# Patient Record
Sex: FEMALE | Race: BLACK OR AFRICAN AMERICAN | Employment: OTHER | URBAN - METROPOLITAN AREA
[De-identification: names, ages, dates, MRNs, and addresses within clinical notes are randomized per-mention and may not be internally consistent; named-entity substitution may affect disease eponyms.]

---

## 2017-10-06 PROBLEM — R06.02 SHORTNESS OF BREATH: Status: ACTIVE | Noted: 2017-10-06

## 2017-10-06 PROBLEM — F41.9 ANXIETY: Status: ACTIVE | Noted: 2017-10-06

## 2017-10-06 PROBLEM — H61.21 IMPACTED CERUMEN OF RIGHT EAR: Status: ACTIVE | Noted: 2017-10-06

## 2017-11-22 PROBLEM — R06.02 SHORTNESS OF BREATH: Status: RESOLVED | Noted: 2017-10-06 | Resolved: 2017-11-22

## 2017-11-22 PROBLEM — H61.21 IMPACTED CERUMEN OF RIGHT EAR: Status: RESOLVED | Noted: 2017-10-06 | Resolved: 2017-11-22

## 2017-12-01 PROBLEM — F99 ABNORMAL MMSE: Status: ACTIVE | Noted: 2017-12-01

## 2018-03-30 ENCOUNTER — HOSPITAL ENCOUNTER (OUTPATIENT)
Dept: MAMMOGRAPHY | Age: 78
Discharge: HOME | End: 2018-03-30
Attending: INTERNAL MEDICINE
Payer: MEDICARE

## 2018-03-30 DIAGNOSIS — Z12.31 VISIT FOR SCREENING MAMMOGRAM: ICD-10-CM

## 2018-03-30 PROCEDURE — 77067 SCR MAMMO BI INCL CAD: CPT

## 2018-07-13 PROBLEM — H91.93 BILATERAL HEARING LOSS: Status: ACTIVE | Noted: 2018-07-13

## 2019-03-15 ENCOUNTER — HOSPITAL ENCOUNTER (OUTPATIENT)
Dept: MAMMOGRAPHY | Age: 79
Discharge: HOME | End: 2019-03-15
Attending: INTERNAL MEDICINE
Payer: MEDICARE

## 2019-03-15 DIAGNOSIS — M81.0 AGE-RELATED OSTEOPOROSIS WITHOUT CURRENT PATHOLOGICAL FRACTURE: ICD-10-CM

## 2019-03-15 PROBLEM — F33.1 MODERATE EPISODE OF RECURRENT MAJOR DEPRESSIVE DISORDER (HCC): Status: ACTIVE | Noted: 2019-03-15

## 2019-03-15 PROBLEM — F22 PARANOID IDEATION (HCC): Status: ACTIVE | Noted: 2019-03-15

## 2019-03-15 PROBLEM — R41.0 CONFUSION: Status: ACTIVE | Noted: 2019-03-15

## 2019-03-15 PROCEDURE — 77080 DXA BONE DENSITY AXIAL: CPT

## 2019-03-29 ENCOUNTER — HOSPITAL ENCOUNTER (OUTPATIENT)
Dept: INFUSION THERAPY | Age: 79
Discharge: HOME | End: 2019-03-29
Payer: MEDICARE

## 2019-03-29 VITALS
OXYGEN SATURATION: 99 % | TEMPERATURE: 98.2 F | WEIGHT: 131 LBS | SYSTOLIC BLOOD PRESSURE: 152 MMHG | DIASTOLIC BLOOD PRESSURE: 65 MMHG | BODY MASS INDEX: 23.96 KG/M2 | HEART RATE: 95 BPM | RESPIRATION RATE: 18 BRPM

## 2019-03-29 PROCEDURE — 96372 THER/PROPH/DIAG INJ SC/IM: CPT

## 2019-03-29 PROCEDURE — 74011250636 HC RX REV CODE- 250/636: Performed by: INTERNAL MEDICINE

## 2019-03-29 RX ADMIN — DENOSUMAB 60 MG: 60 INJECTION SUBCUTANEOUS at 17:17

## 2019-03-29 NOTE — PROGRESS NOTES
Pt arrived ambulatory to [facility]. Prolia given SQ to left arm. Pt has no future appts with [facility] at this time. Pt discharged ambulatory.

## 2019-04-02 ENCOUNTER — APPOINTMENT (OUTPATIENT)
Dept: INFUSION THERAPY | Age: 79
End: 2019-04-02

## 2019-06-03 PROBLEM — G30.1 LATE ONSET ALZHEIMER DISEASE (HCC): Status: ACTIVE | Noted: 2019-06-03

## 2019-06-03 PROBLEM — F02.80 LATE ONSET ALZHEIMER DISEASE (HCC): Status: ACTIVE | Noted: 2019-06-03

## 2019-06-03 PROBLEM — R41.0 CONFUSION: Status: RESOLVED | Noted: 2019-03-15 | Resolved: 2019-06-03

## 2019-06-19 PROBLEM — N28.9 RENAL INSUFFICIENCY, MILD: Status: ACTIVE | Noted: 2019-06-19

## 2019-11-13 PROBLEM — Z82.0 FAMILY HX OF ALS (AMYOTROPHIC LATERAL SCLEROSIS): Status: ACTIVE | Noted: 2019-11-13

## 2019-11-13 PROBLEM — R25.3 FASCICULATIONS OF MUSCLE: Status: ACTIVE | Noted: 2019-11-13

## 2020-07-08 PROBLEM — R25.3 MUSCLE TWITCHING: Status: ACTIVE | Noted: 2020-07-08

## 2020-09-15 PROBLEM — G62.9 AXONAL POLYNEUROPATHY: Status: ACTIVE | Noted: 2020-09-15

## 2020-10-02 PROBLEM — H61.21 IMPACTED CERUMEN OF RIGHT EAR: Status: ACTIVE | Noted: 2020-10-02

## 2021-01-05 PROBLEM — R32 URINARY INCONTINENCE: Status: ACTIVE | Noted: 2021-01-05

## 2021-01-05 PROBLEM — U07.1 COVID-19: Status: ACTIVE | Noted: 2021-01-05

## 2021-01-06 ENCOUNTER — HOSPITAL ENCOUNTER (EMERGENCY)
Age: 81
Discharge: HOME | End: 2021-01-06
Attending: EMERGENCY MEDICINE | Admitting: EMERGENCY MEDICINE
Payer: MEDICARE

## 2021-01-06 ENCOUNTER — APPOINTMENT (OUTPATIENT)
Dept: GENERAL RADIOLOGY | Age: 81
End: 2021-01-06
Attending: EMERGENCY MEDICINE
Payer: MEDICARE

## 2021-01-06 VITALS
TEMPERATURE: 99.2 F | WEIGHT: 130 LBS | OXYGEN SATURATION: 96 % | RESPIRATION RATE: 16 BRPM | BODY MASS INDEX: 23.92 KG/M2 | HEART RATE: 81 BPM | HEIGHT: 62 IN | DIASTOLIC BLOOD PRESSURE: 67 MMHG | SYSTOLIC BLOOD PRESSURE: 140 MMHG

## 2021-01-06 DIAGNOSIS — U07.1 COVID-19: Primary | ICD-10-CM

## 2021-01-06 DIAGNOSIS — R53.1 WEAKNESS: ICD-10-CM

## 2021-01-06 DIAGNOSIS — F03.90 DEMENTIA WITHOUT BEHAVIORAL DISTURBANCE, UNSPECIFIED DEMENTIA TYPE: ICD-10-CM

## 2021-01-06 LAB
ALBUMIN SERPL-MCNC: 2.4 G/DL (ref 3.2–4.6)
ALBUMIN/GLOB SERPL: 0.6 {RATIO} (ref 1.2–3.5)
ALP SERPL-CCNC: 70 U/L (ref 50–136)
ALT SERPL-CCNC: 20 U/L (ref 12–65)
ANION GAP SERPL CALC-SCNC: 7 MMOL/L (ref 7–16)
AST SERPL-CCNC: 36 U/L (ref 15–37)
BASOPHILS # BLD: 0 K/UL (ref 0–0.2)
BASOPHILS NFR BLD: 0 % (ref 0–2)
BILIRUB SERPL-MCNC: 0.5 MG/DL (ref 0.2–1.1)
BUN SERPL-MCNC: 12 MG/DL (ref 8–23)
CALCIUM SERPL-MCNC: 7.9 MG/DL (ref 8.3–10.4)
CHLORIDE SERPL-SCNC: 103 MMOL/L (ref 98–107)
CO2 SERPL-SCNC: 24 MMOL/L (ref 21–32)
CREAT SERPL-MCNC: 0.9 MG/DL (ref 0.6–1)
DIFFERENTIAL METHOD BLD: ABNORMAL
EOSINOPHIL # BLD: 0 K/UL (ref 0–0.8)
EOSINOPHIL NFR BLD: 0 % (ref 0.5–7.8)
ERYTHROCYTE [DISTWIDTH] IN BLOOD BY AUTOMATED COUNT: 13.4 % (ref 11.9–14.6)
GLOBULIN SER CALC-MCNC: 4.1 G/DL (ref 2.3–3.5)
GLUCOSE SERPL-MCNC: 92 MG/DL (ref 65–100)
HCT VFR BLD AUTO: 32.7 % (ref 35.8–46.3)
HGB BLD-MCNC: 10.7 G/DL (ref 11.7–15.4)
IMM GRANULOCYTES # BLD AUTO: 0 K/UL (ref 0–0.5)
IMM GRANULOCYTES NFR BLD AUTO: 0 % (ref 0–5)
LACTATE SERPL-SCNC: 0.8 MMOL/L (ref 0.4–2)
LYMPHOCYTES # BLD: 1.2 K/UL (ref 0.5–4.6)
LYMPHOCYTES NFR BLD: 27 % (ref 13–44)
MCH RBC QN AUTO: 30.5 PG (ref 26.1–32.9)
MCHC RBC AUTO-ENTMCNC: 32.7 G/DL (ref 31.4–35)
MCV RBC AUTO: 93.2 FL (ref 79.6–97.8)
MONOCYTES # BLD: 0.5 K/UL (ref 0.1–1.3)
MONOCYTES NFR BLD: 10 % (ref 4–12)
NEUTS SEG # BLD: 2.9 K/UL (ref 1.7–8.2)
NEUTS SEG NFR BLD: 63 % (ref 43–78)
NRBC # BLD: 0 K/UL (ref 0–0.2)
PLATELET # BLD AUTO: 214 K/UL (ref 150–450)
PMV BLD AUTO: 9.1 FL (ref 9.4–12.3)
POTASSIUM SERPL-SCNC: 3.7 MMOL/L (ref 3.5–5.1)
PROT SERPL-MCNC: 6.5 G/DL (ref 6.3–8.2)
RBC # BLD AUTO: 3.51 M/UL (ref 4.05–5.2)
SODIUM SERPL-SCNC: 134 MMOL/L (ref 136–145)
WBC # BLD AUTO: 4.6 K/UL (ref 4.3–11.1)

## 2021-01-06 PROCEDURE — 71045 X-RAY EXAM CHEST 1 VIEW: CPT

## 2021-01-06 PROCEDURE — 51701 INSERT BLADDER CATHETER: CPT

## 2021-01-06 PROCEDURE — 99285 EMERGENCY DEPT VISIT HI MDM: CPT

## 2021-01-06 PROCEDURE — 96360 HYDRATION IV INFUSION INIT: CPT

## 2021-01-06 PROCEDURE — 83605 ASSAY OF LACTIC ACID: CPT

## 2021-01-06 PROCEDURE — 81003 URINALYSIS AUTO W/O SCOPE: CPT

## 2021-01-06 PROCEDURE — 74011250636 HC RX REV CODE- 250/636: Performed by: EMERGENCY MEDICINE

## 2021-01-06 PROCEDURE — 85025 COMPLETE CBC W/AUTO DIFF WBC: CPT

## 2021-01-06 PROCEDURE — 80053 COMPREHEN METABOLIC PANEL: CPT

## 2021-01-06 RX ADMIN — SODIUM CHLORIDE 500 ML: 900 INJECTION, SOLUTION INTRAVENOUS at 14:30

## 2021-01-06 NOTE — ED TRIAGE NOTES
Patient arrives via [facility] from home with mask in place. Patient covid positive for 1 week. Family called out for worsening confusion and fever. Hx dementia. Daughter reports that patient did not know who she was this am. 100.5 for EMS. Given 500ml NS in route.

## 2021-01-06 NOTE — ED PROVIDER NOTES
History of dementia and hypertension with her daughter. History was obtained from the daughter as the patient is confused. The daughter states the patient was in her usual state of health until around December 30 when she was diagnosed with COVID-19. Since that time, Giselle Hollinsurning has just gone downhill\". The daughter states that she typically is alert and conversant. She has been getting more confused lately. Her daughter has been watching her oxygen level and states it has been fine. Over the last couple days she states her situation has worsened. She did a virtual visit with her primary doctor who wanted the daughter to check her urine. The daughter states that the patient has been unable/unwilling to urinate for her thus she has not checked it. EMS was called today, they found her with a temp of 100.5. The patient has no specific complaints. 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 Gurpreet Johnson surgery     TOTAL ABDOM HYSTERECTOMY      Ooph         Family History:   Problem Relation Age of Onset    Coronary Artery Disease Paternal Grandmother [de-identified]        ?     Other Father         Colorectal Malignancy    Alzheimer Mother     Hypertension Mother     Other Sister         ALS    No Known Problems Brother     No Known Problems Sister     No Known Problems Sister     No Known Problems Brother     Breast Cancer Neg Hx        Social History     Socioeconomic History    Marital status:      Spouse name: Not on file    Number of children: Not on file    Years of education: Not on file    Highest education level: Not on file   Occupational History    Not on file   Social Needs    Financial resource strain: Not on file    Food insecurity     Worry: Not on file     Inability: Not on file  Transportation needs     Medical: Not on file     Non-medical: Not on file   Tobacco Use    Smoking status: Never Smoker    Smokeless tobacco: Never Used   Substance and Sexual Activity    Alcohol use: No    Drug use: No    Sexual activity: Not on file   Lifestyle    Physical activity     Days per week: Not on file     Minutes per session: Not on file    Stress: Not on file   Relationships    Social connections     Talks on phone: Not on file     Gets together: Not on file     Attends Congregation service: Not on file     Active member of club or organization: Not on file     Attends meetings of clubs or organizations: Not on file     Relationship status: Not on file    Intimate partner violence     Fear of current or ex partner: Not on file     Emotionally abused: Not on file     Physically abused: Not on file     Forced sexual activity: Not on file   Other Topics Concern     Service Not Asked    Blood Transfusions Not Asked    Caffeine Concern Not Asked    Occupational Exposure Not Asked   Vallorie Jaylin Hazards Not Asked    Sleep Concern Not Asked    Stress Concern Not Asked    Weight Concern Not Asked    Special Diet Not Asked    Back Care Not Asked    Exercise Yes     Comment: but helps watch her 11 month old great grandchild 2 days a week    Bike Helmet Not Asked   [address] [facility] Not Asked    Self-Exams Not Asked   Social History Narrative    , her daughter and granddaughter live in town. Also her elderly aunt calls her for help         ALLERGIES: Lisinopril    Review of Systems   Unable to perform ROS: Mental status change   Constitutional: Positive for fever. Respiratory: Negative for shortness of breath. Gastrointestinal: Negative for diarrhea and vomiting.        Vitals:    01/06/21 1112   BP: (!) 169/79   Pulse: 86   Resp: 16   Temp: 99.1 °F (37.3 °C)   SpO2: 95%   Weight: 59 kg (130 lb)   Height: 5' 2\" (1.575 m)            Physical Exam  Vitals signs and nursing note reviewed. Constitutional:       Appearance: Normal appearance. She is well-developed and normal weight. HENT:      Head: Normocephalic and atraumatic. Eyes:      Conjunctiva/sclera: Conjunctivae normal.      Pupils: Pupils are equal, round, and reactive to light. Neck:      Musculoskeletal: Normal range of motion and neck supple. Pulmonary:      Effort: Pulmonary effort is normal. No respiratory distress. Abdominal:      General: Bowel sounds are normal. There is no distension. Palpations: Abdomen is soft. Tenderness: There is no abdominal tenderness. Musculoskeletal:         General: No tenderness. Right lower leg: No edema. Left lower leg: No edema. Skin:     General: Skin is warm and dry. Neurological:      Comments: Oriented to person and place only   Psychiatric:         Mood and Affect: Mood normal.         Behavior: Behavior normal.          MDM  Number of Diagnoses or Management Options  COVID-19: new and does not require workup  Dementia without behavioral disturbance, unspecified dementia type ([facility] [place] [address]  Weakness: new and does not require workup  Diagnosis management comments: Radha Perez, daughter ([phone])  2:29 PM discussed results with daughter, UTI or other lab abnormalities. She has not been hypoxic, is stable for discharge at this time.        Amount and/or Complexity of Data Reviewed  Clinical lab tests: ordered and reviewed  Tests in the radiology section of CPT®: ordered and reviewed  Obtain history from someone other than the patient: yes    Risk of Complications, Morbidity, and/or Mortality  Presenting problems: moderate  Diagnostic procedures: moderate  Management options: moderate    Patient Progress  Patient progress: stable         Procedures

## 2021-01-06 NOTE — ED NOTES
I have reviewed discharge instructions with the patient and caregiver. The patient and caregiver verbalized understanding. Patient left ED via Discharge Method: wheelchair to Home with transport from daughter. The patient has been wheeled to car via nurse and appears in no acute distress. The patient has been provided discharge instructions and follow up information. The daughter does not have any question at this time. Opportunity for questions and clarification provided. Patient given 0 scripts. To continue your aftercare when you leave the hospital, you may receive an automated call from our care team to check in on how you are doing. This is a free service and part of our promise to provide the best care and service to meet your aftercare needs.  If you have questions, or wish to unsubscribe from this service please call [phone]. Thank you for Choosing our [facility] Emergency Department.

## 2021-01-06 NOTE — ED NOTES
Ambulatory to and from restroom with patient. Attempted to collect urine specimen but patient had episode of diarrhea. Pulse ox checked upon return and patient did not drop below 94% on room air. Dr. Davion Cuenca aware.

## 2021-09-28 PROBLEM — G30.1 LATE ONSET ALZHEIMER'S DISEASE WITHOUT BEHAVIORAL DISTURBANCE (HCC): Status: ACTIVE | Noted: 2019-05-21

## 2021-09-28 PROBLEM — F02.80 LATE ONSET ALZHEIMER'S DISEASE WITHOUT BEHAVIORAL DISTURBANCE (HCC): Status: ACTIVE | Noted: 2019-05-21

## 2021-10-07 PROBLEM — G30.1 LATE ONSET ALZHEIMER'S DISEASE WITHOUT BEHAVIORAL DISTURBANCE (HCC): Status: RESOLVED | Noted: 2019-05-21 | Resolved: 2021-10-07

## 2021-10-07 PROBLEM — F22 PARANOID IDEATION (HCC): Status: RESOLVED | Noted: 2019-03-15 | Resolved: 2021-10-07

## 2021-10-07 PROBLEM — F02.818 LATE ONSET ALZHEIMER'S DISEASE WITH BEHAVIORAL DISTURBANCE (HCC): Status: ACTIVE | Noted: 2019-06-03

## 2021-10-07 PROBLEM — F02.80 LATE ONSET ALZHEIMER'S DISEASE WITHOUT BEHAVIORAL DISTURBANCE (HCC): Status: RESOLVED | Noted: 2019-05-21 | Resolved: 2021-10-07

## 2021-11-24 ENCOUNTER — HOSPITAL ENCOUNTER (OUTPATIENT)
Dept: MAMMOGRAPHY | Age: 81
Discharge: HOME | End: 2021-11-24
Attending: NURSE PRACTITIONER
Payer: MEDICARE

## 2021-11-24 DIAGNOSIS — Z78.0 POSTMENOPAUSAL STATE: ICD-10-CM

## 2021-11-24 PROCEDURE — 77080 DXA BONE DENSITY AXIAL: CPT

## 2021-11-27 NOTE — PROGRESS NOTES
Bone density worse, has taken Prolia in the past , would like to renew this injection every 6 months. please notify family, and let me know if they ofject

## 2021-12-13 NOTE — PROGRESS NOTES
Pt daughter, Earl Lopez notified of DEXA results and agreed to restart the Prolia injections. Order placed.

## 2021-12-26 ENCOUNTER — HOSPITAL ENCOUNTER (OUTPATIENT)
Age: 81
Setting detail: OBSERVATION
Discharge: HOME | End: 2021-12-28
Attending: EMERGENCY MEDICINE | Admitting: HOSPITALIST
Payer: MEDICARE

## 2021-12-26 ENCOUNTER — APPOINTMENT (OUTPATIENT)
Dept: CT IMAGING | Age: 81
End: 2021-12-26
Attending: EMERGENCY MEDICINE
Payer: MEDICARE

## 2021-12-26 DIAGNOSIS — F02.80 LATE ONSET ALZHEIMER'S DEMENTIA WITHOUT BEHAVIORAL DISTURBANCE (HCC): ICD-10-CM

## 2021-12-26 DIAGNOSIS — G45.9 TIA (TRANSIENT ISCHEMIC ATTACK): Primary | ICD-10-CM

## 2021-12-26 DIAGNOSIS — G62.9 AXONAL POLYNEUROPATHY: ICD-10-CM

## 2021-12-26 DIAGNOSIS — G30.1 LATE ONSET ALZHEIMER'S DEMENTIA WITHOUT BEHAVIORAL DISTURBANCE (HCC): ICD-10-CM

## 2021-12-26 DIAGNOSIS — R41.89 UNRESPONSIVE EPISODE: ICD-10-CM

## 2021-12-26 PROBLEM — I10 HTN (HYPERTENSION): Status: ACTIVE | Noted: 2021-12-26

## 2021-12-26 LAB
ALBUMIN SERPL-MCNC: 3 G/DL (ref 3.2–4.6)
ALBUMIN/GLOB SERPL: 0.8 {RATIO} (ref 1.2–3.5)
ALP SERPL-CCNC: 120 U/L (ref 50–136)
ALT SERPL-CCNC: 17 U/L (ref 12–65)
ANION GAP SERPL CALC-SCNC: 5 MMOL/L (ref 7–16)
AST SERPL-CCNC: 18 U/L (ref 15–37)
BASOPHILS # BLD: 0.1 K/UL (ref 0–0.2)
BASOPHILS NFR BLD: 1 % (ref 0–2)
BILIRUB SERPL-MCNC: 0.5 MG/DL (ref 0.2–1.1)
BUN SERPL-MCNC: 17 MG/DL (ref 8–23)
CALCIUM SERPL-MCNC: 8.8 MG/DL (ref 8.3–10.4)
CHLORIDE SERPL-SCNC: 101 MMOL/L (ref 98–107)
CO2 SERPL-SCNC: 31 MMOL/L (ref 21–32)
CREAT SERPL-MCNC: 1.19 MG/DL (ref 0.6–1)
DIFFERENTIAL METHOD BLD: ABNORMAL
EOSINOPHIL # BLD: 0.2 K/UL (ref 0–0.8)
EOSINOPHIL NFR BLD: 4 % (ref 0.5–7.8)
ERYTHROCYTE [DISTWIDTH] IN BLOOD BY AUTOMATED COUNT: 13.2 % (ref 11.9–14.6)
GLOBULIN SER CALC-MCNC: 4 G/DL (ref 2.3–3.5)
GLUCOSE BLD STRIP.AUTO-MCNC: 118 MG/DL (ref 65–100)
GLUCOSE SERPL-MCNC: 111 MG/DL (ref 65–100)
HCT VFR BLD AUTO: 36.9 % (ref 35.8–46.3)
HGB BLD-MCNC: 11.9 G/DL (ref 11.7–15.4)
IMM GRANULOCYTES # BLD AUTO: 0 K/UL (ref 0–0.5)
IMM GRANULOCYTES NFR BLD AUTO: 1 % (ref 0–5)
INR BLD: 1.1 (ref 0.9–1.2)
INR PPP: 1.1
LYMPHOCYTES # BLD: 1.8 K/UL (ref 0.5–4.6)
LYMPHOCYTES NFR BLD: 43 % (ref 13–44)
MCH RBC QN AUTO: 30.8 PG (ref 26.1–32.9)
MCHC RBC AUTO-ENTMCNC: 32.2 G/DL (ref 31.4–35)
MCV RBC AUTO: 95.6 FL (ref 79.6–97.8)
MONOCYTES # BLD: 0.5 K/UL (ref 0.1–1.3)
MONOCYTES NFR BLD: 12 % (ref 4–12)
NEUTS SEG # BLD: 1.7 K/UL (ref 1.7–8.2)
NEUTS SEG NFR BLD: 40 % (ref 43–78)
NRBC # BLD: 0 K/UL (ref 0–0.2)
PLATELET # BLD AUTO: 231 K/UL (ref 150–450)
PMV BLD AUTO: 8.8 FL (ref 9.4–12.3)
POTASSIUM SERPL-SCNC: 3.9 MMOL/L (ref 3.5–5.1)
PROT SERPL-MCNC: 7 G/DL (ref 6.3–8.2)
PROTHROMBIN TIME: 14.3 SEC (ref 12.6–14.5)
PT BLD: 13.6 SECS (ref 9.6–11.6)
RBC # BLD AUTO: 3.86 M/UL (ref 4.05–5.2)
SERVICE CMNT-IMP: ABNORMAL
SODIUM SERPL-SCNC: 137 MMOL/L (ref 136–145)
TROPONIN-HIGH SENSITIVITY: 11.5 PG/ML (ref 0–14)
WBC # BLD AUTO: 4.3 K/UL (ref 4.3–11.1)

## 2021-12-26 PROCEDURE — 85610 PROTHROMBIN TIME: CPT

## 2021-12-26 PROCEDURE — 93005 ELECTROCARDIOGRAM TRACING: CPT | Performed by: EMERGENCY MEDICINE

## 2021-12-26 PROCEDURE — 99285 EMERGENCY DEPT VISIT HI MDM: CPT

## 2021-12-26 PROCEDURE — 74011000258 HC RX REV CODE- 258: Performed by: EMERGENCY MEDICINE

## 2021-12-26 PROCEDURE — 70450 CT HEAD/BRAIN W/O DYE: CPT

## 2021-12-26 PROCEDURE — 80053 COMPREHEN METABOLIC PANEL: CPT

## 2021-12-26 PROCEDURE — 81003 URINALYSIS AUTO W/O SCOPE: CPT

## 2021-12-26 PROCEDURE — 85025 COMPLETE CBC W/AUTO DIFF WBC: CPT

## 2021-12-26 PROCEDURE — 74011000636 HC RX REV CODE- 636: Performed by: EMERGENCY MEDICINE

## 2021-12-26 PROCEDURE — 82962 GLUCOSE BLOOD TEST: CPT

## 2021-12-26 PROCEDURE — 0042T CT PERF W CBF: CPT

## 2021-12-26 PROCEDURE — 84484 ASSAY OF TROPONIN QUANT: CPT

## 2021-12-26 PROCEDURE — 70496 CT ANGIOGRAPHY HEAD: CPT

## 2021-12-26 RX ORDER — SODIUM CHLORIDE 0.9 % (FLUSH) 0.9 %
10 SYRINGE (ML) INJECTION
Status: COMPLETED | OUTPATIENT
Start: 2021-12-26 | End: 2021-12-26

## 2021-12-26 RX ORDER — HEPARIN SODIUM 5000 [USP'U]/ML
5000 INJECTION, SOLUTION INTRAVENOUS; SUBCUTANEOUS EVERY 8 HOURS
Status: DISCONTINUED | OUTPATIENT
Start: 2021-12-27 | End: 2021-12-28 | Stop reason: HOSPADM

## 2021-12-26 RX ORDER — GUAIFENESIN 100 MG/5ML
81 LIQUID (ML) ORAL DAILY
Status: DISCONTINUED | OUTPATIENT
Start: 2021-12-27 | End: 2021-12-28 | Stop reason: HOSPADM

## 2021-12-26 RX ORDER — SODIUM CHLORIDE 0.9 % (FLUSH) 0.9 %
5-40 SYRINGE (ML) INJECTION EVERY 8 HOURS
Status: DISCONTINUED | OUTPATIENT
Start: 2021-12-26 | End: 2021-12-28 | Stop reason: HOSPADM

## 2021-12-26 RX ORDER — SODIUM CHLORIDE 0.9 % (FLUSH) 0.9 %
5-40 SYRINGE (ML) INJECTION AS NEEDED
Status: DISCONTINUED | OUTPATIENT
Start: 2021-12-26 | End: 2021-12-28 | Stop reason: HOSPADM

## 2021-12-26 RX ADMIN — Medication 10 ML: at 21:57

## 2021-12-26 RX ADMIN — SODIUM CHLORIDE 100 ML: 900 INJECTION, SOLUTION INTRAVENOUS at 21:57

## 2021-12-26 RX ADMIN — IOPAMIDOL 100 ML: 755 INJECTION, SOLUTION INTRAVENOUS at 21:57

## 2021-12-27 ENCOUNTER — APPOINTMENT (OUTPATIENT)
Dept: MRI IMAGING | Age: 81
End: 2021-12-27
Attending: HOSPITALIST
Payer: MEDICARE

## 2021-12-27 PROBLEM — F03.90 DEMENTIA (HCC): Status: ACTIVE | Noted: 2021-12-27

## 2021-12-27 LAB
ATRIAL RATE: 66 BPM
CALCULATED P AXIS, ECG09: 52 DEGREES
CALCULATED R AXIS, ECG10: -11 DEGREES
CALCULATED T AXIS, ECG11: 27 DEGREES
CHOLEST SERPL-MCNC: 238 MG/DL
DIAGNOSIS, 93000: NORMAL
EST. AVERAGE GLUCOSE BLD GHB EST-MCNC: 111 MG/DL
HBA1C MFR BLD: 5.5 % (ref 4.2–6.3)
HDLC SERPL-MCNC: 110 MG/DL (ref 40–60)
HDLC SERPL: 2.2 {RATIO}
LDLC SERPL CALC-MCNC: 119.4 MG/DL
P-R INTERVAL, ECG05: 141 MS
Q-T INTERVAL, ECG07: 419 MS
QRS DURATION, ECG06: 77 MS
QTC CALCULATION (BEZET), ECG08: 439 MS
TRIGL SERPL-MCNC: 43 MG/DL (ref 35–150)
VENTRICULAR RATE, ECG03: 66 BPM
VLDLC SERPL CALC-MCNC: 8.6 MG/DL (ref 6–23)

## 2021-12-27 PROCEDURE — 70551 MRI BRAIN STEM W/O DYE: CPT

## 2021-12-27 PROCEDURE — 83036 HEMOGLOBIN GLYCOSYLATED A1C: CPT

## 2021-12-27 PROCEDURE — 95816 EEG AWAKE AND DROWSY: CPT | Performed by: HOSPITALIST

## 2021-12-27 PROCEDURE — 74011250636 HC RX REV CODE- 250/636: Performed by: HOSPITALIST

## 2021-12-27 PROCEDURE — 80061 LIPID PANEL: CPT

## 2021-12-27 PROCEDURE — 92610 EVALUATE SWALLOWING FUNCTION: CPT

## 2021-12-27 PROCEDURE — G0378 HOSPITAL OBSERVATION PER HR: HCPCS

## 2021-12-27 PROCEDURE — 74011250637 HC RX REV CODE- 250/637: Performed by: INTERNAL MEDICINE

## 2021-12-27 PROCEDURE — 74011250637 HC RX REV CODE- 250/637: Performed by: HOSPITALIST

## 2021-12-27 PROCEDURE — 95816 EEG AWAKE AND DROWSY: CPT | Performed by: PSYCHIATRY & NEUROLOGY

## 2021-12-27 PROCEDURE — 96372 THER/PROPH/DIAG INJ SC/IM: CPT

## 2021-12-27 PROCEDURE — 74011250637 HC RX REV CODE- 250/637: Performed by: STUDENT IN AN ORGANIZED HEALTH CARE EDUCATION/TRAINING PROGRAM

## 2021-12-27 RX ORDER — LOSARTAN POTASSIUM 25 MG/1
25 TABLET ORAL DAILY
Status: DISCONTINUED | OUTPATIENT
Start: 2021-12-27 | End: 2021-12-28 | Stop reason: HOSPADM

## 2021-12-27 RX ORDER — ESCITALOPRAM OXALATE 10 MG/1
10 TABLET ORAL DAILY
Status: DISCONTINUED | OUTPATIENT
Start: 2021-12-27 | End: 2021-12-28 | Stop reason: HOSPADM

## 2021-12-27 RX ORDER — RISPERIDONE 0.5 MG/1
0.25 TABLET, FILM COATED ORAL 2 TIMES DAILY
Status: DISCONTINUED | OUTPATIENT
Start: 2021-12-27 | End: 2021-12-28 | Stop reason: HOSPADM

## 2021-12-27 RX ORDER — CHOLECALCIFEROL (VITAMIN D3) 125 MCG
5 CAPSULE ORAL
Status: DISCONTINUED | OUTPATIENT
Start: 2021-12-27 | End: 2021-12-28 | Stop reason: HOSPADM

## 2021-12-27 RX ORDER — ATORVASTATIN CALCIUM 40 MG/1
80 TABLET, FILM COATED ORAL DAILY
Status: DISCONTINUED | OUTPATIENT
Start: 2021-12-27 | End: 2021-12-28 | Stop reason: HOSPADM

## 2021-12-27 RX ORDER — MEMANTINE HYDROCHLORIDE 5 MG/1
10 TABLET ORAL 2 TIMES DAILY
Status: DISCONTINUED | OUTPATIENT
Start: 2021-12-27 | End: 2021-12-28 | Stop reason: HOSPADM

## 2021-12-27 RX ORDER — DONEPEZIL HYDROCHLORIDE 5 MG/1
10 TABLET, FILM COATED ORAL
Status: DISCONTINUED | OUTPATIENT
Start: 2021-12-27 | End: 2021-12-28 | Stop reason: HOSPADM

## 2021-12-27 RX ADMIN — HEPARIN SODIUM 5000 UNITS: 5000 INJECTION INTRAVENOUS; SUBCUTANEOUS at 21:18

## 2021-12-27 RX ADMIN — Medication 5 ML: at 00:14

## 2021-12-27 RX ADMIN — DONEPEZIL HYDROCHLORIDE 10 MG: 5 TABLET, FILM COATED ORAL at 21:18

## 2021-12-27 RX ADMIN — RISPERIDONE 0.25 MG: 0.5 TABLET ORAL at 11:26

## 2021-12-27 RX ADMIN — Medication 5 ML: at 06:14

## 2021-12-27 RX ADMIN — Medication 5 MG: at 21:18

## 2021-12-27 RX ADMIN — HEPARIN SODIUM 5000 UNITS: 5000 INJECTION INTRAVENOUS; SUBCUTANEOUS at 16:25

## 2021-12-27 RX ADMIN — HEPARIN SODIUM 5000 UNITS: 5000 INJECTION INTRAVENOUS; SUBCUTANEOUS at 06:13

## 2021-12-27 RX ADMIN — MEMANTINE HYDROCHLORIDE 10 MG: 5 TABLET ORAL at 11:01

## 2021-12-27 RX ADMIN — MEMANTINE HYDROCHLORIDE 10 MG: 5 TABLET ORAL at 16:26

## 2021-12-27 RX ADMIN — ATORVASTATIN CALCIUM 80 MG: 40 TABLET, FILM COATED ORAL at 10:57

## 2021-12-27 RX ADMIN — ESCITALOPRAM OXALATE 10 MG: 10 TABLET ORAL at 10:57

## 2021-12-27 RX ADMIN — DONEPEZIL HYDROCHLORIDE 10 MG: 5 TABLET, FILM COATED ORAL at 06:14

## 2021-12-27 RX ADMIN — ASPIRIN 81 MG: 81 TABLET, CHEWABLE ORAL at 10:57

## 2021-12-27 RX ADMIN — RISPERIDONE 0.25 MG: 0.5 TABLET ORAL at 18:03

## 2021-12-27 NOTE — PROCEDURES
Routine EEG Report    Reason for EEG: Unresponsive episode      Current Facility-Administered Medications:     memantine (NAMENDA) tablet 10 mg, 10 mg, Oral, BID, Lauren Prince MD, 10 mg at 12/27/21 1101    [Held by provider] losartan (COZAAR) tablet 25 mg, 25 mg, Oral, DAILY, Prem Smith MD    risperiDONE (RisperDAL) tablet 0.25 mg, 0.25 mg, Oral, BID, Lauren Prince MD, 0.25 mg at 12/27/21 1126    donepeziL (ARICEPT) tablet 10 mg, 10 mg, Oral, QHS, Lauren Prince MD, 10 mg at 12/27/21 3826    escitalopram oxalate (LEXAPRO) tablet 10 mg, 10 mg, Oral, DAILY, Prem Smith MD, 10 mg at 12/27/21 1057    atorvastatin (LIPITOR) tablet 80 mg, 80 mg, Oral, DAILY, Sukumar Morgan MD, 80 mg at 12/27/21 1057    tuberculin injection 5 Units, 5 Units, IntraDERMal, ONCE, Sukumar Morgan MD    sodium chloride (NS) flush 5-40 mL, 5-40 mL, IntraVENous, Q8H, Prem Smith MD, 5 mL at 12/27/21 0614    sodium chloride (NS) flush 5-40 mL, 5-40 mL, IntraVENous, PRN, Prem Smith MD    aspirin chewable tablet 81 mg, 81 mg, Oral, DAILY, Lauren Prince MD, 81 mg at 12/27/21 1057    heparin (porcine) injection 5,000 Units, 5,000 Units, SubCUTAneous, Q8H, Prem Smith MD, 5,000 Units at 12/27/21 9910    Current Outpatient Medications:     donepeziL (ARICEPT) 10 mg tablet, Take 1 tablet by mouth nightly, Disp: 90 Tablet, Rfl: 0    ciprofloxacin HCl (CIPRO) 250 mg tablet, Take 1 Tablet by mouth two (2) times a day., Disp: 14 Tablet, Rfl: 0    escitalopram oxalate (LEXAPRO) 10 mg tablet, Take 1 Tablet by mouth daily. Indications: anxiousness associated with depression, Disp: 90 Tablet, Rfl: 4    losartan (COZAAR) 25 mg tablet, Take 1 Tablet by mouth daily. , Disp: 90 Tablet, Rfl: 4    risperiDONE (RisperDAL) 0.25 mg tablet, Take 1 Tab by mouth two (2) times a day., Disp: 100 Tab, Rfl: 4    memantine (NAMENDA) 10 mg tablet, Take 1 Tab by mouth two (2) times a day.  (Patient taking differently: Take 10 mg by mouth daily.), Disp: 180 Tab, Rfl: 3      Technical Summary: This EEG was performed with a 32-channel digital EEG machine with electrodes placed according to the international 10-20 system of placement. Background:   During the maximal awake state a posterior dominant rhythm of 9 Hz was seen. It was symmetric and reactive to eye opening. Anterior background consisted of medium amplitude activity in the primarily in the alpha range with occasional intermixed frequencies. Hyperventilation: Hyperventilation was not performed due to medical condition    Photic Stimulation: Photic stimulation was not performed due to medical condition    Sleep: None    Impression: Normal awake EEG.

## 2021-12-27 NOTE — ED TRIAGE NOTES
77 y/o F with near syncopal event while at home. Pt with with family noticed that she seemed dizzy.  Pt has a history of getting dizzy  Vitals in the field BP: 112/64 Hr: 60 O2: 100 at room air Oral temp: 98.2

## 2021-12-27 NOTE — PROGRESS NOTES
Care Management Interventions  PCP Verified by CM: Yes  Mode of Transport at Discharge: Other (see comment) (Family)  Transition of Care Consult (CM Consult): Discharge Planning  Discharge Durable Medical Equipment: No  Physical Therapy Consult: Yes  Occupational Therapy Consult: Yes  Speech Therapy Consult: No  Support Systems: Child(aura),Other Family Member(s)  Confirm Follow Up Transport: Family  The Plan for Transition of Care is Related to the Following Treatment Goals : Home with [facility]  The Patient and/or Patient Representative was Provided with a Choice of Provider and Agrees with the Discharge Plan?: Yes  Name of the Patient Representative Who was Provided with a Choice of Provider and Agrees with the Discharge Plan: Patient's daughter  Freedom of Choice List was Provided with Basic Dialogue that Supports the Patient's Individualized Plan of Care/Goals, Treatment Preferences and Shares the Quality Data Associated with the Providers?: Yes  Discharge Location  Discharge Placement: Home with home health      Pt awaiting bed assignment . CM met with pt's daughter to discuss CM needs & DCP. Pt is confused. Pt is partially dependent at home with all ADLS. Pt lives with daughter, son in law and grandchildren. Pt has walker; no further DME needs. Pt has no difficulty with obtaining medications or transport. Patient has never been to rehab, has had [facility] in the past. Family is open to rehab after this admission. 1st choice is [address] PT OT evals pending. PPD ordered. DCP TBD. CM to continue to monitor.

## 2021-12-27 NOTE — ED PROVIDER NOTES
58-year-old female with history of Alzheimer's dementia presents with daughter with complaint of unresponsiveness and leaning to right side that occurred earlier this evening. States that this was noted around 6:30 pm.  Daughter states the patient was unresponsive and leaning to the right staring off into space. States that she has intermittent confusion but that symptoms this evening were much worse than baseline. Denies witnessed seizure-like activity, chest pain, shortness of breath, nausea, vomiting, fever, chills. Denies lateralized weakness, numbness, or coordination problems. States that she is on aspirin. The history is provided by the patient and a relative. The history is limited by the condition of the patient. No  was used. Dizziness  This is a new problem. The current episode started 3 to 5 hours ago. The problem has not changed since onset. There was no focality noted. Primary symptoms include unresponsiveness and disorientation. Pertinent negatives include no focal weakness, no slurred speech, no speech difficulty, no memory loss and no movement disorder. There has been no fever. Associated symptoms include confusion. Pertinent negatives include no shortness of breath, no chest pain, no vomiting, no headaches and no nausea. 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 Rasta Call surgery     AL TOTAL ABDOM HYSTERECTOMY      Ooph         Family History:   Problem Relation Age of Onset    Coronary Art Dis Paternal Grandmother [de-identified]        ?     Other Father         Colorectal Malignancy    Alzheimer's Disease Mother     Hypertension Mother     Other Sister         ALS    No Known Problems Brother     No Known Problems Sister     No Known Problems Sister     No Known Problems Brother     Breast Cancer Neg Hx        Social History     Socioeconomic History    Marital status:      Spouse name: Not on file    Number of children: Not on file    Years of education: Not on file    Highest education level: Not on file   Occupational History    Not on file   Tobacco Use    Smoking status: Never Smoker    Smokeless tobacco: Never Used   Substance and Sexual Activity    Alcohol use: No    Drug use: No    Sexual activity: Not on file   Other Topics Concern     Service Not Asked    Blood Transfusions Not Asked    Caffeine Concern Not Asked    Occupational Exposure Not Asked    Hobby Hazards Not Asked    Sleep Concern Not Asked    Stress Concern Not Asked    Weight Concern Not Asked    Special Diet Not Asked    Back Care Not Asked    Exercise Yes     Comment: but helps watch her 11 month old great grandchild 2 days a week    Bike Helmet Not Asked   [address] Not Asked    Self-Exams Not Asked   Social History Narrative    , her daughter and granddaughter live in town. Also her elderly aunt calls her for help     Social Determinants of Health     Financial Resource Strain:     Difficulty of Paying Living Expenses: Not on file   Food Insecurity:     Worried About Running Out of Food in the Last Year: Not on file    Sabi of Food in the Last Year: Not on file   Transportation Needs:     Lack of Transportation (Medical): Not on file    Lack of Transportation (Non-Medical):  Not on file   Physical Activity:     Days of Exercise per Week: Not on file    Minutes of Exercise per Session: Not on file   Stress:     Feeling of Stress : Not on file   Social Connections:     Frequency of Communication with Friends and Family: Not on file    Frequency of Social Gatherings with Friends and Family: Not on file    Attends Congregation Services: Not on file    Active Member of Clubs or Organizations: Not on file    Attends Club or Organization Meetings: Not on file    Marital Status: Not on file   Intimate Partner Violence:     Fear of Current or Ex-Partner: Not on file    Emotionally Abused: Not on file    Physically Abused: Not on file    Sexually Abused: Not on file   Housing Stability:     Unable to Pay for Housing in the Last Year: Not on file    Number of [place] in the Last Year: Not on file    Unstable Housing in the Last Year: Not on file         ALLERGIES: Lisinopril    Review of Systems   Constitutional: Negative for chills, diaphoresis and fever. HENT: Negative for congestion and rhinorrhea. Eyes: Negative for photophobia and visual disturbance. Respiratory: Negative for cough and shortness of breath. Cardiovascular: Negative for chest pain and palpitations. Gastrointestinal: Negative for abdominal pain, diarrhea, nausea and vomiting. Genitourinary: Negative for dysuria and flank pain. Musculoskeletal: Negative for myalgias, neck pain and neck stiffness. Skin: Negative for rash. Neurological: Positive for dizziness and weakness. Negative for focal weakness, syncope, facial asymmetry, speech difficulty, numbness and headaches. Hematological: Does not bruise/bleed easily. Psychiatric/Behavioral: Positive for confusion. Negative for memory loss. Vitals:    12/26/21 1954   BP: (!) 115/58   Pulse: 67   Resp: 14   Temp: 97.8 °F (36.6 °C)   SpO2: 96%   Height: 5' 2\" (1.575 m)            Physical Exam  Vitals and nursing note reviewed. Constitutional:       Comments: Confused. HENT:      Head: Normocephalic and atraumatic. Nose: Nose normal.      Mouth/Throat:      Mouth: Mucous membranes are moist.   Eyes:      Extraocular Movements: Extraocular movements intact. Conjunctiva/sclera: Conjunctivae normal.      Pupils: Pupils are equal, round, and reactive to light. Comments: No nystagmus. Cardiovascular:      Rate and Rhythm: Normal rate. Pulses: Normal pulses.       Heart sounds: Normal heart sounds. Pulmonary:      Effort: Pulmonary effort is normal.      Breath sounds: Normal breath sounds. Abdominal:      General: Bowel sounds are normal.      Palpations: Abdomen is soft. Tenderness: There is no abdominal tenderness. There is no guarding or rebound. Comments: Soft, NTND. Musculoskeletal:         General: No swelling or deformity. Normal range of motion. Cervical back: Normal range of motion. No rigidity. Skin:     General: Skin is warm. Findings: No erythema or rash. Neurological:      Mental Status: She is alert. Comments: Confused. No facial droop. Moving all extremities equally. No drift. MDM  Number of Diagnoses or Management Options  TIA (transient ischemic attack): new and requires workup  Diagnosis management comments: Code S called on arrival.  At 6:30 PM patient slumped over and had decreased responsiveness. Symptoms have resolved and the patient is back to baseline.'  NIH stroke scale 0. Patient currently asymptomatic, nonfocal exam.  [facility] neurology state tPA not recommended. Recommend admit to monitored unit and further TIA work-up. Will consult hospitalist for admission. Amount and/or Complexity of Data Reviewed  Clinical lab tests: ordered and reviewed  Tests in the radiology section of CPT®: ordered and reviewed  Tests in the medicine section of CPT®: ordered and reviewed  Review and summarize past medical records: yes  Discuss the patient with other providers: yes  Independent visualization of images, tracings, or specimens: yes    Risk of Complications, Morbidity, and/or Mortality  Presenting problems: high  Diagnostic procedures: moderate  Management options: high    Patient Progress  Patient progress: stable    ED Course as of 12/26/21 2109   Sun Dec 26, 2021   2032 CT head FINDINGS:     There is no acute intracranial hemorrhage or CT evidence of acute territorial  infarction.  There is no mass effect, midline shift or hydrocephalus. No  extra-axial fluid collection. Cerebellum and brainstem are grossly unremarkable.     Periventricular hypodensities, nonspecific and likely due to chronic small  vessel changes.     Included globes appear intact. The partially visualized paranasal sinuses and  mastoid air cells are aerated. There is no skull fracture.        IMPRESSION     1. No acute intracranial hemorrhage or CT evidence of acute territorial infarction. Note that MRI is more sensitive for detection of acute/subacute infarct. [DF]      ED Course User Index  [DF] Pam Lima MD       EKG    Date/Time: 12/26/2021 9:10 PM  Performed by: Pam Lima MD  Authorized by: Pam Lima MD     ECG reviewed by ED Physician in the absence of a cardiologist: yes    Rate:     ECG rate:  65    ECG rate assessment: normal    Rhythm:     Rhythm: sinus rhythm    Ectopy:     Ectopy: none    QRS:     QRS axis:  Normal    QRS intervals:  Normal  Conduction:     Conduction: normal    ST segments:     ST segments:  Normal  T waves:     T waves: normal            NIHSS Stroke Scale      Interval: Baseline  Time: 8:29 PM    Person Administering Scale: Travon Wiley MD  Administer stroke scale items in the order listed. Record performance in each category after each subscale exam. Do not go back and change scores. Follow directions provided for each exam technique. Scores should reflect what the patient does, not what the clinician thinks the patient can do. The clinician should record answers while administering the exam and work quickly. Except where indicated, the patient should not be coached (i.e., repeated requests to patient to make a special effort). 1a  Level of consciousness: 0=alert; keenly responsive   1b. LOC questions:  0=Answers both questions correctly   1c. LOC commands: 0=Performs both tasks correctly   2. Best Gaze: 0=normal   3. Visual: 0=No visual loss   4.  Facial Palsy: 0=Normal symmetric movement   5a. Motor left arm: 0=No drift, arm holds 90 (or 45) degrees for full 10 seconds   5b. Motor right arm: 0=No drift, arm holds 90 (or 45) degrees for full 10 seconds   6a. Motor left le=No drift; leg holds 30-degree position for full 5 seconds. 6b  Motor right le=No drift; leg holds 30-degree position for full 5 seconds. 7. Limb Ataxia: 0=Absent   8. Sensory: 0=Normal; no sensory loss   9. Best Language:  0=No aphasia, normal   10. Dysarthria: 0=Normal   11. Extinction and Inattention: 0=No abnormality    Total:   0= No stroke         Results Include:    Recent Results (from the past 24 hour(s))   GLUCOSE, POC    Collection Time: 21  8:08 PM   Result Value Ref Range    Glucose (POC) 118 (H) 65 - 100 mg/dL    Performed by Mervat    CBC WITH AUTOMATED DIFF    Collection Time: 21  8:15 PM   Result Value Ref Range    WBC 4.3 4.3 - 11.1 K/uL    RBC 3.86 (L) 4.05 - 5.2 M/uL    HGB 11.9 11.7 - 15.4 g/dL    HCT 36.9 35.8 - 46.3 %    MCV 95.6 79.6 - 97.8 FL    MCH 30.8 26.1 - 32.9 PG    MCHC 32.2 31.4 - 35.0 g/dL    RDW 13.2 11.9 - 14.6 %    PLATELET 381 240 - 716 K/uL    MPV 8.8 (L) 9.4 - 12.3 FL    ABSOLUTE NRBC 0.00 0.0 - 0.2 K/uL    DF AUTOMATED      NEUTROPHILS 40 (L) 43 - 78 %    LYMPHOCYTES 43 13 - 44 %    MONOCYTES 12 4.0 - 12.0 %    EOSINOPHILS 4 0.5 - 7.8 %    BASOPHILS 1 0.0 - 2.0 %    IMMATURE GRANULOCYTES 1 0.0 - 5.0 %    ABS. NEUTROPHILS 1.7 1.7 - 8.2 K/UL    ABS. LYMPHOCYTES 1.8 0.5 - 4.6 K/UL    ABS. MONOCYTES 0.5 0.1 - 1.3 K/UL    ABS. EOSINOPHILS 0.2 0.0 - 0.8 K/UL    ABS. BASOPHILS 0.1 0.0 - 0.2 K/UL    ABS. IMM.  GRANS. 0.0 0.0 - 0.5 K/UL   PROTHROMBIN TIME + INR    Collection Time: 21  8:15 PM   Result Value Ref Range    Prothrombin time 14.3 12.6 - 14.5 sec    INR 1.1     TROPONIN-HIGH SENSITIVITY    Collection Time: 21  8:15 PM   Result Value Ref Range    Troponin-High Sensitivity 11.5 0 - 14 pg/mL   METABOLIC PANEL, COMPREHENSIVE    Collection Time: 12/26/21  8:15 PM   Result Value Ref Range    Sodium 137 136 - 145 mmol/L    Potassium 3.9 3.5 - 5.1 mmol/L    Chloride 101 98 - 107 mmol/L    CO2 31 21 - 32 mmol/L    Anion gap 5 (L) 7 - 16 mmol/L    Glucose 111 (H) 65 - 100 mg/dL    BUN 17 8 - 23 MG/DL    Creatinine 1.19 (H) 0.6 - 1.0 MG/DL    GFR est AA 56 (L) >60 ml/min/1.73m2    GFR est non-AA 46 (L) >60 ml/min/1.73m2    Calcium 8.8 8.3 - 10.4 MG/DL    Bilirubin, total 0.5 0.2 - 1.1 MG/DL    ALT (SGPT) 17 12 - 65 U/L    AST (SGOT) 18 15 - 37 U/L    Alk. phosphatase 120 50 - 136 U/L    Protein, total 7.0 6.3 - 8.2 g/dL    Albumin 3.0 (L) 3.2 - 4.6 g/dL    Globulin 4.0 (H) 2.3 - 3.5 g/dL    A-G Ratio 0.8 (L) 1.2 - 3.5     POC PT/INR    Collection Time: 12/26/21  8:20 PM   Result Value Ref Range    Prothrombin time (POC) 13.6 (H) 9.6 - 11.6 SECS    INR (POC) 1.1 0.9 - 1.2           Travon Wiley MD; 12/26/2021 @9:12 PM Voice dictation software was used during the making of this note. This software is not perfect and grammatical and other typographical errors may be present.   This note has not been proofread for errors.  ===================================================================

## 2021-12-27 NOTE — ACP (ADVANCE CARE PLANNING)
Advance Care Planning   Healthcare Decision Maker:       Primary Decision Maker: Cristina Rojas - Daughter - [phone]    Secondary Decision Maker: Carline Wilson - Daughter - [phone]    Click here to complete [address] including selection of the Healthcare Decision Maker Relationship (ie \"Primary\")  Today we documented Decision Maker(s) consistent with Legal Next of Kin hierarchy.

## 2021-12-27 NOTE — ED NOTES
Dr. Latia Santillan in Pt room and spoke with Patients daughter about her mothers responsiveness and history of any TIA.  Activating Code S now

## 2021-12-27 NOTE — PROGRESS NOTES
Hospitalist Progress Note   Admit Date:  2021  7:48 PM   Name:  Don Zavala   Age:  78 y.o. Sex:  female  :  1940   MRN:  [id number]   Room:  ER    Presenting Complaint: Dizziness    Reason(s) for Admission: Unresponsive episode [R41.89]  HTN (hypertension) [I10]     Hospital Course & Interval History:   78years old female with history of almost dementia, hypertension currently living at home with daughter was folding laundry during that time patient had a unresponsive episode around 6:30 PM yesterday evening, patient was staring into space, witnessed by patient's son-in-law at bedside. No history of seizure-like activity but patient's speech was slurred when she started speaking. Patient was evaluated in emergency room, CT scan of head was obtained did not show any acute intracranial changes, CTA head and neck did not show any major concerns, no TPA was given by stroke neurologist, ER physician requested observation of this patient for further stroke work-up. Currently patient is around baseline which is oriented x2, able to follow commands without any difficulty. Patient daughter was at bedside giving most of the information. Patient denies any chest pain, shortness of breath, cough, sputum production. Denies any fever, chills, nausea, vomiting, abdominal pain. Subjective (21): Patient is seen and examined at the bedside. Patient is feeling much better today. Patient denies chest pain, shortness of breath, tingling, numbness, weakness, blurry vision.     Assessment & Plan:   Acute encephalopathy  Differentials include complex migraine vs seizures vs seizures CVA  Patient is out of window for TPA  CAT scan of the head was unremarkable   EEG was normal  Follow-up with MRI brain,   Follow-up with echocardiogram,   Started on aspirin and statin  neurology is consulted will appreciate recommendations     Active Problems:    HTN (hypertension) (2021)  Allow permissive hypertension, will restart back on home medication losartan tomorrow      Hyper lipidemia  Cholesterol is 238  Started on statin    Dementia ([facility] [address] (12/27/2021)  AO x2 his baseline  Supportive therapy. Discharge planning to SNF  Diet:  ADULT DIET Regular  DVT PPx: Lovenox  Code status: Full Code    Hospital Problems as of 12/27/2021 Date Reviewed: 9/28/2021          Codes Class Noted - Resolved POA    Dementia ([facility] [address] ICD-10-CM: F03.90  ICD-9-CM: 294.20  12/27/2021 - Present Unknown        HTN (hypertension) ICD-10-CM: I10  ICD-9-CM: 401.9  12/26/2021 - Present Unknown        * (Principal) Unresponsive episode ICD-10-CM: R41.89  ICD-9-CM: 780.09  12/26/2021 - Present Unknown        Essential hypertension ICD-10-CM: I10  ICD-9-CM: 401.9  11/6/2015 - Present Unknown              Objective:     Patient Vitals for the past 24 hrs:   Temp Pulse Resp BP SpO2   12/27/21 1623    (!) 155/84 95 %   12/27/21 1100  66 12 (!) 121/106    12/27/21 0800  75 22 (!) 153/73 97 %   12/27/21 0700  74 27  97 %   12/27/21 0500  70  (!) 158/86 97 %   12/27/21 0400  74  (!) 152/87    12/27/21 0300  68  (!) 156/90    12/27/21 0200  62  (!) 164/84    12/27/21 0013  66 15 (!) 175/79 98 %   12/27/21 0003  66 20  99 %   12/26/21 2330  65 15 (!) 161/94    12/26/21 2243  64 13 (!) 136/114    12/26/21 1954 97.8 °F (36.6 °C) 67 14 (!) 115/58 96 %     Oxygen Therapy  O2 Sat (%): 95 % (12/27/21 1623)  Pulse via Oximetry: 93 beats per minute (12/27/21 1623)  O2 Device: None (Room air) (12/27/21 0800)    Estimated body mass index is 25.61 kg/m² as calculated from the following:    Height as of this encounter: 5' 2\" (1.575 m). Weight as of this encounter: 63.5 kg (140 lb). No intake or output data in the 24 hours ending 12/27/21 1641      Physical Exam:   Blood pressure (!) 155/84, pulse 66, temperature 97.8 °F (36.6 °C), resp. rate 12, height 5' 2\" (1.575 m), weight 63.5 kg (140 lb), SpO2 95 %.   General:    Well nourished. No overt distress  Head:  Normocephalic, atraumatic  Eyes:  Sclerae appear normal.  Pupils equally round. ENT:  Nares appear normal, no drainage. Moist oral mucosa  Neck:  No restricted ROM. Trachea midline   CV:   RRR. No m/r/g. No jugular venous distension. Lungs:   CTAB. No wheezing, rhonchi, or rales. Respirations even, unlabored  Abdomen: Bowel sounds present. Soft, nontender, nondistended. Extremities: No cyanosis or clubbing. No edema  Skin:     No rashes and normal coloration. Warm and dry. Neuro:  CN II-XII grossly intact. Sensation intact. A&Ox3  Psych:  Normal mood and affect.       I have reviewed ordered lab tests and independently visualized imaging below:    Recent Labs:  Recent Results (from the past 48 hour(s))   GLUCOSE, POC    Collection Time: 12/26/21  8:08 PM   Result Value Ref Range    Glucose (POC) 118 (H) 65 - 100 mg/dL    Performed by Mervat    EKG, 12 LEAD, INITIAL    Collection Time: 12/26/21  8:13 PM   Result Value Ref Range    Ventricular Rate 66 BPM    Atrial Rate 66 BPM    P-R Interval 141 ms    QRS Duration 77 ms    Q-T Interval 419 ms    QTC Calculation (Bezet) 439 ms    Calculated P Axis 52 degrees    Calculated R Axis -11 degrees    Calculated T Axis 27 degrees    Diagnosis       Sinus rhythm    Confirmed by ST JONAH WATTERS MD (), KIM SZMYANSKI ([id number]) on 12/27/2021 2:24:44 PM     CBC WITH AUTOMATED DIFF    Collection Time: 12/26/21  8:15 PM   Result Value Ref Range    WBC 4.3 4.3 - 11.1 K/uL    RBC 3.86 (L) 4.05 - 5.2 M/uL    HGB 11.9 11.7 - 15.4 g/dL    HCT 36.9 35.8 - 46.3 %    MCV 95.6 79.6 - 97.8 FL    MCH 30.8 26.1 - 32.9 PG    MCHC 32.2 31.4 - 35.0 g/dL    RDW 13.2 11.9 - 14.6 %    PLATELET 951 864 - 904 K/uL    MPV 8.8 (L) 9.4 - 12.3 FL    ABSOLUTE NRBC 0.00 0.0 - 0.2 K/uL    DF AUTOMATED      NEUTROPHILS 40 (L) 43 - 78 %    LYMPHOCYTES 43 13 - 44 %    MONOCYTES 12 4.0 - 12.0 %    EOSINOPHILS 4 0.5 - 7.8 %    BASOPHILS 1 0.0 - 2.0 % IMMATURE GRANULOCYTES 1 0.0 - 5.0 %    ABS. NEUTROPHILS 1.7 1.7 - 8.2 K/UL    ABS. LYMPHOCYTES 1.8 0.5 - 4.6 K/UL    ABS. MONOCYTES 0.5 0.1 - 1.3 K/UL    ABS. EOSINOPHILS 0.2 0.0 - 0.8 K/UL    ABS. BASOPHILS 0.1 0.0 - 0.2 K/UL    ABS. IMM. GRANS. 0.0 0.0 - 0.5 K/UL   PROTHROMBIN TIME + INR    Collection Time: 12/26/21  8:15 PM   Result Value Ref Range    Prothrombin time 14.3 12.6 - 14.5 sec    INR 1.1     TROPONIN-HIGH SENSITIVITY    Collection Time: 12/26/21  8:15 PM   Result Value Ref Range    Troponin-High Sensitivity 11.5 0 - 14 pg/mL   METABOLIC PANEL, COMPREHENSIVE    Collection Time: 12/26/21  8:15 PM   Result Value Ref Range    Sodium 137 136 - 145 mmol/L    Potassium 3.9 3.5 - 5.1 mmol/L    Chloride 101 98 - 107 mmol/L    CO2 31 21 - 32 mmol/L    Anion gap 5 (L) 7 - 16 mmol/L    Glucose 111 (H) 65 - 100 mg/dL    BUN 17 8 - 23 MG/DL    Creatinine 1.19 (H) 0.6 - 1.0 MG/DL    GFR est AA 56 (L) >60 ml/min/1.73m2    GFR est non-AA 46 (L) >60 ml/min/1.73m2    Calcium 8.8 8.3 - 10.4 MG/DL    Bilirubin, total 0.5 0.2 - 1.1 MG/DL    ALT (SGPT) 17 12 - 65 U/L    AST (SGOT) 18 15 - 37 U/L    Alk.  phosphatase 120 50 - 136 U/L    Protein, total 7.0 6.3 - 8.2 g/dL    Albumin 3.0 (L) 3.2 - 4.6 g/dL    Globulin 4.0 (H) 2.3 - 3.5 g/dL    A-G Ratio 0.8 (L) 1.2 - 3.5     POC PT/INR    Collection Time: 12/26/21  8:20 PM   Result Value Ref Range    Prothrombin time (POC) 13.6 (H) 9.6 - 11.6 SECS    INR (POC) 1.1 0.9 - 1.2     LIPID PANEL    Collection Time: 12/27/21  3:27 AM   Result Value Ref Range    Cholesterol, total 238 (H) <200 MG/DL    Triglyceride 43 35 - 150 MG/DL    HDL Cholesterol 110 (H) 40 - 60 MG/DL    LDL, calculated 119.4 (H) <100 MG/DL    VLDL, calculated 8.6 6.0 - 23.0 MG/DL    CHOL/HDL Ratio 2.2     HEMOGLOBIN A1C WITH EAG    Collection Time: 12/27/21  3:27 AM   Result Value Ref Range    Hemoglobin A1c 5.5 4.20 - 6.30 %    Est. average glucose 111 mg/dL       All Micro Results     None          Other Studies:  CT PERF W CBF    Result Date: 12/26/2021  CT Perfusion Imaging INDICATION:  Acute neuro changes. Dizziness and weakness CT perfusion imaging of the brain was performed after the administration of intravenous contrast.  Perfusion maps and perfusion analysis output were generated using the vis ai perfusion processing software algorithm. Radiation dose reduction techniques were used for this study: All CT scans performed at this facility use one or all of the following: Automated exposure control, adjustment of the mA and/or kVp according to patient's size, iterative reconstruction. FINDINGS: CBF < 30% volume:  0 ml   (core infarction volume greater than 50 cc associated with poor outcomes) Tmax > 6 seconds: 0 ml Tmax/CBF Mismatch Volume: 0 ml Tmax/CBF Mismatch Ratio: N/A Hypoperfusion Intensity Ratio: 0   (values greater than 0.5 associated with poor outcome) Tmax > 10 seconds Volume: 0 ml   (volume greater than 100 mL is associated with poor outcome)     No evidence of core infarct or ischemic penumbra. CTA CODE NEURO HEAD AND NECK W CONT    Result Date: 12/27/2021  Title:  CT arteriogram of the neck and head. Indication: Unresponsive episode. Dementia. Technique: Axial images of the neck and head were obtained after the uneventful administration of intravenous iodinated contrast media. Contrast was used to best identify the arterial structures. Images were reviewed on a separate, free standing, three-dimensional workstation as per the referring physicians request.  All stenosis percentages derived by comparing the narrowest segment with the distal Internal Carotid Artery luminal diameter, as described in the [place] [employer] Symptomatic Carotid Endarterectomy Trial (NASCET) criteria. All CT scans at this facility are performed using dose reduction/dose modulation techniques, as appropriate the performed exam, including the following: Automated Exposure Control;  Adjustment of the mA and/or kV according to patient size (this includes techniques or standardized protocols for targeted exams where dose is matched to indication/reason for exam); and Use of Iterative Reconstruction Technique. Comparison: None. Findings: Lungs:  No focal consolidation or pleural effusions. No suspicious pulmonary nodules. Biapical pleural-parenchymal scarring. Bones:  No osseous destruction. Multilevel degenerative disc disease spanning C4-C6. Paranasal sinuses:  Paranasal sinuses are clear. .  Brain:  No midline shift. No enhancing mass lesion or hydrocephalus. .  Soft tissues:  Subcentimeter nodule in the left thyroid lobe. Dural venous sinuses:  Patent. Aortic arch:  Non-aneurysmal. Arch vessels are patent. .  ANTERIOR CIRCULATION Right common carotid artery:  No significant stenosis or occlusion. Right internal carotid artery:  No significant stenosis or occlusion. Moderate tortuosity. Right middle cerebral artery:  No significant stenosis, occlusion, or aneurysm. Right anterior cerebral artery:  No significant stenosis, occlusion, or aneurysm. Left common carotid artery: No significant stenosis or occlusion. Left internal carotid artery:  No significant stenosis or occlusion. Moderate tortuosity. Left middle cerebral artery:  No significant stenosis, occlusion, or aneurysm. Left anterior cerebral artery:  No significant stenosis, occlusion, or aneurysm. Anterior communicating artery: No significant stenosis, occlusion, or aneurysm. POSTERIOR CIRCULATION Right vertebral artery:  No significant stenosis or occlusion. Left vertebral artery:  No significant stenosis or occlusion. Basilar artery:  No significant stenosis, occlusion, or aneurysm. Right posterior communicating artery: No significant stenosis, occlusion, or aneurysm. Left posterior communicating artery:  No significant stenosis, occlusion, or aneurysm. Right posterior cerebral artery:  No significant stenosis, occlusion, or aneurysm.  Left posterior cerebral artery: No significant stenosis, occlusion, or aneurysm. No acute large vessel occlusion or high-grade stenosis. .     CT CODE NEURO HEAD WO CONTRAST    Result Date: 12/26/2021  Clinical history: Dizziness and weakness TECHNIQUE: Axial coronal CT of the brain without IV contrast. CT dose reduction was achieved through use of a standardized protocol tailored for this examination and automatic exposure control for dose modulation. COMPARISON: July 2014. FINDINGS: There is no acute intracranial hemorrhage or CT evidence of acute territorial infarction. There is no mass effect, midline shift or hydrocephalus. No extra-axial fluid collection. Cerebellum and brainstem are grossly unremarkable. Periventricular hypodensities, nonspecific and likely due to chronic small vessel changes. Included globes appear intact. The partially visualized paranasal sinuses and mastoid air cells are aerated. There is no skull fracture. 1. No acute intracranial hemorrhage or CT evidence of acute territorial infarction. Note that MRI is more sensitive for detection of acute/subacute infarct.       Current Meds:  Current Facility-Administered Medications   Medication Dose Route Frequency    memantine (NAMENDA) tablet 10 mg  10 mg Oral BID    [Held by provider] losartan (COZAAR) tablet 25 mg  25 mg Oral DAILY    risperiDONE (RisperDAL) tablet 0.25 mg  0.25 mg Oral BID    donepeziL (ARICEPT) tablet 10 mg  10 mg Oral QHS    escitalopram oxalate (LEXAPRO) tablet 10 mg  10 mg Oral DAILY    atorvastatin (LIPITOR) tablet 80 mg  80 mg Oral DAILY    tuberculin injection 5 Units  5 Units IntraDERMal ONCE    sodium chloride (NS) flush 5-40 mL  5-40 mL IntraVENous Q8H    sodium chloride (NS) flush 5-40 mL  5-40 mL IntraVENous PRN    aspirin chewable tablet 81 mg  81 mg Oral DAILY    heparin (porcine) injection 5,000 Units  5,000 Units SubCUTAneous Q8H     Current Outpatient Medications   Medication Sig    donepeziL (ARICEPT) 10 mg tablet Take 1 tablet by mouth nightly    ciprofloxacin HCl (CIPRO) 250 mg tablet Take 1 Tablet by mouth two (2) times a day.  escitalopram oxalate (LEXAPRO) 10 mg tablet Take 1 Tablet by mouth daily. Indications: anxiousness associated with depression    losartan (COZAAR) 25 mg tablet Take 1 Tablet by mouth daily.  risperiDONE (RisperDAL) 0.25 mg tablet Take 1 Tab by mouth two (2) times a day.  memantine (NAMENDA) 10 mg tablet Take 1 Tab by mouth two (2) times a day. (Patient taking differently: Take 10 mg by mouth daily.)       Signed:  Joseph Santamaria MD    Part of this note may have been written by using a voice dictation software. The note has been proof read but may still contain some grammatical/other typographical errors.

## 2021-12-27 NOTE — H&P
Hospitalist History and Physical   Admit Date:  2021  7:48 PM   Name:  Chaya Melendez   Age:  78 y.o. Sex:  female  :  1940   MRN:  [id number]     Presenting Complaint: Dizziness    Reason(s) for Admission: Unresponsive episode [R41.89]  HTN (hypertension) [I10]     History of Present Illness:     78years old female with history of almost dementia, hypertension currently living at home with daughter was folding laundry during that time patient had a unresponsive episode around 6:30 PM yesterday evening, patient was staring into space, witnessed by patient's son-in-law at bedside. No history of seizure-like activity but patient's speech was slurred when she started speaking. Patient was evaluated in emergency room, CT scan of head was obtained did not show any acute intracranial changes, CTA head and neck did not show any major concerns, no TPA was given by stroke neurologist, ER physician requested observation of this patient for further stroke work-up. Currently patient is around baseline which is oriented x2, able to follow commands without any difficulty. Patient daughter was at bedside giving most of the information. Patient denies any chest pain, shortness of breath, cough, sputum production. Denies any fever, chills, nausea, vomiting, abdominal pain. Review of Systems:  10 systems reviewed and negative except as noted in HPI. Assessment & Plan:   Principal Problem:    Unresponsive episode (2021)  Rule out CVA, will obtain EEG, MRI brain, echocardiogram, neurology consultation. Active Problems:    HTN (hypertension) (2021)  Allow permissive hypertension, will restart back on home medication when stroke ruled out      Dementia ([facility] [address] (2021)  Supportive therapy. Disposition/Discharge Planning: Likely home with family.     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 Mariam Camacho surgery     MS TOTAL ABDOM HYSTERECTOMY      Ooph      Allergies   Allergen Reactions    Lisinopril Cough      Social History     Tobacco Use    Smoking status: Never Smoker    Smokeless tobacco: Never Used   Substance Use Topics    Alcohol use: No      Family History   Problem Relation Age of Onset    Coronary Art Dis Paternal Grandmother [de-identified]        ?  Other Father         Colorectal Malignancy    Alzheimer's Disease Mother     Hypertension Mother     Other Sister         ALS    No Known Problems Brother     No Known Problems Sister     No Known Problems Sister     No Known Problems Brother     Breast Cancer Neg Hx       Family history reviewed and noncontributory to patient's acute condition; no relevant family history unless otherwise noted above.   Immunization History   Administered Date(s) Administered    COVID-19, Moderna, Primary or Immunocompromised Series, MRNA, PF, 100mcg/0.5mL 03/19/2021, 04/16/2021    Influenza High Dose Vaccine PF 10/09/2017, 10/12/2018, 01/15/2019, 11/06/2019    Influenza Vaccine 01/01/2015    Influenza, Quadrivalent, Adjuvanted (>65 Yrs FLUAD QUAD 34445) 10/02/2020, 09/28/2021    Pneumococcal Conjugate (PCV-13) 11/06/2015, 11/06/2019    Pneumococcal Polysaccharide (PPSV-23) 01/01/2006     PTA Medications:  Current Outpatient Medications   Medication Instructions    ciprofloxacin HCl (CIPRO) 250 mg, Oral, 2 TIMES DAILY    donepeziL (ARICEPT) 10 mg, Oral, EVERY BEDTIME    escitalopram oxalate (LEXAPRO) 10 mg, Oral, DAILY    losartan (COZAAR) 25 mg, Oral, DAILY    memantine (NAMENDA) 10 mg, Oral, 2 TIMES DAILY    risperiDONE (RISPERDAL) 0.25 mg, Oral, 2 TIMES DAILY       Objective:     Patient Vitals for the past 24 hrs:   Temp Pulse Resp BP SpO2   12/27/21 0013  66 15 (!) 175/79 98 %   12/27/21 0003  66 20  99 %   12/26/21 2330  65 15 (!) 161/94    12/26/21 2243  64 13 (!) 136/114    12/26/21 1954 97.8 °F (36.6 °C) 67 14 (!) 115/58 96 %     Oxygen Therapy  O2 Sat (%): 98 % (12/27/21 0013)  Pulse via Oximetry: 67 beats per minute (12/27/21 0013)  O2 Device: None (Room air) (12/26/21 1954)    Estimated body mass index is 25.97 kg/m² as calculated from the following:    Height as of this encounter: 5' 2\" (1.575 m). Weight as of 10/7/21: 64.4 kg (142 lb). No intake or output data in the 24 hours ending 12/27/21 0016      Physical Exam:    General:    Well nourished. No overt distress  Head:  Normocephalic, atraumatic  Eyes:  Sclerae appear normal.  Pupils equally round. HENT:  Nares appear normal, no drainage. Moist mucous membranes  Neck:  No restricted ROM. Trachea midline  CV:   RRR. No m/r/g. No JVD  Lungs:   CTAB. No wheezing, rhonchi, or rales. Appears even, unlabored  Abdomen: Bowel sounds present. Soft, nontender, nondistended. Extremities: Warm and dry. No cyanosis or clubbing. No edema. Skin:     No rashes. Normal turgor. Normal coloration  Neuro:  Cranial nerves II-XII grossly intact. Sensation intact  Psych:  Normal mood and affect.   Alert and oriented x3    Data Ordered and Personally Reviewed:    Last 24hr Labs:  Recent Results (from the past 24 hour(s))   GLUCOSE, POC    Collection Time: 12/26/21  8:08 PM   Result Value Ref Range    Glucose (POC) 118 (H) 65 - 100 mg/dL    Performed by Mervat    CBC WITH AUTOMATED DIFF    Collection Time: 12/26/21  8:15 PM   Result Value Ref Range    WBC 4.3 4.3 - 11.1 K/uL    RBC 3.86 (L) 4.05 - 5.2 M/uL    HGB 11.9 11.7 - 15.4 g/dL    HCT 36.9 35.8 - 46.3 %    MCV 95.6 79.6 - 97.8 FL    MCH 30.8 26.1 - 32.9 PG    MCHC 32.2 31.4 - 35.0 g/dL    RDW 13.2 11.9 - 14.6 %    PLATELET 491 912 - 396 K/uL    MPV 8.8 (L) 9.4 - 12.3 FL    ABSOLUTE NRBC 0.00 0.0 - 0.2 K/uL    DF AUTOMATED NEUTROPHILS 40 (L) 43 - 78 %    LYMPHOCYTES 43 13 - 44 %    MONOCYTES 12 4.0 - 12.0 %    EOSINOPHILS 4 0.5 - 7.8 %    BASOPHILS 1 0.0 - 2.0 %    IMMATURE GRANULOCYTES 1 0.0 - 5.0 %    ABS. NEUTROPHILS 1.7 1.7 - 8.2 K/UL    ABS. LYMPHOCYTES 1.8 0.5 - 4.6 K/UL    ABS. MONOCYTES 0.5 0.1 - 1.3 K/UL    ABS. EOSINOPHILS 0.2 0.0 - 0.8 K/UL    ABS. BASOPHILS 0.1 0.0 - 0.2 K/UL    ABS. IMM. GRANS. 0.0 0.0 - 0.5 K/UL   PROTHROMBIN TIME + INR    Collection Time: 12/26/21  8:15 PM   Result Value Ref Range    Prothrombin time 14.3 12.6 - 14.5 sec    INR 1.1     TROPONIN-HIGH SENSITIVITY    Collection Time: 12/26/21  8:15 PM   Result Value Ref Range    Troponin-High Sensitivity 11.5 0 - 14 pg/mL   METABOLIC PANEL, COMPREHENSIVE    Collection Time: 12/26/21  8:15 PM   Result Value Ref Range    Sodium 137 136 - 145 mmol/L    Potassium 3.9 3.5 - 5.1 mmol/L    Chloride 101 98 - 107 mmol/L    CO2 31 21 - 32 mmol/L    Anion gap 5 (L) 7 - 16 mmol/L    Glucose 111 (H) 65 - 100 mg/dL    BUN 17 8 - 23 MG/DL    Creatinine 1.19 (H) 0.6 - 1.0 MG/DL    GFR est AA 56 (L) >60 ml/min/1.73m2    GFR est non-AA 46 (L) >60 ml/min/1.73m2    Calcium 8.8 8.3 - 10.4 MG/DL    Bilirubin, total 0.5 0.2 - 1.1 MG/DL    ALT (SGPT) 17 12 - 65 U/L    AST (SGOT) 18 15 - 37 U/L    Alk. phosphatase 120 50 - 136 U/L    Protein, total 7.0 6.3 - 8.2 g/dL    Albumin 3.0 (L) 3.2 - 4.6 g/dL    Globulin 4.0 (H) 2.3 - 3.5 g/dL    A-G Ratio 0.8 (L) 1.2 - 3.5     POC PT/INR    Collection Time: 12/26/21  8:20 PM   Result Value Ref Range    Prothrombin time (POC) 13.6 (H) 9.6 - 11.6 SECS    INR (POC) 1.1 0.9 - 1.2         All Micro Results     None          Other Studies:  CT PERF W CBF    Result Date: 12/26/2021  CT Perfusion Imaging INDICATION:  Acute neuro changes.  Dizziness and weakness CT perfusion imaging of the brain was performed after the administration of intravenous contrast.  Perfusion maps and perfusion analysis output were generated using the vis ai perfusion processing software algorithm. Radiation dose reduction techniques were used for this study: All CT scans performed at this facility use one or all of the following: Automated exposure control, adjustment of the mA and/or kVp according to patient's size, iterative reconstruction. FINDINGS: CBF < 30% volume:  0 ml   (core infarction volume greater than 50 cc associated with poor outcomes) Tmax > 6 seconds: 0 ml Tmax/CBF Mismatch Volume: 0 ml Tmax/CBF Mismatch Ratio: N/A Hypoperfusion Intensity Ratio: 0   (values greater than 0.5 associated with poor outcome) Tmax > 10 seconds Volume: 0 ml   (volume greater than 100 mL is associated with poor outcome)     No evidence of core infarct or ischemic penumbra. CT CODE NEURO HEAD WO CONTRAST    Result Date: 12/26/2021  Clinical history: Dizziness and weakness TECHNIQUE: Axial coronal CT of the brain without IV contrast. CT dose reduction was achieved through use of a standardized protocol tailored for this examination and automatic exposure control for dose modulation. COMPARISON: July 2014. FINDINGS: There is no acute intracranial hemorrhage or CT evidence of acute territorial infarction. There is no mass effect, midline shift or hydrocephalus. No extra-axial fluid collection. Cerebellum and brainstem are grossly unremarkable. Periventricular hypodensities, nonspecific and likely due to chronic small vessel changes. Included globes appear intact. The partially visualized paranasal sinuses and mastoid air cells are aerated. There is no skull fracture. 1. No acute intracranial hemorrhage or CT evidence of acute territorial infarction. Note that MRI is more sensitive for detection of acute/subacute infarct.         Medications Administered     iopamidoL (ISOVUE-370) 76 % injection 100 mL     Admin Date  12/26/2021 Action  Given Dose  100 mL Route  IntraVENous Administered By  [facility] E          saline peripheral flush soln 10 mL     Admin Date  12/26/2021 Action  Given Dose  10 mL Route  InterCATHeter Administered By  Angela Gosselin E          sodium chloride (NS) flush 5-40 mL     Admin Date  12/27/2021 Action  Given Dose  5 mL Route  IntraVENous Administered By  Audi Chan RN          sodium chloride 0.9 % bolus infusion 100 mL     Admin Date  12/26/2021 Action  New Bag Dose  100 mL Route  IntraVENous Administered By  oLi Hanson                  Signed:  Tavon Junior MD    Part of this note may have been written by using a voice dictation software. The note has been proof read but may still contain some grammatical/other typographical errors.

## 2021-12-27 NOTE — PROGRESS NOTES
SPEECH LANGUAGE PATHOLOGY: DYSPHAGIA- Initial Assessment    NAME/AGE/GENDER: Mercedes Beal is a 78 y.o. female  DATE: 12/27/2021  PRIMARY DIAGNOSIS: Unresponsive episode [R41.89]  HTN (hypertension) [I10]      ICD-10: Treatment Diagnosis: R13.12 Dysphagia, Oropharyngeal Phase    RECOMMENDATIONS   DIET:    Regular Consistency   Thin Liquids    MEDICATIONS: With liquid     PRECAUTIONS, MODIFICATIONS, AND STRATEGIES  · Slow rate of intake  · Small bites/sips  · Upright at 90 degrees during meal       RECOMMENDATIONS FOR CONTINUED SPEECH THERAPY:   YES: Anticipate need for ongoing speech therapy pending imaging results and clinical presentation. ASSESSMENT   Patient presents with oropharyngeal swallow function that is within normal limits. No s/sx of dysphagia identified. Recommend regular diet/thin liquids. Medications with liquid wash. Dysphagia treatment is not indicated. Patient communicating at conversational level without expressive or receptive language difficulties. Mild confusion which daughter reports is baseline (dementia). CT negative, but MRI pending. Will follow long for possible cognitive-linguistic evaluation pending clinical presentation and MRI results. EDUCATION:  · Recommendations discussed with Patient, Family and RN    REHABILITATION POTENTIAL FOR STATED GOALS: Good    PLAN    FREQUENCY/DURATION:   Assessment of cognitive-linguistic abilities to be completed at next session. Frequency and duration to be determined by findings/recommendations. CONTINUATION OF SKILLED SERVICES/MEDICAL NECESSITY:   Patient continues to require skilled intervention due to possible cognitive-linguistic evaluation. SUBJECTIVE   Patient alert and pleasant. Daughter arrived at bedside during evaluation.      Pain: Pain Scale 1: Numeric (0 - 10)  Pain Intensity 1: 0    History of Present Injury/Illness: Ms. Arun Menezes  has a past medical history of Axonal polyneuropathy (9/15/2020) and Muscle twitching (7/8/2020). . She also  has a past surgical history that includes hx cholecystectomy (10/03); pr total abdom hysterectomy; and hx orthopaedic (Winter 2013). PRECAUTIONS/ALLERGIES: Lisinopril     Problem List:  (Impairments causing functional limitations):  1. Oropharyngeal dysphagia- No symptoms identified  2. Previous Dysphagia: NONE REPORTED  Diet Prior to Evaluation: Regular diet/thin liquids    Orientation:  Person  Disoriented to time, place, and situation    Cognitive-Linguistic Screening:   Alertness  o Alert   Speech Production:   o Fully intelligible   Expressive Language:  o Fluent speech and Able to effectively communicate wants and needs   Receptive Language:  o Answers yes/no questions appropriately and Follows commands   Cognition:   o Some confusion regarding orientation information which daughter reports is typical   Prior Level of Function: Lives with daughter. Alzheimer's at baseline  Recommendations: Given results of screening, patient appears to be functioning at baseline. However imaging results are still pending. Will follow up for further assessment as indicated by findings. OBJECTIVE   Oral Motor:   · Labial: No impairment  · Dentition: Intact  · Oral Hygiene: Adequate  · Lingual: No impairment    Dysphagia evaluation:   Patient presented with thin liquid via cup and straw, puree, mixed, and solid consistencies. Appropriate oral prep with all textures. Timely swallow initiation, and single swallows upon palpation. Adequate oral clearing. No overt signs or symptoms of airway compromise observed with liquid or solid textures.        Tool Used: Dysphagia Outcome and Severity Scale (JULIET)    Score Comments   Normal Diet  [] 7 With no strategies or extra time needed   Functional Swallow  [] 6 May have mild oral or pharyngeal delay   Mild Dysphagia  [] 5 Which may require one diet consistency restricted    Mild-Moderate Dysphagia  [] 4 With 1-2 diet consistencies restricted   Moderate Dysphagia  [] 3 With 2 or more diet consistencies restricted   Moderate-Severe Dysphagia  [] 2 With partial PO strategies (trials with ST only)   Severe Dysphagia  [] 1 With inability to tolerate any PO safely      Score:  Initial: 7 Most Recent: x (Date 12/27/21 )   Interpretation of Tool: The Dysphagia Outcome and Severity Scale (JULIET) is a simple, easy-to-use, 7-point scale developed to systematically rate the functional severity of dysphagia based on objective assessment and make recommendations for diet level, independence level, and type of nutrition. Current Medications:   No current facility-administered medications on file prior to encounter. Current Outpatient Medications on File Prior to Encounter   Medication Sig Dispense Refill    donepeziL (ARICEPT) 10 mg tablet Take 1 tablet by mouth nightly 90 Tablet 0    ciprofloxacin HCl (CIPRO) 250 mg tablet Take 1 Tablet by mouth two (2) times a day. 14 Tablet 0    escitalopram oxalate (LEXAPRO) 10 mg tablet Take 1 Tablet by mouth daily. Indications: anxiousness associated with depression 90 Tablet 4    losartan (COZAAR) 25 mg tablet Take 1 Tablet by mouth daily. 90 Tablet 4    risperiDONE (RisperDAL) 0.25 mg tablet Take 1 Tab by mouth two (2) times a day. 100 Tab 4    memantine (NAMENDA) 10 mg tablet Take 1 Tab by mouth two (2) times a day. (Patient taking differently: Take 10 mg by mouth daily. ) 180 Tab 3        INTERDISCIPLINARY COLLABORATION: RN    After treatment position/precautions:  · Upright in bed  · Daughter at bedside  · RN notified    Total Treatment Duration:   Time In: 8254  Time Out: [address], INST MEDICO DEL [facility], [facility] [place], [facility]  Speech Language Pathologist  Acute Rehabilitation Services  Contact: Alistair

## 2021-12-28 ENCOUNTER — HOSPITAL ENCOUNTER (OUTPATIENT)
Dept: NON INVASIVE DIAGNOSTICS | Age: 81
Setting detail: OBSERVATION
Discharge: HOME | End: 2021-12-28
Attending: HOSPITALIST
Payer: MEDICARE

## 2021-12-28 VITALS
SYSTOLIC BLOOD PRESSURE: 158 MMHG | WEIGHT: 142 LBS | HEIGHT: 62 IN | BODY MASS INDEX: 26.13 KG/M2 | DIASTOLIC BLOOD PRESSURE: 86 MMHG

## 2021-12-28 VITALS
RESPIRATION RATE: 16 BRPM | HEART RATE: 86 BPM | SYSTOLIC BLOOD PRESSURE: 144 MMHG | BODY MASS INDEX: 25.76 KG/M2 | TEMPERATURE: 98.4 F | DIASTOLIC BLOOD PRESSURE: 82 MMHG | WEIGHT: 140 LBS | HEIGHT: 62 IN | OXYGEN SATURATION: 96 %

## 2021-12-28 LAB
ANION GAP SERPL CALC-SCNC: 6 MMOL/L (ref 7–16)
BUN SERPL-MCNC: 14 MG/DL (ref 8–23)
CALCIUM SERPL-MCNC: 9.1 MG/DL (ref 8.3–10.4)
CHLORIDE SERPL-SCNC: 103 MMOL/L (ref 98–107)
CO2 SERPL-SCNC: 29 MMOL/L (ref 21–32)
CREAT SERPL-MCNC: 1.15 MG/DL (ref 0.6–1)
ECHO AO ASC DIAM: 3.2 CM
ECHO AO ASCENDING AORTA INDEX: 1.94 CM/M2
ECHO AO ROOT DIAM: 2.7 CM
ECHO AO ROOT INDEX: 1.64 CM/M2
ECHO AV AREA PEAK VELOCITY: 1.6 CM2
ECHO AV AREA PEAK VELOCITY: 1.6 CM2
ECHO AV AREA VTI: 1.9 CM2
ECHO AV AREA VTI: 1.9 CM2
ECHO AV MEAN GRADIENT: 4 MMHG
ECHO AV MEAN VELOCITY: 0.9 M/S
ECHO AV PEAK GRADIENT: 7 MMHG
ECHO AV PEAK VELOCITY: 1.3 M/S
ECHO AV VELOCITY RATIO: 0.69
ECHO AV VTI: 25.7 CM
ECHO EST RA PRESSURE: 3 MMHG
ECHO LA DIAMETER INDEX: 1.58 CM/M2
ECHO LA DIAMETER: 2.6 CM
ECHO LA TO AORTIC ROOT RATIO: 0.96
ECHO LV E' LATERAL VELOCITY: 8 CM/S
ECHO LV E' SEPTAL VELOCITY: 7 CM/S
ECHO LV EDV A2C: 59 ML
ECHO LV EDV A4C: 56 ML
ECHO LV EDV BP: 58 ML (ref 56–104)
ECHO LV EDV BP: 58 ML (ref 56–104)
ECHO LV EDV INDEX A4C: 34 ML/M2
ECHO LV EDV NDEX A2C: 36 ML/M2
ECHO LV EJECTION FRACTION A2C: 63 %
ECHO LV EJECTION FRACTION A4C: 60 %
ECHO LV EJECTION FRACTION BIPLANE: 62 % (ref 55–100)
ECHO LV EJECTION FRACTION BIPLANE: 62 % (ref 55–100)
ECHO LV ESV A2C: 22 ML
ECHO LV ESV A4C: 22 ML
ECHO LV ESV BP: 22 ML (ref 19–49)
ECHO LV ESV INDEX A2C: 13 ML/M2
ECHO LV ESV INDEX A4C: 13 ML/M2
ECHO LV ESV INDEX BP: 13 ML/M2
ECHO LV FRACTIONAL SHORTENING: 31 % (ref 28–44)
ECHO LV INTERNAL DIMENSION DIASTOLE INDEX: 2.18 CM/M2
ECHO LV INTERNAL DIMENSION DIASTOLIC: 3.6 CM (ref 3.9–5.3)
ECHO LV INTERNAL DIMENSION SYSTOLIC INDEX: 1.52 CM/M2
ECHO LV INTERNAL DIMENSION SYSTOLIC: 2.5 CM
ECHO LV IVSD: 0.9 CM (ref 0.6–0.9)
ECHO LV MASS 2D: 85.6 G (ref 67–162)
ECHO LV MASS INDEX 2D: 51.9 G/M2 (ref 43–95)
ECHO LV POSTERIOR WALL DIASTOLIC: 0.8 CM (ref 0.6–0.9)
ECHO LV RELATIVE WALL THICKNESS RATIO: 0.44
ECHO LVOT AREA: 2.3 CM2
ECHO LVOT AV VTI INDEX: 0.8
ECHO LVOT DIAM: 1.7 CM
ECHO LVOT MEAN GRADIENT: 2 MMHG
ECHO LVOT PEAK GRADIENT: 3 MMHG
ECHO LVOT PEAK VELOCITY: 0.9 M/S
ECHO LVOT STROKE VOLUME INDEX: 28.3 ML/M2
ECHO LVOT SV: 46.7 ML
ECHO LVOT VTI: 20.6 CM
ECHO MV A VELOCITY: 1.12 M/S
ECHO MV AREA PHT: 3.5 CM2
ECHO MV E DECELERATION TIME (DT): 217.9 MS
ECHO MV E VELOCITY: 0.68 M/S
ECHO MV E/A RATIO: 0.61
ECHO MV E/E' LATERAL: 8.5
ECHO MV E/E' RATIO (AVERAGED): 9.11
ECHO MV E/E' SEPTAL: 9.71
ECHO MV PRESSURE HALF TIME (PHT): 63.2 MS
ECHO PULMONARY ARTERY END DIASTOLIC PRESSURE: 4 MMHG
ECHO PV REGURGITANT MAX VELOCITY: 1 M/S
ECHO RIGHT VENTRICULAR SYSTOLIC PRESSURE (RVSP): 35 MMHG
ECHO RV FREE WALL PEAK S': 16 CM/S
ECHO RV INTERNAL DIMENSION: 2.8 CM
ECHO RV TAPSE: 1.7 CM (ref 1.5–2)
ECHO TV REGURGITANT MAX VELOCITY: 2.82 M/S
ECHO TV REGURGITANT PEAK GRADIENT: 32 MMHG
ERYTHROCYTE [DISTWIDTH] IN BLOOD BY AUTOMATED COUNT: 13.1 % (ref 11.9–14.6)
GLUCOSE SERPL-MCNC: 102 MG/DL (ref 65–100)
HCT VFR BLD AUTO: 38 % (ref 35.8–46.3)
HGB BLD-MCNC: 12.3 G/DL (ref 11.7–15.4)
MAGNESIUM SERPL-MCNC: 2.3 MG/DL (ref 1.8–2.4)
MCH RBC QN AUTO: 30.6 PG (ref 26.1–32.9)
MCHC RBC AUTO-ENTMCNC: 32.4 G/DL (ref 31.4–35)
MCV RBC AUTO: 94.5 FL (ref 79.6–97.8)
NRBC # BLD: 0 K/UL (ref 0–0.2)
PHOSPHATE SERPL-MCNC: 3.2 MG/DL (ref 2.3–3.7)
PLATELET # BLD AUTO: 224 K/UL (ref 150–450)
PMV BLD AUTO: 9.3 FL (ref 9.4–12.3)
POTASSIUM SERPL-SCNC: 3.7 MMOL/L (ref 3.5–5.1)
RBC # BLD AUTO: 4.02 M/UL (ref 4.05–5.2)
SODIUM SERPL-SCNC: 138 MMOL/L (ref 136–145)
WBC # BLD AUTO: 4.2 K/UL (ref 4.3–11.1)

## 2021-12-28 PROCEDURE — 97116 GAIT TRAINING THERAPY: CPT

## 2021-12-28 PROCEDURE — 84100 ASSAY OF PHOSPHORUS: CPT

## 2021-12-28 PROCEDURE — 74011250637 HC RX REV CODE- 250/637: Performed by: STUDENT IN AN ORGANIZED HEALTH CARE EDUCATION/TRAINING PROGRAM

## 2021-12-28 PROCEDURE — 99215 OFFICE O/P EST HI 40 MIN: CPT | Performed by: PSYCHIATRY & NEUROLOGY

## 2021-12-28 PROCEDURE — G0378 HOSPITAL OBSERVATION PER HR: HCPCS

## 2021-12-28 PROCEDURE — 85027 COMPLETE CBC AUTOMATED: CPT

## 2021-12-28 PROCEDURE — 96372 THER/PROPH/DIAG INJ SC/IM: CPT

## 2021-12-28 PROCEDURE — 74011250637 HC RX REV CODE- 250/637: Performed by: HOSPITALIST

## 2021-12-28 PROCEDURE — 74011250636 HC RX REV CODE- 250/636: Performed by: HOSPITALIST

## 2021-12-28 PROCEDURE — APPSS45 APP SPLIT SHARED TIME 31-45 MINUTES: Performed by: NURSE PRACTITIONER

## 2021-12-28 PROCEDURE — 97535 SELF CARE MNGMENT TRAINING: CPT

## 2021-12-28 PROCEDURE — 80048 BASIC METABOLIC PNL TOTAL CA: CPT

## 2021-12-28 PROCEDURE — 97161 PT EVAL LOW COMPLEX 20 MIN: CPT

## 2021-12-28 PROCEDURE — 96374 THER/PROPH/DIAG INJ IV PUSH: CPT

## 2021-12-28 PROCEDURE — 97166 OT EVAL MOD COMPLEX 45 MIN: CPT

## 2021-12-28 PROCEDURE — 83735 ASSAY OF MAGNESIUM: CPT

## 2021-12-28 PROCEDURE — 74011250637 HC RX REV CODE- 250/637: Performed by: NURSE PRACTITIONER

## 2021-12-28 RX ORDER — LEVETIRACETAM 250 MG/1
250 TABLET ORAL 2 TIMES DAILY
Status: DISCONTINUED | OUTPATIENT
Start: 2021-12-28 | End: 2021-12-28 | Stop reason: HOSPADM

## 2021-12-28 RX ORDER — CHOLECALCIFEROL (VITAMIN D3) 125 MCG
5 CAPSULE ORAL
Qty: 30 TABLET | Refills: 0 | Status: SHIPPED | OUTPATIENT
Start: 2021-12-28 | End: 2022-01-27

## 2021-12-28 RX ORDER — LOSARTAN POTASSIUM 50 MG/1
25 TABLET ORAL ONCE
Status: COMPLETED | OUTPATIENT
Start: 2021-12-28 | End: 2021-12-28

## 2021-12-28 RX ORDER — LEVETIRACETAM 250 MG/1
250 TABLET ORAL 2 TIMES DAILY
Qty: 60 TABLET | Refills: 0 | Status: SHIPPED | OUTPATIENT
Start: 2021-12-28 | End: 2022-01-26 | Stop reason: SDUPTHER

## 2021-12-28 RX ORDER — POTASSIUM CHLORIDE 20 MEQ/1
20 TABLET, EXTENDED RELEASE ORAL
Status: COMPLETED | OUTPATIENT
Start: 2021-12-28 | End: 2021-12-28

## 2021-12-28 RX ADMIN — ASPIRIN 81 MG: 81 TABLET, CHEWABLE ORAL at 11:22

## 2021-12-28 RX ADMIN — LOSARTAN POTASSIUM 25 MG: 50 TABLET, FILM COATED ORAL at 11:22

## 2021-12-28 RX ADMIN — LEVETIRACETAM 250 MG: 250 TABLET, FILM COATED ORAL at 11:22

## 2021-12-28 RX ADMIN — POTASSIUM CHLORIDE 20 MEQ: 20 TABLET, EXTENDED RELEASE ORAL at 11:22

## 2021-12-28 RX ADMIN — ESCITALOPRAM OXALATE 10 MG: 10 TABLET ORAL at 11:22

## 2021-12-28 RX ADMIN — Medication 5 ML: at 06:50

## 2021-12-28 RX ADMIN — ATORVASTATIN CALCIUM 80 MG: 40 TABLET, FILM COATED ORAL at 11:22

## 2021-12-28 RX ADMIN — HEPARIN SODIUM 5000 UNITS: 5000 INJECTION INTRAVENOUS; SUBCUTANEOUS at 06:50

## 2021-12-28 NOTE — PROGRESS NOTES
CM met with patient daughter ([place]) at bedside to discuss discharge plan and needs. Daughter states that patient lives back and forth from home to her house. States that patient attends Adult Day [facility] ([facility] in [facility] x5 day a week 8 hrs a day. Daughter states that patient has been denied for Medicaid. States that patient has an income of $1700 a month. Daughter was provided resources and states she has tried the resources that were provided and they had no discounts and couldn't met the patient needs. Patient is currently observation and CM explained that to daughter. Daughter is requesting that patient has LTC at a facility. CM inquired about private pay and daughter states that they can't afford private pay. CM informed daughter that PT/OT had no further recommendation for patient. CM informed daughter that patient do has discharge orders to return home today. Daughter remain at bedside and will transport patient home. CM will continue to monitor. Care Management Interventions  PCP Verified by CM: Yes  Mode of Transport at Discharge:  Other (see comment) (Family)  Transition of Care Consult (CM Consult): Discharge Planning  Discharge Durable Medical Equipment: No  Physical Therapy Consult: Yes  Occupational Therapy Consult: Yes  Speech Therapy Consult: No  Support Systems: Child(aura),Other Family Member(s)  Confirm Follow Up Transport: Family  The Plan for Transition of Care is Related to the Following Treatment Goals : Home with [facility]  The Patient and/or Patient Representative was Provided with a Choice of Provider and Agrees with the Discharge Plan?: Yes  Name of the Patient Representative Who was Provided with a Choice of Provider and Agrees with the Discharge Plan: Patient's daughter  Freedom of Choice List was Provided with Basic Dialogue that Supports the Patient's Individualized Plan of Care/Goals, Treatment Preferences and Shares the Quality Data Associated with the Providers?: Yes  Discharge Location  Discharge Placement: Home with family assistance

## 2021-12-28 NOTE — ED NOTES
Pt resting quietly at this time in bed. Confusion noted but per night shift RN is normal for her at this time. Call light in reach and will continue to monitor.

## 2021-12-28 NOTE — PROGRESS NOTES
12/28/21 7623   Dual Skin Pressure Injury Assessment   Dual Skin Pressure Injury Assessment WDL   Second Care Provider (Based on [address] [place] [address]) Arabella Hameed RN   Pt had no noted pressure injuries at sacrum and heels. Skin is intact.

## 2021-12-28 NOTE — CONSULTS
Consult    Patient: Pretty Montaño MRN: [id number]     YOB: 1940  Age: 78 y.o. Sex: female      Subjective:      Pretty Montaño is a 78 y.o. female who is being seen for an unresponsive episode. The patient has a history of dementia. She cannot provide history. According to the patient's daughter, she was folding laundry and had an episode of unresponsiveness. She was apparently staring off into space. The duration of this event is not known. He has had extensive work-up in the emergency department including CT head, CTA head and neck, MRI of the brain, and EEG. No acute abnormalities. The patient cannot provide an accurate history. She does not recall the events that occurred    Past Medical History:   Diagnosis Date    Axonal polyneuropathy 9/15/2020    Muscle twitching 7/8/2020     Past Surgical History:   Procedure Laterality Date    HX CHOLECYSTECTOMY  10/03    HX ORTHOPAEDIC  Winter 2013    foot surgery-right: Dr. Chandler Figueroa surgery     MD TOTAL ABDOM HYSTERECTOMY      Ooph      Family History   Problem Relation Age of Onset    Coronary Art Dis Paternal Grandmother [de-identified]        ?     Other Father         Colorectal Malignancy    Alzheimer's Disease Mother     Hypertension Mother     Other Sister         ALS    No Known Problems Brother     No Known Problems Sister     No Known Problems Sister     No Known Problems Brother     Breast Cancer Neg Hx      Social History     Tobacco Use    Smoking status: Never Smoker    Smokeless tobacco: Never Used   Substance Use Topics    Alcohol use: No      Current Facility-Administered Medications   Medication Dose Route Frequency Provider Last Rate Last Admin    losartan (COZAAR) tablet 25 mg  25 mg Oral Fabby Rene MD        potassium chloride (K-DUR, KLOR-CON M20) SR tablet 20 mEq  20 mEq Oral NOW Rhoda Cochran MD        levETIRAcetam (KEPPRA) tablet 250 mg  250 mg Oral BID Kathy Mills APRN  memantine (NAMENDA) tablet 10 mg  10 mg Oral BID Agapito Garcia MD   10 mg at 12/27/21 1626    losartan (COZAAR) tablet 25 mg  25 mg Oral DAILY Prem Smith MD        risperiDONE (RisperDAL) tablet 0.25 mg  0.25 mg Oral BID Agapito Garcia MD   0.25 mg at 12/27/21 1803    donepeziL (ARICEPT) tablet 10 mg  10 mg Oral QHS Agapito Garcia MD   10 mg at 12/27/21 2118    escitalopram oxalate (LEXAPRO) tablet 10 mg  10 mg Oral DAILY Agapito Garcia MD   10 mg at 12/27/21 1057    atorvastatin (LIPITOR) tablet 80 mg  80 mg Oral DAILY Blas Mcburney, MD   80 mg at 12/27/21 1057    tuberculin injection 5 Units  5 Units IntraDERMal Mariusz Malave MD        melatonin tablet 5 mg  5 mg Oral QHS Mattie Do DO   5 mg at 12/27/21 2118    sodium chloride (NS) flush 5-40 mL  5-40 mL IntraVENous Q8H Agapito Garcia MD   5 mL at 12/28/21 0650    sodium chloride (NS) flush 5-40 mL  5-40 mL IntraVENous PRN Agapito Garcia MD        aspirin chewable tablet 81 mg  81 mg Oral DAILY Agapito Garcia MD   81 mg at 12/27/21 1057    heparin (porcine) injection 5,000 Units  5,000 Units SubCUTAneous Q8H Agapito Garcia MD   5,000 Units at 12/28/21 5053     Current Outpatient Medications   Medication Sig Dispense Refill    donepeziL (ARICEPT) 10 mg tablet Take 1 tablet by mouth nightly 90 Tablet 0    ciprofloxacin HCl (CIPRO) 250 mg tablet Take 1 Tablet by mouth two (2) times a day. 14 Tablet 0    escitalopram oxalate (LEXAPRO) 10 mg tablet Take 1 Tablet by mouth daily. Indications: anxiousness associated with depression 90 Tablet 4    losartan (COZAAR) 25 mg tablet Take 1 Tablet by mouth daily. 90 Tablet 4    risperiDONE (RisperDAL) 0.25 mg tablet Take 1 Tab by mouth two (2) times a day. 100 Tab 4    memantine (NAMENDA) 10 mg tablet Take 1 Tab by mouth two (2) times a day. (Patient taking differently: Take 10 mg by mouth daily. ) 180 Tab 3        Allergies   Allergen Reactions    Lisinopril Cough       Review of Systems:  Could not obtain due to dementia. Objective:     Vitals:    12/27/21 2121 12/28/21 0417 12/28/21 0419 12/28/21 0741   BP: (!) 160/84 (!) 154/100 (!) 163/94 (!) 148/98   Pulse: 88   86   Resp: 18   16   Temp:    98.4 °F (36.9 °C)   SpO2: 98% 98%  98%   Weight:       Height:            Physical Exam:  General - Well developed, well nourished, in no apparent distress. Pleasant and conversant. Wandering around the room  HEENT - Normocephalic, atraumatic. Conjunctiva, tympanic membranes, and oropharynx are clear. Neck - Supple without masses, no bruits   Cardiovascular - Regular rate and rhythm. Normal S1, S2 without murmurs, rubs, or gallops. Lungs - Clear to auscultation. Abdomen - Soft, nontender with normal bowel sounds. Extremities - Peripheral pulses intact. No edema and no rashes. Neurological examination -  Oriented to person only. Comprehension is poor. Attention is poor. Language and speech are limited but no obvious aphasia. On cranial nerve examination pupils are equal round and reactive to light. Could not participate in funduscopic examination, visual acuity, or visual field testing. Extraocular motility is normal. Face is symmetric and sensation is intact to light touch. Hearing is intact to finger rustle bilaterally. Motor examination - There is normal muscle tone and bulk. Power is full throughout. Muscle stretch reflexes are normoactive and there are no pathological reflexes present. Cannot participate in sensory examination.   Gait and stance is normal.        Lab Results   Component Value Date/Time    Cholesterol, total 238 (H) 12/27/2021 03:27 AM    HDL Cholesterol 110 (H) 12/27/2021 03:27 AM    LDL, calculated 119.4 (H) 12/27/2021 03:27 AM    VLDL, calculated 8.6 12/27/2021 03:27 AM    Triglyceride 43 12/27/2021 03:27 AM    CHOL/HDL Ratio 2.2 12/27/2021 03:27 AM        Lab Results   Component Value Date/Time    Hemoglobin A1c 5.5 12/27/2021 03:27 AM        CTA Results (most recent): Personally Reviewed   Results from Hospital Encounter encounter on 12/26/21    CTA CODE NEURO HEAD AND NECK W CONT    Narrative  Title:  CT arteriogram of the neck and head. Indication: Unresponsive episode. Dementia. Technique: Axial images of the neck and head were obtained after the uneventful  administration of intravenous iodinated contrast media. Contrast was used to  best identify the arterial structures. Images were reviewed on a separate, free  standing, three-dimensional workstation as per the referring physicians request.      All stenosis percentages derived by comparing the narrowest segment with the  distal Internal Carotid Artery luminal diameter, as described in the [place]  American Symptomatic Carotid Endarterectomy Trial (NASCET) criteria. All CT scans at this facility are performed using dose reduction/dose modulation  techniques, as appropriate the performed exam, including the following:  Automated Exposure Control; Adjustment of the mA and/or kV according to patient  size (this includes techniques or standardized protocols for targeted exams  where dose is matched to indication/reason for exam); and Use of Iterative  Reconstruction Technique. Comparison: None. Findings:    Lungs:  No focal consolidation or pleural effusions. No suspicious pulmonary  nodules. Biapical pleural-parenchymal scarring. Bones:  No osseous destruction. Multilevel degenerative disc disease spanning  C4-C6. Paranasal sinuses:  Paranasal sinuses are clear. .  Brain:  No midline shift. No enhancing mass lesion or hydrocephalus. .  Soft tissues:  Subcentimeter nodule in the left thyroid lobe. Dural venous sinuses:  Patent. Aortic arch:  Non-aneurysmal. Arch vessels are patent. .    ANTERIOR CIRCULATION    Right common carotid artery:  No significant stenosis or occlusion. Right internal carotid artery:  No significant stenosis or occlusion. Moderate  tortuosity.     Right middle cerebral artery:  No significant stenosis, occlusion, or aneurysm. Right anterior cerebral artery:  No significant stenosis, occlusion, or  aneurysm. Left common carotid artery: No significant stenosis or occlusion. Left internal carotid artery:  No significant stenosis or occlusion. Moderate  tortuosity. Left middle cerebral artery:  No significant stenosis, occlusion, or aneurysm. Left anterior cerebral artery:  No significant stenosis, occlusion, or aneurysm. Anterior communicating artery: No significant stenosis, occlusion, or aneurysm. POSTERIOR CIRCULATION    Right vertebral artery:  No significant stenosis or occlusion. Left vertebral artery:  No significant stenosis or occlusion. Basilar artery:  No significant stenosis, occlusion, or aneurysm. Right posterior communicating artery: No significant stenosis, occlusion, or  aneurysm. Left posterior communicating artery:  No significant stenosis, occlusion, or  aneurysm. Right posterior cerebral artery:  No significant stenosis, occlusion, or  aneurysm. Left posterior cerebral artery:  No significant stenosis, occlusion, or  aneurysm. Impression  No acute large vessel occlusion or high-grade stenosis. Onel Cutler       Results for orders placed or performed during the hospital encounter of 12/26/21   EKG, 12 LEAD, INITIAL   Result Value Ref Range    Ventricular Rate 66 BPM    Atrial Rate 66 BPM    P-R Interval 141 ms    QRS Duration 77 ms    Q-T Interval 419 ms    QTC Calculation (Bezet) 439 ms    Calculated P Axis 52 degrees    Calculated R Axis -11 degrees    Calculated T Axis 27 degrees    Diagnosis       Sinus rhythm    Confirmed by ST JONAH WATTERS MD (), KIM SZYMANSKI ([id number]) on 12/27/2021 2:24:44 PM     Results for orders placed or performed in visit on 05/19/17   AMB POC EKG ROUTINE W/ 12 LEADS, INTER & REP    Impression    Sinus, rate 89, normal        MRI Results (most recent): Personally Reviewed  Results from Hospital Encounter encounter on 12/26/21    MRI BRAIN WO CONT    Narrative  BRAIN MRI:    CLINICAL HISTORY:  Dizziness after an episode of unresponsiveness. TECHNIQUE:  Sagittal T1 weighted, coronal FLAIR, and axial T1 and T2-weighted  spin echo, FLAIR, gradient echo, and diffusion-weighted images were obtained. COMPARISON:  CT perfusion and CTA yesterday. FINDINGS:  No intracranial mass effect, hemorrhage, or evidence of  acute/subacute ischemia is seen. Scattered punctate T2 and FLAIR  hyperintensities scattered in the white matter are nonspecific but most  consistent with small vessel ischemic disease. The ventricles are normal in  size and configuration accounting for the patient's age. Orbits and paranasal  sinuses as well as mastoid air cells are clear as imaged. Impression  MILD SMALL VESSEL ISCHEMIC DISEASE WITH NO ACUTE INTRACRANIAL  ABNORMALITY IDENTIFIED. Assessment:     75-year-old woman with a history of dementia, perhaps Alzheimer disease dementia, likely moderate to severe in degree. Patient had a staring off episode. It is not uncommon for patients with advanced Alzheimer disease to have focal seizures. Her EEG is reassuring, but the sensitivity of EEG is low. We will start her on levetiracetam 250 mg twice daily. No further recommendations.   Neurology will sign off    Plan:     As above    Signed By: Rudy Swann DO     December 28, 2021

## 2021-12-28 NOTE — PROGRESS NOTES
ACUTE OT GOALS:  (Developed with and agreed upon by patient and/or caregiver.)  1. Patient will complete grooming independently. GOAL MET   2. Patient will complete upper body dressing independently. GOAL MET   3. Patient will tolerate 20 minutes of OT treatment with no rest breaks to increase activity tolerance for ADLs. GOAL MET   4. Patient will complete toileting and toilet transfer independently. GOAL MET     TIMEFRAME: 7 visits     OCCUPATIONAL THERAPY ASSESSMENT: Initial Assessment, Daily Note and Discharge OT Treatment Day # 1    Hebert Young is a 78 y.o. female   PRIMARY DIAGNOSIS: Unresponsive episode  Unresponsive episode [R41.89]  HTN (hypertension) [I10]       Reason for Referral:  Syncopal like episode   ICD-10: Treatment Diagnosis: Dizziness and Giddiness (R42)  OBSERVATION[place] / [place] / Product Type: Medicare /   ASSESSMENT:     REHAB RECOMMENDATIONS:   Recommendation to date pending progress:  Setting:   No further skilled therapy   Equipment:    To Be Determined     PRIOR LEVEL OF FUNCTION:  (Prior to Hospitalization)  INITIAL/CURRENT LEVEL OF FUNCTION:  (Based on today's evaluation)   Bathing:   Supervision  Dressing:   Supervision  Feeding/Grooming:   Independent  Toileting:   Independent  Functional Mobility:   Independent Bathing:   Supervision  Dressing:   Supervision  Feeding/Grooming:   Independent  Toileting:   Independent  Functional Mobility:   Supervision     ASSESSMENT:  Ms. Marley Mccarty is a pleasant 79 y/o F with relevant PMH of dementia admitted for work up after episode of non-responsiveness. Per patient, she lives with her daughter & is independent with ADLs. Today, pleasantly confused & able to follow all commands. Demonstrates independence with toileting, grooming, and dressing ADLs today. Patient is likely close to her baseline. Do not anticipate any OT needs at this time.       SUBJECTIVE:   Ms. Marley Mccarty states, [facility] you ever been to [place]? .\"    SOCIAL HISTORY/LIVING ENVIRONMENT: Lives with daughter. Independent with ADLs. Daughter assists with IADLs, per patient. Has a walker but was not needing to use it. Support Systems: Child(aura),Other Family Member(s)    OBJECTIVE:     PAIN: VITAL SIGNS: LINES/DRAINS:   Pre Treatment: Pain Screen  Pain Scale 1: Numeric (0 - 10)  Pain Intensity 1: 0  Post Treatment: 0   IV  O2 Device: None (Room air)     GROSS EVALUATION:  BUE Within Functional Limits Abnormal/ Functional Abnormal/ Non-Functional (see comments) Not Tested Comments:   AROM [x] [] [] []    PROM [] [] [] [x]    Strength [x] [] [] []    Balance [x] [] [] []    Posture [x] [] [] []    Sensation [] [] [] [x]    Coordination [] [] [] [x]    Tone [] [] [] [x]    Edema [] [] [] [x]    Activity Tolerance [x] [] [] []     [] [] [] []      COGNITION/  PERCEPTION: Intact Impaired   (see comments) Comments:   Orientation [] [x] Pleasantly confused, dementia at baseline.  Disoriented to place, situation    Vision [x] []    Hearing [] [x] [place]    Judgment/ Insight [] [x]    Attention [] [x]    Memory [] [x] Baseline dementia    Command Following [x] []    Emotional Regulation [x] [] Pleasant & cooperative     [] []      ACTIVITIES OF DAILY LIVING: I Mod I S SBA CGA Min Mod Max Total NT Comments   BASIC ADLs:              Bathing/ Showering [] [] [] [] [] [] [] [] [] []    Toileting [x] [] [] [] [] [] [] [] [] []    Dressing [x] [] [] [] [] [] [] [] [] []    Feeding [] [] [] [] [] [] [] [] [] []    Grooming [x] [] [] [] [] [] [] [] [] []    Personal Device Care [] [] [] [] [] [] [] [] [] []    Functional Mobility [] [] [x] [] [] [] [] [] [] []    I=Independent, Mod I=Modified Independent, S=Supervision, SBA=Standby Assistance, CGA=Contact Guard Assistance,   Min=Minimal Assistance, Mod=Moderate Assistance, Max=Maximal Assistance, Total=Total Assistance, NT=Not Tested    MOBILITY: I Mod I S SBA CGA Min Mod Max Total  NT x2 Comments: Supine to sit [] [] [] [] [] [] [] [] [] [] []    Sit to supine [] [x] [] [] [] [] [] [] [] [] []    Sit to stand [x] [] [] [] [] [] [] [] [] [] []    Bed to chair [] [] [x] [] [] [] [] [] [] [] []    I=Independent, Mod I=Modified Independent, S=Supervision, SBA=Standby Assistance, CGA=Contact Guard Assistance,   Min=Minimal Assistance, Mod=Moderate Assistance, Max=Maximal Assistance, Total=Total Assistance, NT=Not Tested    [address] [place] [address] [facility] Joel Jaeger   Daily Activity Inpatient Short Form        How much help from another person does the patient currently need. .. Total A Lot A Little None   1. Putting on and taking off regular lower body clothing? [] 1   [] 2   [] 3   [x] 4   2. Bathing (including washing, rinsing, drying)? [] 1   [] 2   [] 3   [x] 4   3. Toileting, which includes using toilet, bedpan or urinal?   [] 1   [] 2   [] 3   [x] 4   4. Putting on and taking off regular upper body clothing? [] 1   [] 2   [] 3   [x] 4   5. Taking care of personal grooming such as brushing teeth? [] 1   [] 2   [] 3   [x] 4   6. Eating meals? [] 1   [] 2   [] 3   [x] 4   © 2007, Trustees of [address] [place] [address], under license to [email or web address]. All rights reserved     Score:  Initial: 24 Most Recent: X (Date: -- )   Interpretation of Tool:  Represents activities that are increasingly more difficult (i.e. Bed mobility, Transfers, Gait). PLAN:   FREQUENCY/DURATION: OT Plan of Care:  Discharge OT. PROBLEM LIST:   (Skilled intervention is medically necessary to address:)  1. Decreased Cognition   INTERVENTIONS PLANNED:   (Benefits and precautions of occupational therapy have been discussed with the patient.)  1. Self Care Training  2. Therapeutic Activity     TREATMENT:     EVALUATION: Moderate Complexity : (Untimed Charge)    TREATMENT:   (     )  Self Care (8 Minutes): Self care including Toileting, Upper Body Dressing and Grooming to increase independence.     TREATMENT GRID:  N/A    AFTER TREATMENT POSITION/PRECAUTIONS:  Bed, Needs within reach and RN notified    INTERDISCIPLINARY COLLABORATION:  RN/PCT, PT/PTA and OT/CABRERA    TOTAL TREATMENT DURATION:  OT Patient Time In/Time Out  Time In: 1050  Time Out: 325 ELINA James/L

## 2021-12-28 NOTE — ED NOTES
Pt with increasing confusion, hospitalist notified given order for melatonin. Philip Medeiros RN notified that patient is flight risk and needs sitter. Communicating with house supervisor.

## 2021-12-28 NOTE — PROGRESS NOTES
ACUTE PHYSICAL THERAPY GOALS:  (Developed with and agreed upon by patient and/or caregiver.)  ALL GOALS MET ON EVALUATION AND TREATMENT 12/28/21  1. Patient will perform bed mobility with SUPERVISION within 7 days. 2. Patient will transfer with SUPERVISION within 7 days. 3. Patient will demonstrate FAIR DYNAMIC STANDING balance within 7 day(s). 4. Patient will ambulate 150+ using least restrictive assistive device and SUPERVISION within 7 days. PHYSICAL THERAPY ASSESSMENT: Initial Assessment, Daily Note, Discharge and AM PT Treatment Day # 1      Calvin Pascal is a 78 y.o. female   PRIMARY DIAGNOSIS: Unresponsive episode  Unresponsive episode [R41.89]  HTN (hypertension) [I10]       Reason for Referral:    ICD-10: Treatment Diagnosis: Generalized Muscle Weakness (M62.81)  Difficulty in walking, Not elsewhere classified (R26.2)  OBSERV[place] / [place] / Product Type: Medicare /     ASSESSMENT:     REHAB RECOMMENDATIONS:   Recommendation to date pending progress:  Setting:   No further skilled therapy   Equipment:    None     PRIOR LEVEL OF FUNCTION:  (Prior to Hospitalization) INITIAL/CURRENT LEVEL OF FUNCTION:  (Most Recently Demonstrated)   Bed Mobility:   Supervision  Sit to Stand:   Supervision  Transfers:   Supervision  Gait/Mobility:   Supervision Bed Mobility:   Supervision  Sit to Stand:   Supervision  Transfers:   Supervision  Gait/Mobility:   Supervision     ASSESSMENT:  Ms. Miguel Godwin is a pleasant 78year old female admitted with unresponsive episode with workup pending. Patient is seen this AM in the ER for intial PT evaluation. At baseline, Ms. Kyree An has a history significant for dementia and requires supervision for IADLs and ambulation without utilizing an assistive device. Today presents with intact functional strength, AROM, transfer status, and functional activity tolerance.  Mild dynamic balance impairment; fair to fair+ dynamically in standing. Patient is functioning at baseline. No continued skilled PT needs at this time. SUBJECTIVE:   Ms. Jeronimo Stewart states, \"[place]    SOCIAL HISTORY/LIVING ENVIRONMENT: Lives with her daughter. At baseline, Ms. Liliam Hassan has a history   significant for dementia and requires supervision for IADLs and ambulation without utilizing an assistive device.   Support Systems: Child(aura),Other Family Member(s)  OBJECTIVE:     PAIN: VITAL SIGNS: LINES/DRAINS:   Pre Treatment: Pain Screen  Pain Scale 1: Numeric (0 - 10)  Pain Intensity 1: 0  Post Treatment: 0/10   IV  O2 Device: None (Room air)     GROSS EVALUATION:   Within Functional Limits Abnormal/ Functional Abnormal/ Non-Functional (see comments) Not Tested Comments:   AROM [x] [] [] [] Mild ER of R LE   PROM [] [] [] []    Strength [x] [] [] []    Balance [] [x] [] [] Fair to fair+ dynamically in standing   Posture [x] [] [] []    Sensation [] [] [] [x]    Coordination [] [x] [] []    Tone [x] [] [] [x]    Edema [] [] [] []    Activity Tolerance [x] [] [] []     [] [] [] []      COGNITION/  PERCEPTION: Intact Impaired   (see comments) Comments:   Orientation [] [x] Oriented to person only; disoriented to date, place, and situation; easily redirectable    Vision [] [] Not formally assessed; attends to all visual spatial fields   Hearing [x] []    Command Following [] [x] Cues secondary to cognition   Safety Awareness [] [x] Cues secondary to cognition    [] []      MOBILITY: I Mod I S SBA CGA Min Mod Max Total  NT x2 Comments:   Bed Mobility    Rolling [] [] [] [] [] [] [] [] [] [x] []    Supine to Sit [] [] [] [] [] [] [] [] [] [x] []    Scooting [] [] [x] [] [] [] [] [] [] [] []    Sit to Supine [] [] [] [] [] [] [] [] [] [x] []    Transfers    Sit to Stand [] [] [x] [] [] [] [] [] [] [] []    Bed to Chair [] [] [x] [] [] [] [] [] [] [] []    Stand to Sit [] [] [x] [] [] [] [] [] [] [] []    I=Independent, Mod I=Modified Independent, S=Supervision, SBA=Standby Assistance, CGA=Contact Guard Assistance,   Min=Minimal Assistance, Mod=Moderate Assistance, Max=Maximal Assistance, Total=Total Assistance, NT=Not Tested  GAIT: I Mod I S SBA CGA Min Mod Max Total  NT x2 Comments:   Level of Assistance [] [] [x] [] [] [] [] [] [] [] []    Distance 150ft    DME None    Gait Quality Mild R LE ER, fair dynamic balance, normal mechanics    Weightbearing Status N/A     I=Independent, Mod I=Modified Independent, S=Supervision, SBA=Standby Assistance, CGA=Contact Guard Assistance,   Min=Minimal Assistance, Mod=Moderate Assistance, Max=Maximal Assistance, Total=Total Assistance, NT=Not Tested    [place] Form       How much difficulty does the patient currently have. .. Unable A Lot A Little None   1. Turning over in bed (including adjusting bedclothes, sheets and blankets)? [] 1   [] 2   [] 3   [x] 4   2. Sitting down on and standing up from a chair with arms ( e.g., wheelchair, bedside commode, etc.)   [] 1   [] 2   [] 3   [x] 4   3. Moving from lying on back to sitting on the side of the bed? [] 1   [] 2   [] 3   [x] 4   How much help from another person does the patient currently need. .. Total A Lot A Little None   4. Moving to and from a bed to a chair (including a wheelchair)? [] 1   [] 2   [x] 3   [] 4   5. Need to walk in hospital room? [] 1   [] 2   [x] 3   [] 4   6. Climbing 3-5 steps with a railing? [] 1   [] 2   [x] 3   [] 4   © 2007, Trustees of [facility] [employer], under license to [employer]. All rights reserved     Score:  Initial: 21 Most Recent: X (Date: -- )    Interpretation of Tool:  Represents activities that are increasingly more difficult (i.e. Bed mobility, Transfers, Gait).     PLAN:   FREQUENCY/DURATION: PT Plan of Care:  (Eval + Tx + D/C ) for duration of hospital stay or until stated goals are met, whichever comes first.    PROBLEM LIST:   (Skilled intervention is medically necessary to address:)  1. Decreased Balance  2. Decreased Cognition  3. Decreased Gait Ability   INTERVENTIONS PLANNED:   (Benefits and precautions of physical therapy have been discussed with the patient.)  1. Therapeutic Activity  2. Therapeutic Exercise/HEP  3. Neuromuscular Re-education  4. Gait Training  5. Manual Therapy  6. Education     TREATMENT:     EVALUATION: Low Complexity : (Untimed Charge)  FOR INITIAL EVALUATION ONLY: At this time, patient is appropriate for Co-treatment with occupational therapy due to patient's decreased overall endurance/tolerance levels, as well as need for high level skilled assistance to complete functional transfers/mobility and functional tasks. Marjorie Khan is appropriate for a multidisciplinary co-treatment of PT and OT to address goals of both disciplines. TREATMENT:   (     )  Gait Training (8 Minutes): Gait training for 150 feet utilizing None. Patient required Manual, Tactile, Verbal and Visual cueing to improve Activity Pacing, Assistive Device Utilization, Dynamic Standing Balance and Gait Mechanics.      TREATMENT GRID:  N/A    AFTER TREATMENT POSITION/PRECAUTIONS:  Bed, Needs within reach and RN notified    INTERDISCIPLINARY COLLABORATION:  RN/PCT, PT/PTA and OT/CABRERA    TOTAL TREATMENT DURATION:  PT Patient Time In/Time Out  Time In: 1050  Time Out: Onesimo 78, DPT

## 2021-12-28 NOTE — DISCHARGE SUMMARY
Hospitalist Discharge Summary   Admit Date:  2021  7:48 PM   DC Note date: 2021  Name:  Benja Beverly   Age:  78 y.o. Sex:  female  :  1940   MRN:  [id number]   Room:  [facility]  PCP:  Ankit Russell MD    Presenting Complaint: Dizziness    Initial Admission Diagnosis: Unresponsive episode [R41.89]  HTN (hypertension) [I10]     Problem List for this Hospitalization:  Hospital Problems as of 2021 Date Reviewed: 2021          Codes Class Noted - Resolved POA    Dementia ([facility] [address] ICD-10-CM: F03.90  ICD-9-CM: 294.20  2021 - Present Unknown        HTN (hypertension) ICD-10-CM: I10  ICD-9-CM: 401.9  2021 - Present Unknown        * (Principal) Unresponsive episode ICD-10-CM: R41.89  ICD-9-CM: 780.09  2021 - Present Unknown        Essential hypertension ICD-10-CM: I10  ICD-9-CM: 401.9  2015 - Present Unknown            Did Patient have Sepsis (YES OR NO): no     Hospital Course:    78years old female with history of  Dementia AA0 x2, hypertension currently living at home with daughter was folding laundry during that time patient had a unresponsive episode around 6:30 PM , patient was staring into space, witnessed by patient's son-in-law at bedside. No history of seizure-like activity but patient's speech was slurred when she started speaking. Patient was evaluated in emergency room, CT scan of head was obtained did not show any acute intracranial changes, CTA head and neck did not show any major concerns, no TPA was given by stroke neurologist, ER physician requested observation of this patient for further stroke work-up. Currently patient is around baseline which is oriented x2, able to follow commands without any difficulty. MRI showed no stroke. neurologist was consulted. Echo was normal with no shunt. Her EEG was reassuring but the sensitivity of EEG is low. She was treated with Keppra. PT recommended no needs at this time.   Patient is hemodynamically stable for discharge. Disposition: Home or Self Care  Diet: ADULT DIET Regular  Code Status: Full Code    Follow Up Orders: Follow-up Appointments   Procedures    FOLLOW UP VISIT Appointment in: One Month Neurology follow up with Dr. Cherelle Quiros at [facility]. Neurology follow up with Dr. Cherelle Quiros at [facility]. Standing Status:   Standing     Number of Occurrences:   1     Order Specific Question:   Appointment in     Answer:   One Month       Follow-up Information     Follow up With Specialties Details Why Contact Info    Isrrael Aparicio MD Internal Medicine   [address]  [address] [facility]  [phone]            Follow up labs/diagnostics (ultimately defer to outpatient provider): Follow-up with PCP in 3 to 5 days  Follow-up with a neurologist in 7 to 10 days    Time spent in patient discharge and coordination 30 minutes. Plan was discussed with patient's. All questions answered. Patient was stable at time of discharge. Instructions given to call a physician or return if any concerns. Discharge Info:   Current Discharge Medication List      START taking these medications    Details   levETIRAcetam (KEPPRA) 250 mg tablet Take 1 Tablet by mouth two (2) times a day for 30 days. Qty: 60 Tablet, Refills: 0  Start date: 12/28/2021, End date: 1/27/2022      melatonin 5 mg tablet Take 1 Tablet by mouth nightly for 30 days. Qty: 30 Tablet, Refills: 0  Start date: 12/28/2021, End date: 1/27/2022         CONTINUE these medications which have NOT CHANGED    Details   donepeziL (ARICEPT) 10 mg tablet Take 1 tablet by mouth nightly  Qty: 90 Tablet, Refills: 0    Associated Diagnoses: Late onset Alzheimer's disease with behavioral disturbance (HCC)      escitalopram oxalate (LEXAPRO) 10 mg tablet Take 1 Tablet by mouth daily.  Indications: anxiousness associated with depression  Qty: 90 Tablet, Refills: 4    Associated Diagnoses: Anxiety losartan (COZAAR) 25 mg tablet Take 1 Tablet by mouth daily. Qty: 90 Tablet, Refills: 4    Associated Diagnoses: Essential hypertension      risperiDONE (RisperDAL) 0.25 mg tablet Take 1 Tab by mouth two (2) times a day. Qty: 100 Tab, Refills: 4    Associated Diagnoses: Late onset Alzheimer's disease with behavioral disturbance (HCC)      memantine (NAMENDA) 10 mg tablet Take 1 Tab by mouth two (2) times a day. Qty: 180 Tab, Refills: 3    Associated Diagnoses: Late onset Alzheimer's disease without behavioral disturbance ([address]         STOP taking these medications       ciprofloxacin HCl (CIPRO) 250 mg tablet Comments:   Reason for Stopping:               Procedures done this admission:  * No surgery found *    Consults this admission:  IP CONSULT TO NEUROLOGY  IP CONSULT TO NEUROLOGY    Echocardiogram/EKG results:  Results from Hospital Encounter encounter on 12/26/21    ECHO ADULT COMPLETE    Interpretation Summary    Left Ventricle: Left ventricle size is normal. Normal wall thickness. Normal wall motion. The EF by visual approximation is 65 - 70%. Normal diastolic function.   Right Ventricle: Right ventricle size is normal. Normal systolic function.   Mitral Valve: Mild transvalvular regurgitation.   Interatrial Septum: Agitated saline study was negative with and without provocation. No evidence of intracardiac shunt.   Pericardium: Trivial pericardial effusion present. No indication of cardiac tamponade.        EKG Results     Procedure 720 Value Units Date/Time    Initial ECG [[id number]] Collected: 12/26/21 2013    Order Status: Completed Updated: 12/27/21 1424     Ventricular Rate 66 BPM      Atrial Rate 66 BPM      P-R Interval 141 ms      QRS Duration 77 ms      Q-T Interval 419 ms      QTC Calculation (Bezet) 439 ms      Calculated P Axis 52 degrees      Calculated R Axis -11 degrees      Calculated T Axis 27 degrees      Diagnosis --     Sinus rhythm    Confirmed by ST JONAH WATTERS MD (), KIM STEPHON ([id number]) on 12/27/2021 2:24:44 PM            Diagnostic Imaging/Tests:   ECHO ADULT COMPLETE    Result Date: 12/28/2021    Left Ventricle: Left ventricle size is normal. Normal wall thickness. Normal wall motion. The EF by visual approximation is 65 - 70%. Normal diastolic function.   Right Ventricle: Right ventricle size is normal. Normal systolic function.   Mitral Valve: Mild transvalvular regurgitation.   Interatrial Septum: Agitated saline study was negative with and without provocation. No evidence of intracardiac shunt.   Pericardium: Trivial pericardial effusion present. No indication of cardiac tamponade.        All Micro Results     None          Labs: Results:       BMP, Mg, Phos Recent Labs     12/28/21  0705 12/26/21 2015    137   K 3.7 3.9    101   CO2 29 31   AGAP 6* 5*   BUN 14 17   CREA 1.15* 1.19*   CA 9.1 8.8   * 111*   MG 2.3  --    PHOS 3.2  --       CBC Recent Labs     12/28/21 0705 12/26/21 2015   WBC 4.2* 4.3   RBC 4.02* 3.86*   HGB 12.3 11.9   HCT 38.0 36.9    231   GRANS  --  40*   LYMPH  --  43   EOS  --  4   MONOS  --  12   BASOS  --  1   IG  --  1   ANEU  --  1.7   ABL  --  1.8   YAHAIRA  --  0.2   ABM  --  0.5   ABB  --  0.1   AIG  --  0.0      LFT Recent Labs     12/26/21 2015   ALT 17      TP 7.0   ALB 3.0*   GLOB 4.0*   AGRAT 0.8*      Cardiac Testing Lab Results   Component Value Date/Time    BNP <5 07/09/2011 12:48 PM    Troponin-I <0.05 07/09/2011 12:48 PM    Troponin-I, Qt. <0.02 (L) 07/01/2014 03:35 PM      Coagulation Tests Lab Results   Component Value Date/Time    Prothrombin time 14.3 12/26/2021 08:15 PM    INR 1.1 12/26/2021 08:15 PM    INR (POC) 1.1 12/26/2021 08:20 PM      A1c Lab Results   Component Value Date/Time    Hemoglobin A1c 5.5 12/27/2021 03:27 AM      Lipid Panel Lab Results   Component Value Date/Time    Cholesterol, total 238 (H) 12/27/2021 03:27 AM    HDL Cholesterol 110 (H) 12/27/2021 03:27 AM    LDL, calculated 119.4 (H) 12/27/2021 03:27 AM    VLDL, calculated 8.6 12/27/2021 03:27 AM    Triglyceride 43 12/27/2021 03:27 AM    CHOL/HDL Ratio 2.2 12/27/2021 03:27 AM      Thyroid Panel Lab Results   Component Value Date/Time    TSH 1.940 09/28/2021 09:40 AM    TSH 2.990 10/02/2020 10:08 AM        Most Recent UA Lab Results   Component Value Date/Time    Color Yellow 09/28/2021 09:40 AM    Appearance Cloudy (A) 09/28/2021 09:40 AM    pH (UA) 6.5 09/28/2021 09:40 AM    Ketone Negative 09/28/2021 09:40 AM    Bilirubin Negative 09/28/2021 09:40 AM    Blood Trace (A) 09/28/2021 09:40 AM    Nitrites Positive (A) 09/28/2021 09:40 AM    Leukocyte Esterase 3+ (A) 09/28/2021 09:40 AM    WBC >30 (A) 09/28/2021 09:40 AM    RBC None seen 09/28/2021 09:40 AM    Epithelial cells None seen 09/28/2021 09:40 AM    Bacteria Few 09/28/2021 09:40 AM    Casts 0 07/09/2011 02:05 PM    Crystals, urine 0 07/09/2011 02:05 PM    Mucus Present 10/02/2020 10:08 AM          All Labs from Last 24 Hrs:  Recent Results (from the past 24 hour(s))   CBC W/O DIFF    Collection Time: 12/28/21  7:05 AM   Result Value Ref Range    WBC 4.2 (L) 4.3 - 11.1 K/uL    RBC 4.02 (L) 4.05 - 5.2 M/uL    HGB 12.3 11.7 - 15.4 g/dL    HCT 38.0 35.8 - 46.3 %    MCV 94.5 79.6 - 97.8 FL    MCH 30.6 26.1 - 32.9 PG    MCHC 32.4 31.4 - 35.0 g/dL    RDW 13.1 11.9 - 14.6 %    PLATELET 149 283 - 261 K/uL    MPV 9.3 (L) 9.4 - 12.3 FL    ABSOLUTE NRBC 0.00 0.0 - 0.2 K/uL   METABOLIC PANEL, BASIC    Collection Time: 12/28/21  7:05 AM   Result Value Ref Range    Sodium 138 136 - 145 mmol/L    Potassium 3.7 3.5 - 5.1 mmol/L    Chloride 103 98 - 107 mmol/L    CO2 29 21 - 32 mmol/L    Anion gap 6 (L) 7 - 16 mmol/L    Glucose 102 (H) 65 - 100 mg/dL    BUN 14 8 - 23 MG/DL    Creatinine 1.15 (H) 0.6 - 1.0 MG/DL    GFR est AA 58 (L) >60 ml/min/1.73m2    GFR est non-AA 48 (L) >60 ml/min/1.73m2    Calcium 9.1 8.3 - 10.4 MG/DL   MAGNESIUM    Collection Time: 12/28/21  7:05 AM   Result Value Ref Range Magnesium 2.3 1.8 - 2.4 mg/dL   PHOSPHORUS    Collection Time: 12/28/21  7:05 AM   Result Value Ref Range    Phosphorus 3.2 2.3 - 3.7 MG/DL   ECHO ADULT COMPLETE    Collection Time: 12/28/21 10:04 AM   Result Value Ref Range    IVSd 0.9 0.6 - 0.9 cm    LVIDd 3.6 (A) 3.9 - 5.3 cm    LVIDs 2.5 cm    LVOT Diameter 1.7 cm    LVPWd 0.8 0.6 - 0.9 cm    LVOT SV 46.7 ml    EF BP 62 55 - 100 %    EF BP 62 55 - 100 %    LV Ejection Fraction A2C 63 %    LV Ejection Fraction A4C 60 %    LV EDV A2C 59 mL    LV EDV A4C 56 mL    LV EDV BP 58 56 - 104 mL    LV EDV BP 58 56 - 104 mL    LV ESV A2C 22 mL    LV ESV A4C 22 mL    LV ESV BP 22 19 - 49 mL    LVOT Peak Gradient 3 mmHg    LVOT Mean Gradient 2 mmHg    LVOT Peak Velocity 0.9 m/s    LVOT VTI 20.6 cm    RVIDd 2.8 cm    RV Free Wall Peak S' 16 cm/s    LA Diameter 2.6 cm    AV Area by Peak Velocity 1.6 cm2    AV Area by Peak Velocity 1.6 cm2    AV Area by VTI 1.9 cm2    AV Area by VTI 1.9 cm2    AV Peak Gradient 7 mmHg    AV Mean Gradient 4 mmHg    AV Peak Velocity 1.3 m/s    AV Mean Velocity 0.9 m/s    AV VTI 25.7 cm    MV A Velocity 1.12 m/s    MV E Wave Deceleration Time 217.9 ms    MV E Velocity 0.68 m/s    LV E' Lateral Velocity 8 cm/s    LV E' Septal Velocity 7 cm/s    MV PHT 63.2 ms    MV Area by PHT 3.5 cm2    Pulmonary Artery EDP 4 mmHg    MN Max Velocity 1.0 m/s    TAPSE 1.7 1.5 - 2.0 cm    TR Peak Gradient 32 mmHg    TR Max Velocity 2.82 m/s    Ascending Aorta 3.2 cm    Aortic Root 2.7 cm    Fractional Shortening 2D 31 28 - 44 %    LV ESV Index BP 13 mL/m2    LV ESV Index A4C 13 mL/m2    LV EDV Index A4C 34 mL/m2    LV ESV Index A2C 13 mL/m2    LV EDV Index A2C 36 mL/m2    LVIDd Index 2.18 cm/m2    LVIDs Index 1.52 cm/m2    LV RWT Ratio 0.44     LV Mass 2D 85.6 67 - 162 g    LV Mass 2D Index 51.9 43 - 95 g/m2    MV E/A 0.61     E/E' Ratio (Averaged) 9.11     E/E' Lateral 8.50     E/E' Septal 9.71     LVOT Stroke Volume Index 28.3 mL/m2    LVOT Area 2.3 cm2    LA Size Index 1.58 cm/m2    LA/AO Root Ratio 0.96     Ao Root Index 1.64 cm/m2    Ascending Aorta Index 1.94 cm/m2    AV Velocity Ratio 0.69     LVOT:AV VTI Index 0.80     Est. RA Pressure 3 mmHg    RVSP 35 mmHg       Current Med List in Hospital:   Current Facility-Administered Medications   Medication Dose Route Frequency    levETIRAcetam (KEPPRA) tablet 250 mg  250 mg Oral BID    memantine (NAMENDA) tablet 10 mg  10 mg Oral BID    losartan (COZAAR) tablet 25 mg  25 mg Oral DAILY    risperiDONE (RisperDAL) tablet 0.25 mg  0.25 mg Oral BID    donepeziL (ARICEPT) tablet 10 mg  10 mg Oral QHS    escitalopram oxalate (LEXAPRO) tablet 10 mg  10 mg Oral DAILY    atorvastatin (LIPITOR) tablet 80 mg  80 mg Oral DAILY    tuberculin injection 5 Units  5 Units IntraDERMal ONCE    melatonin tablet 5 mg  5 mg Oral QHS    sodium chloride (NS) flush 5-40 mL  5-40 mL IntraVENous Q8H    sodium chloride (NS) flush 5-40 mL  5-40 mL IntraVENous PRN    aspirin chewable tablet 81 mg  81 mg Oral DAILY    heparin (porcine) injection 5,000 Units  5,000 Units SubCUTAneous Q8H       Allergies   Allergen Reactions    Lisinopril Cough     Immunization History   Administered Date(s) Administered    COVID-19, Moderna, Primary or Immunocompromised Series, MRNA, PF, 100mcg/0.5mL 03/19/2021, 04/16/2021    Influenza High Dose Vaccine PF 10/09/2017, 10/12/2018, 01/15/2019, 11/06/2019    Influenza Vaccine 01/01/2015    Influenza, Quadrivalent, Adjuvanted (>65 Yrs FLUAD QUAD 40740) 10/02/2020, 09/28/2021    Pneumococcal Conjugate (PCV-13) 11/06/2015, 11/06/2019    Pneumococcal Polysaccharide (PPSV-23) 01/01/2006       Recent Vital Data:  Patient Vitals for the past 24 hrs:   Temp Pulse Resp BP SpO2   12/28/21 1124     96 %   12/28/21 1123    (!) 144/82    12/28/21 0741 98.4 °F (36.9 °C) 86 16 (!) 148/98 98 %   12/28/21 0419    (!) 163/94    12/28/21 0417    (!) 154/100 98 %   12/27/21 2121  88 18 (!) 160/84 98 %   12/27/21 1200 Yenny Gasca (!) 155/84 95 %     Oxygen Therapy  O2 Sat (%): 96 % (12/28/21 1124)  Pulse via Oximetry: 83 beats per minute (12/28/21 1124)  O2 Device: None (Room air) (12/28/21 3028)    Estimated body mass index is 25.61 kg/m² as calculated from the following:    Height as of this encounter: 5' 2\" (1.575 m). Weight as of this encounter: 63.5 kg (140 lb). No intake or output data in the 24 hours ending 12/28/21 1605      Physical Exam:  General:    Well nourished. No overt distress  Head:  Normocephalic, atraumatic  Eyes:  Sclerae appear normal.  Pupils equally round. HENT:  Nares appear normal, no drainage. Moist mucous membranes  Neck:  No restricted ROM. Trachea midline  CV:   RRR. No m/r/g. No JVD  Lungs:   CTAB. No wheezing, rhonchi, or rales. Even, unlabored  Abdomen:   Soft, nontender, nondistended. Extremities: Warm and dry. No cyanosis or clubbing. No edema. Skin:     No rashes. Normal coloration  Neuro:  CN II-XII grossly intact. Psych:  Normal mood and affect. Signed:  Cecil Colon MD    Part of this note may have been written by using a voice dictation software. The note has been proof read but may still contain some grammatical/other typographical errors.

## 2021-12-28 NOTE — ED NOTES
TRANSFER - OUT REPORT:    Verbal report given to Yessica SPAULDING (name) on [address]  being transferred to [facility](unit) for routine progression of care. Report consisted of patients Situation, Background, Assessment and   Recommendations(SBAR). Information from the following report(s) SBAR was reviewed with the receiving nurse. Lines:   Peripheral IV 12/26/21 (Active)   Site Assessment Clean, dry, & intact 12/26/21 2013   Phlebitis Assessment 0 12/26/21 2013   Infiltration Assessment 0 12/26/21 2013   Dressing Status Clean, dry, & intact 12/26/21 2013   Dressing Type Transparent 12/26/21 2013   Hub Color/Line Status Pink 12/26/21 2013       Peripheral IV 12/26/21 Anterior;Proximal;Right Antecubital (Active)        Opportunity for questions and clarification was provided.       Patient transported with:   [employer]

## 2021-12-31 ENCOUNTER — HOSPITAL ENCOUNTER (OUTPATIENT)
Dept: INFUSION THERAPY | Age: 81
Discharge: HOME | End: 2021-12-31
Payer: MEDICARE

## 2021-12-31 VITALS
HEART RATE: 75 BPM | OXYGEN SATURATION: 98 % | RESPIRATION RATE: 20 BRPM | TEMPERATURE: 97.8 F | DIASTOLIC BLOOD PRESSURE: 58 MMHG | SYSTOLIC BLOOD PRESSURE: 104 MMHG

## 2021-12-31 PROCEDURE — 74011250636 HC RX REV CODE- 250/636: Performed by: INTERNAL MEDICINE

## 2021-12-31 PROCEDURE — 96372 THER/PROPH/DIAG INJ SC/IM: CPT

## 2021-12-31 RX ADMIN — DENOSUMAB 60 MG: 60 INJECTION SUBCUTANEOUS at 13:43

## 2021-12-31 NOTE — PROGRESS NOTES
Patient arrived ambulatory to infusion area. Prolia injection completed. VS stable. Discharged home ambulatory.

## 2022-02-03 PROBLEM — G40.209 NONINTRACTABLE EPILEPSY WITH COMPLEX PARTIAL SEIZURES (HCC): Status: ACTIVE | Noted: 2022-02-03

## 2022-03-18 PROBLEM — I10 HTN (HYPERTENSION): Status: ACTIVE | Noted: 2021-12-26

## 2022-03-18 PROBLEM — U07.1 COVID-19: Status: ACTIVE | Noted: 2021-01-05

## 2022-03-18 PROBLEM — G30.1 LATE ONSET ALZHEIMER'S DISEASE WITH BEHAVIORAL DISTURBANCE (HCC): Status: ACTIVE | Noted: 2019-06-03

## 2022-03-18 PROBLEM — R25.3 MUSCLE TWITCHING: Status: ACTIVE | Noted: 2020-07-08

## 2022-03-18 PROBLEM — R41.89 UNRESPONSIVE EPISODE: Status: ACTIVE | Noted: 2021-12-26

## 2022-03-18 PROBLEM — Z82.0 FAMILY HX OF ALS (AMYOTROPHIC LATERAL SCLEROSIS): Status: ACTIVE | Noted: 2019-11-13

## 2022-03-18 PROBLEM — F02.818 LATE ONSET ALZHEIMER'S DISEASE WITH BEHAVIORAL DISTURBANCE (HCC): Status: ACTIVE | Noted: 2019-06-03

## 2022-03-19 PROBLEM — F33.1 MODERATE EPISODE OF RECURRENT MAJOR DEPRESSIVE DISORDER (HCC): Status: ACTIVE | Noted: 2019-03-15

## 2022-03-19 PROBLEM — F03.90 DEMENTIA (HCC): Status: ACTIVE | Noted: 2021-12-27

## 2022-03-19 PROBLEM — H91.93 BILATERAL HEARING LOSS: Status: ACTIVE | Noted: 2018-07-13

## 2022-03-19 PROBLEM — H61.21 IMPACTED CERUMEN OF RIGHT EAR: Status: ACTIVE | Noted: 2020-10-02

## 2022-03-19 PROBLEM — G40.209 NONINTRACTABLE EPILEPSY WITH COMPLEX PARTIAL SEIZURES (HCC): Status: ACTIVE | Noted: 2022-02-03

## 2022-03-19 PROBLEM — G62.9 AXONAL POLYNEUROPATHY: Status: ACTIVE | Noted: 2020-09-15

## 2022-03-19 PROBLEM — N28.9 RENAL INSUFFICIENCY, MILD: Status: ACTIVE | Noted: 2019-06-19

## 2022-03-19 PROBLEM — R41.0 INTERMITTENT CONFUSION: Status: ACTIVE | Noted: 2019-03-15

## 2022-03-19 PROBLEM — R32 URINARY INCONTINENCE: Status: ACTIVE | Noted: 2021-01-05

## 2022-03-19 PROBLEM — F41.9 ANXIETY: Status: ACTIVE | Noted: 2017-10-06

## 2022-03-19 PROBLEM — R25.3 FASCICULATIONS OF MUSCLE: Status: ACTIVE | Noted: 2019-11-13

## 2022-05-02 ENCOUNTER — HOSPITAL ENCOUNTER (INPATIENT)
Age: 82
LOS: 1 days | Discharge: HOME | DRG: 101 | End: 2022-05-04
Attending: EMERGENCY MEDICINE | Admitting: FAMILY MEDICINE
Payer: MEDICARE

## 2022-05-02 DIAGNOSIS — G40.209 PARTIAL SYMPTOMATIC EPILEPSY WITH COMPLEX PARTIAL SEIZURES, NOT INTRACTABLE, WITHOUT STATUS EPILEPTICUS (HCC): ICD-10-CM

## 2022-05-02 DIAGNOSIS — F41.9 ANXIETY AND DEPRESSION: ICD-10-CM

## 2022-05-02 DIAGNOSIS — G30.1 LATE ONSET ALZHEIMER'S DISEASE WITH BEHAVIORAL DISTURBANCE (HCC): ICD-10-CM

## 2022-05-02 DIAGNOSIS — R29.810 FACIAL DROOP: ICD-10-CM

## 2022-05-02 DIAGNOSIS — R53.1 LEFT-SIDED WEAKNESS: Primary | ICD-10-CM

## 2022-05-02 DIAGNOSIS — F02.818 LATE ONSET ALZHEIMER'S DISEASE WITH BEHAVIORAL DISTURBANCE (HCC): ICD-10-CM

## 2022-05-02 DIAGNOSIS — F32.A ANXIETY AND DEPRESSION: ICD-10-CM

## 2022-05-02 PROCEDURE — 96361 HYDRATE IV INFUSION ADD-ON: CPT

## 2022-05-02 PROCEDURE — 96360 HYDRATION IV INFUSION INIT: CPT

## 2022-05-02 PROCEDURE — 99285 EMERGENCY DEPT VISIT HI MDM: CPT

## 2022-05-03 ENCOUNTER — APPOINTMENT (OUTPATIENT)
Dept: MRI IMAGING | Age: 82
DRG: 101 | End: 2022-05-03
Attending: FAMILY MEDICINE
Payer: MEDICARE

## 2022-05-03 ENCOUNTER — APPOINTMENT (OUTPATIENT)
Dept: CT IMAGING | Age: 82
DRG: 101 | End: 2022-05-03
Attending: EMERGENCY MEDICINE
Payer: MEDICARE

## 2022-05-03 ENCOUNTER — APPOINTMENT (OUTPATIENT)
Dept: NON INVASIVE DIAGNOSTICS | Age: 82
DRG: 101 | End: 2022-05-03
Attending: FAMILY MEDICINE
Payer: MEDICARE

## 2022-05-03 PROBLEM — G30.1 LATE ONSET ALZHEIMER'S DISEASE WITH BEHAVIORAL DISTURBANCE (HCC): Chronic | Status: ACTIVE | Noted: 2019-06-03

## 2022-05-03 PROBLEM — R29.90 STROKE-LIKE EPISODE: Status: ACTIVE | Noted: 2022-05-03

## 2022-05-03 PROBLEM — F02.818 LATE ONSET ALZHEIMER'S DISEASE WITH BEHAVIORAL DISTURBANCE (HCC): Chronic | Status: ACTIVE | Noted: 2019-06-03

## 2022-05-03 PROBLEM — F03.90 DEMENTIA (HCC): Chronic | Status: ACTIVE | Noted: 2021-12-27

## 2022-05-03 PROBLEM — G40.209 NONINTRACTABLE EPILEPSY WITH COMPLEX PARTIAL SEIZURES (HCC): Chronic | Status: ACTIVE | Noted: 2022-02-03

## 2022-05-03 LAB
ALBUMIN SERPL-MCNC: 3.2 G/DL (ref 3.2–4.6)
ALBUMIN/GLOB SERPL: 0.7 {RATIO} (ref 1.2–3.5)
ALP SERPL-CCNC: 77 U/L (ref 50–136)
ALT SERPL-CCNC: 17 U/L (ref 12–65)
ANION GAP SERPL CALC-SCNC: 7 MMOL/L (ref 7–16)
APPEARANCE UR: CLEAR
AST SERPL-CCNC: 15 U/L (ref 15–37)
ATRIAL RATE: 66 BPM
BACTERIA URNS QL MICRO: 0 /HPF
BASOPHILS # BLD: 0 K/UL (ref 0–0.2)
BASOPHILS NFR BLD: 1 % (ref 0–2)
BILIRUB SERPL-MCNC: 0.6 MG/DL (ref 0.2–1.1)
BILIRUB UR QL: NEGATIVE
BNP SERPL-MCNC: 134 PG/ML
BUN SERPL-MCNC: 18 MG/DL (ref 8–23)
CALCIUM SERPL-MCNC: 9.2 MG/DL (ref 8.3–10.4)
CALCULATED P AXIS, ECG09: 81 DEGREES
CALCULATED R AXIS, ECG10: 60 DEGREES
CALCULATED T AXIS, ECG11: 68 DEGREES
CASTS URNS QL MICRO: 0 /LPF
CHLORIDE SERPL-SCNC: 103 MMOL/L (ref 98–107)
CHOLEST SERPL-MCNC: 242 MG/DL
CO2 SERPL-SCNC: 28 MMOL/L (ref 21–32)
COLOR UR: YELLOW
CREAT SERPL-MCNC: 1.15 MG/DL (ref 0.6–1)
DIAGNOSIS, 93000: NORMAL
DIFFERENTIAL METHOD BLD: ABNORMAL
EOSINOPHIL # BLD: 0.1 K/UL (ref 0–0.8)
EOSINOPHIL NFR BLD: 3 % (ref 0.5–7.8)
EPI CELLS #/AREA URNS HPF: 0 /HPF
ERYTHROCYTE [DISTWIDTH] IN BLOOD BY AUTOMATED COUNT: 12.9 % (ref 11.9–14.6)
EST. AVERAGE GLUCOSE BLD GHB EST-MCNC: 114 MG/DL
GLOBULIN SER CALC-MCNC: 4.4 G/DL (ref 2.3–3.5)
GLUCOSE BLD STRIP.AUTO-MCNC: 101 MG/DL (ref 65–100)
GLUCOSE BLD STRIP.AUTO-MCNC: 102 MG/DL (ref 65–100)
GLUCOSE BLD STRIP.AUTO-MCNC: 115 MG/DL (ref 65–100)
GLUCOSE BLD STRIP.AUTO-MCNC: 94 MG/DL (ref 65–100)
GLUCOSE BLD STRIP.AUTO-MCNC: 96 MG/DL (ref 65–100)
GLUCOSE SERPL-MCNC: 100 MG/DL (ref 65–100)
GLUCOSE UR STRIP.AUTO-MCNC: NEGATIVE MG/DL
HBA1C MFR BLD: 5.6 % (ref 4.2–6.3)
HCT VFR BLD AUTO: 39.1 % (ref 35.8–46.3)
HDLC SERPL-MCNC: 99 MG/DL (ref 40–60)
HDLC SERPL: 2.4 {RATIO}
HGB BLD-MCNC: 12.9 G/DL (ref 11.7–15.4)
HGB UR QL STRIP: NEGATIVE
IMM GRANULOCYTES # BLD AUTO: 0 K/UL (ref 0–0.5)
IMM GRANULOCYTES NFR BLD AUTO: 0 % (ref 0–5)
INR BLD: 0.9 (ref 0.9–1.2)
INR PPP: 1
KETONES UR QL STRIP.AUTO: NEGATIVE MG/DL
LDLC SERPL CALC-MCNC: 128 MG/DL
LEUKOCYTE ESTERASE UR QL STRIP.AUTO: ABNORMAL
LIPASE SERPL-CCNC: 237 U/L (ref 73–393)
LYMPHOCYTES # BLD: 2 K/UL (ref 0.5–4.6)
LYMPHOCYTES NFR BLD: 40 % (ref 13–44)
MAGNESIUM SERPL-MCNC: 2.2 MG/DL (ref 1.8–2.4)
MCH RBC QN AUTO: 30.6 PG (ref 26.1–32.9)
MCHC RBC AUTO-ENTMCNC: 33 G/DL (ref 31.4–35)
MCV RBC AUTO: 92.7 FL (ref 79.6–97.8)
MONOCYTES # BLD: 0.5 K/UL (ref 0.1–1.3)
MONOCYTES NFR BLD: 10 % (ref 4–12)
NEUTS SEG # BLD: 2.4 K/UL (ref 1.7–8.2)
NEUTS SEG NFR BLD: 47 % (ref 43–78)
NITRITE UR QL STRIP.AUTO: NEGATIVE
NRBC # BLD: 0 K/UL (ref 0–0.2)
P-R INTERVAL, ECG05: 160 MS
PH UR STRIP: 8.5 [PH] (ref 5–9)
PLATELET # BLD AUTO: 250 K/UL (ref 150–450)
PMV BLD AUTO: 9.3 FL (ref 9.4–12.3)
POTASSIUM SERPL-SCNC: 4.1 MMOL/L (ref 3.5–5.1)
PROT SERPL-MCNC: 7.6 G/DL (ref 6.3–8.2)
PROT UR STRIP-MCNC: NEGATIVE MG/DL
PROTHROMBIN TIME: 13.6 SEC (ref 12.6–14.5)
PT BLD: 11.3 SECS (ref 9.6–11.6)
Q-T INTERVAL, ECG07: 418 MS
QRS DURATION, ECG06: 72 MS
QTC CALCULATION (BEZET), ECG08: 438 MS
RBC # BLD AUTO: 4.22 M/UL (ref 4.05–5.2)
RBC #/AREA URNS HPF: 0 /HPF
SERVICE CMNT-IMP: ABNORMAL
SERVICE CMNT-IMP: NORMAL
SERVICE CMNT-IMP: NORMAL
SODIUM SERPL-SCNC: 138 MMOL/L (ref 136–145)
SP GR UR REFRACTOMETRY: 1.01 (ref 1–1.02)
TRIGL SERPL-MCNC: 75 MG/DL (ref 35–150)
TROPONIN-HIGH SENSITIVITY: 15 PG/ML (ref 0–14)
TROPONIN-HIGH SENSITIVITY: 15.5 PG/ML (ref 0–14)
UROBILINOGEN UR QL STRIP.AUTO: 0.2 EU/DL (ref 0.2–1)
VENTRICULAR RATE, ECG03: 66 BPM
VLDLC SERPL CALC-MCNC: 15 MG/DL (ref 6–23)
WBC # BLD AUTO: 5.1 K/UL (ref 4.3–11.1)
WBC URNS QL MICRO: ABNORMAL /HPF

## 2022-05-03 PROCEDURE — 97165 OT EVAL LOW COMPLEX 30 MIN: CPT

## 2022-05-03 PROCEDURE — 85025 COMPLETE CBC W/AUTO DIFF WBC: CPT

## 2022-05-03 PROCEDURE — 83735 ASSAY OF MAGNESIUM: CPT

## 2022-05-03 PROCEDURE — 65270000029 HC RM PRIVATE

## 2022-05-03 PROCEDURE — 0042T CT PERF W CBF: CPT

## 2022-05-03 PROCEDURE — 85610 PROTHROMBIN TIME: CPT

## 2022-05-03 PROCEDURE — 83036 HEMOGLOBIN GLYCOSYLATED A1C: CPT

## 2022-05-03 PROCEDURE — 84484 ASSAY OF TROPONIN QUANT: CPT

## 2022-05-03 PROCEDURE — 93005 ELECTROCARDIOGRAM TRACING: CPT | Performed by: EMERGENCY MEDICINE

## 2022-05-03 PROCEDURE — 83880 ASSAY OF NATRIURETIC PEPTIDE: CPT

## 2022-05-03 PROCEDURE — 74011000258 HC RX REV CODE- 258: Performed by: EMERGENCY MEDICINE

## 2022-05-03 PROCEDURE — 74011250636 HC RX REV CODE- 250/636: Performed by: FAMILY MEDICINE

## 2022-05-03 PROCEDURE — 81001 URINALYSIS AUTO W/SCOPE: CPT

## 2022-05-03 PROCEDURE — 97530 THERAPEUTIC ACTIVITIES: CPT

## 2022-05-03 PROCEDURE — 74011000636 HC RX REV CODE- 636: Performed by: EMERGENCY MEDICINE

## 2022-05-03 PROCEDURE — 4A03X5D MEASUREMENT OF ARTERIAL FLOW, INTRACRANIAL, EXTERNAL APPROACH: ICD-10-PCS | Performed by: RADIOLOGY

## 2022-05-03 PROCEDURE — 74011250637 HC RX REV CODE- 250/637: Performed by: FAMILY MEDICINE

## 2022-05-03 PROCEDURE — 74011250637 HC RX REV CODE- 250/637: Performed by: HOSPITALIST

## 2022-05-03 PROCEDURE — 70551 MRI BRAIN STEM W/O DYE: CPT

## 2022-05-03 PROCEDURE — 74011000250 HC RX REV CODE- 250: Performed by: FAMILY MEDICINE

## 2022-05-03 PROCEDURE — 97535 SELF CARE MNGMENT TRAINING: CPT

## 2022-05-03 PROCEDURE — 82962 GLUCOSE BLOOD TEST: CPT

## 2022-05-03 PROCEDURE — 94760 N-INVAS EAR/PLS OXIMETRY 1: CPT

## 2022-05-03 PROCEDURE — 83690 ASSAY OF LIPASE: CPT

## 2022-05-03 PROCEDURE — 97161 PT EVAL LOW COMPLEX 20 MIN: CPT

## 2022-05-03 PROCEDURE — 80053 COMPREHEN METABOLIC PANEL: CPT

## 2022-05-03 PROCEDURE — 80061 LIPID PANEL: CPT

## 2022-05-03 PROCEDURE — 92610 EVALUATE SWALLOWING FUNCTION: CPT

## 2022-05-03 PROCEDURE — 70496 CT ANGIOGRAPHY HEAD: CPT

## 2022-05-03 PROCEDURE — 74011250636 HC RX REV CODE- 250/636: Performed by: EMERGENCY MEDICINE

## 2022-05-03 PROCEDURE — 70450 CT HEAD/BRAIN W/O DYE: CPT

## 2022-05-03 RX ORDER — ENOXAPARIN SODIUM 100 MG/ML
30 INJECTION SUBCUTANEOUS EVERY 24 HOURS
Status: DISCONTINUED | OUTPATIENT
Start: 2022-05-03 | End: 2022-05-04 | Stop reason: HOSPADM

## 2022-05-03 RX ORDER — ASPIRIN 325 MG
325 TABLET ORAL DAILY
Status: DISCONTINUED | OUTPATIENT
Start: 2022-05-03 | End: 2022-05-04 | Stop reason: HOSPADM

## 2022-05-03 RX ORDER — LEVETIRACETAM 500 MG/1
500 TABLET ORAL 2 TIMES DAILY
Status: DISCONTINUED | OUTPATIENT
Start: 2022-05-03 | End: 2022-05-04 | Stop reason: HOSPADM

## 2022-05-03 RX ORDER — DONEPEZIL HYDROCHLORIDE 5 MG/1
10 TABLET, FILM COATED ORAL
Status: DISCONTINUED | OUTPATIENT
Start: 2022-05-03 | End: 2022-05-04 | Stop reason: HOSPADM

## 2022-05-03 RX ORDER — BISACODYL 5 MG
5 TABLET, DELAYED RELEASE (ENTERIC COATED) ORAL DAILY PRN
Status: DISCONTINUED | OUTPATIENT
Start: 2022-05-03 | End: 2022-05-04 | Stop reason: HOSPADM

## 2022-05-03 RX ORDER — LOSARTAN POTASSIUM 25 MG/1
25 TABLET ORAL DAILY
Status: DISCONTINUED | OUTPATIENT
Start: 2022-05-03 | End: 2022-05-03

## 2022-05-03 RX ORDER — LEVETIRACETAM 250 MG/1
250 TABLET ORAL EVERY MORNING
Status: DISCONTINUED | OUTPATIENT
Start: 2022-05-03 | End: 2022-05-03

## 2022-05-03 RX ORDER — SODIUM CHLORIDE 0.9 % (FLUSH) 0.9 %
5-40 SYRINGE (ML) INJECTION AS NEEDED
Status: DISCONTINUED | OUTPATIENT
Start: 2022-05-03 | End: 2022-05-04 | Stop reason: HOSPADM

## 2022-05-03 RX ORDER — RISPERIDONE 0.5 MG/1
0.25 TABLET, FILM COATED ORAL 2 TIMES DAILY
Status: DISCONTINUED | OUTPATIENT
Start: 2022-05-03 | End: 2022-05-04 | Stop reason: HOSPADM

## 2022-05-03 RX ORDER — ONDANSETRON 2 MG/ML
4 INJECTION INTRAMUSCULAR; INTRAVENOUS
Status: DISCONTINUED | OUTPATIENT
Start: 2022-05-03 | End: 2022-05-04 | Stop reason: HOSPADM

## 2022-05-03 RX ORDER — ACETAMINOPHEN 325 MG/1
650 TABLET ORAL
Status: DISCONTINUED | OUTPATIENT
Start: 2022-05-03 | End: 2022-05-04 | Stop reason: HOSPADM

## 2022-05-03 RX ORDER — LEVETIRACETAM 500 MG/1
500 TABLET ORAL
Status: DISCONTINUED | OUTPATIENT
Start: 2022-05-03 | End: 2022-05-03

## 2022-05-03 RX ORDER — MEMANTINE HYDROCHLORIDE 5 MG/1
10 TABLET ORAL DAILY
Status: DISCONTINUED | OUTPATIENT
Start: 2022-05-03 | End: 2022-05-04 | Stop reason: HOSPADM

## 2022-05-03 RX ORDER — LOSARTAN POTASSIUM 50 MG/1
50 TABLET ORAL DAILY
Status: DISCONTINUED | OUTPATIENT
Start: 2022-05-03 | End: 2022-05-04 | Stop reason: HOSPADM

## 2022-05-03 RX ORDER — SODIUM CHLORIDE 0.9 % (FLUSH) 0.9 %
5-40 SYRINGE (ML) INJECTION EVERY 8 HOURS
Status: DISCONTINUED | OUTPATIENT
Start: 2022-05-03 | End: 2022-05-04 | Stop reason: HOSPADM

## 2022-05-03 RX ORDER — ATORVASTATIN CALCIUM 40 MG/1
40 TABLET, FILM COATED ORAL
Status: DISCONTINUED | OUTPATIENT
Start: 2022-05-03 | End: 2022-05-04 | Stop reason: HOSPADM

## 2022-05-03 RX ORDER — ESCITALOPRAM OXALATE 10 MG/1
10 TABLET ORAL DAILY
Status: DISCONTINUED | OUTPATIENT
Start: 2022-05-03 | End: 2022-05-04 | Stop reason: HOSPADM

## 2022-05-03 RX ORDER — SODIUM CHLORIDE 0.9 % (FLUSH) 0.9 %
10 SYRINGE (ML) INJECTION
Status: COMPLETED | OUTPATIENT
Start: 2022-05-03 | End: 2022-05-03

## 2022-05-03 RX ADMIN — RISPERIDONE 0.25 MG: 0.5 TABLET ORAL at 09:08

## 2022-05-03 RX ADMIN — RISPERIDONE 0.25 MG: 0.5 TABLET ORAL at 18:01

## 2022-05-03 RX ADMIN — ENOXAPARIN SODIUM 30 MG: 100 INJECTION SUBCUTANEOUS at 05:29

## 2022-05-03 RX ADMIN — SODIUM CHLORIDE, PRESERVATIVE FREE 10 ML: 5 INJECTION INTRAVENOUS at 21:07

## 2022-05-03 RX ADMIN — LOSARTAN POTASSIUM 50 MG: 50 TABLET, FILM COATED ORAL at 09:08

## 2022-05-03 RX ADMIN — LEVETIRACETAM 500 MG: 100 INJECTION, SOLUTION INTRAVENOUS at 04:09

## 2022-05-03 RX ADMIN — SODIUM CHLORIDE 500 ML: 900 INJECTION, SOLUTION INTRAVENOUS at 00:57

## 2022-05-03 RX ADMIN — MEMANTINE 10 MG: 5 TABLET ORAL at 09:08

## 2022-05-03 RX ADMIN — SODIUM CHLORIDE 100 ML: 9 INJECTION, SOLUTION INTRAVENOUS at 03:06

## 2022-05-03 RX ADMIN — LEVETIRACETAM 500 MG: 500 TABLET, FILM COATED ORAL at 09:08

## 2022-05-03 RX ADMIN — DONEPEZIL HYDROCHLORIDE 10 MG: 5 TABLET, FILM COATED ORAL at 21:07

## 2022-05-03 RX ADMIN — SODIUM CHLORIDE, PRESERVATIVE FREE 10 ML: 5 INJECTION INTRAVENOUS at 05:29

## 2022-05-03 RX ADMIN — ESCITALOPRAM OXALATE 10 MG: 10 TABLET ORAL at 09:09

## 2022-05-03 RX ADMIN — ASPIRIN 325 MG: 325 TABLET, FILM COATED ORAL at 09:08

## 2022-05-03 RX ADMIN — IOPAMIDOL 100 ML: 755 INJECTION, SOLUTION INTRAVENOUS at 03:06

## 2022-05-03 RX ADMIN — ATORVASTATIN CALCIUM 40 MG: 40 TABLET, FILM COATED ORAL at 21:07

## 2022-05-03 RX ADMIN — Medication 10 ML: at 03:06

## 2022-05-03 RX ADMIN — LEVETIRACETAM 500 MG: 500 TABLET, FILM COATED ORAL at 21:07

## 2022-05-03 NOTE — PROGRESS NOTES
Problem: Self Care Deficits Care Plan (Adult)  Goal: *Acute Goals and Plan of Care (Insert Text)  Outcome: Progressing Towards Goal  Note: 1. Patient will perform grooming with supervision. 2. Patient will perform Upper body dressing with supervision  3. Patient will perform lower body dressing with supervision  4. Patient will perform upper and lower body bathing with supervision. 5. Patient will perform toilet transfers with CGA. 6. Patient will perform shower transfer with CGA. 7. Patient will participate in 30 + minutes of ADL/ therapeutic exercise/therapeutic activity with min rest breaks to increase activity tolerance for self care. 8. Patient will perform ADL functional mobility in room with CGA. Goals to be achieved in 7 days. OCCUPATIONAL THERAPY: Initial Assessment, Daily Note, and AM 5/3/2022  INPATIENT:    [place] / [place] / Product Type: Medicare /      NAME/AGE/GENDER: Salmia Yan is a 78 y.o. female   PRIMARY DIAGNOSIS:  Stroke-like episode [R29.90] Stroke-like episode Stroke-like episode        ICD-10: Treatment Diagnosis:    Generalized Muscle Weakness (M62.81)  Other lack of cordination (R27.8)   Precautions/Allergies:     Lisinopril      ASSESSMENT:     Ms. Aimee Sierra admitted with above diagnosis. Pt presents with dementia and decreased self care and functional mobility. Pt would benefit from skilled OT to increase independence. This am, pt ambulated to bathroom with CGA and completed toileting and hygiene with setup. Pt stood at sink and washed hands independently. Pt returned to recliner, needs in reach, posey pad intact.      This section established at most recent assessment   PROBLEM LIST (Impairments causing functional limitations):  Decreased Strength  Decreased ADL/Functional Activities  Decreased Transfer Abilities  Decreased Ambulation Ability/Technique  Decreased Balance  Decreased Activity Tolerance   INTERVENTIONS PLANNED: (Benefits and precautions of occupational therapy have been discussed with the patient.)  Activities of daily living training  Adaptive equipment training  Balance training  Clothing management  Therapeutic activity  Therapeutic exercise     TREATMENT PLAN: Frequency/Duration: Follow patient 3x/week to address above goals. Rehabilitation Potential For Stated Goals: Good     REHAB RECOMMENDATIONS (at time of discharge pending progress):    Placement: It is my opinion, based on this patient's performance to date, that Ms. Tania Engle may benefit from participating in 1-2 additional therapy sessions in order to continue to assess for rehab potential and then make recommendation for disposition at discharge. Equipment:   None at this time              OCCUPATIONAL PROFILE AND HISTORY:   History of Present Injury/Illness (Reason for Referral):  Stroke like symptom  Past Medical History/Comorbidities:   Ms. Tania Engle  has a past medical history of Axonal polyneuropathy (9/15/2020) and Muscle twitching (7/8/2020). Ms. Tania Engle  has a past surgical history that includes hx cholecystectomy (10/03); pr total abdom hysterectomy; and hx orthopaedic (Winter 2013).   Social History/Living Environment:   Home Environment: Private residence  One/Two Story Residence: One story  Living Alone: No  Support Systems: Child(aura)  Patient Expects to be Discharged to[de-identified] Home  Current DME Used/Available at Home: None  Prior Level of Function/Work/Activity:  independent     Number of Personal Factors/Comorbidities that affect the Plan of Care: Brief history (0):  LOW COMPLEXITY   ASSESSMENT OF OCCUPATIONAL PERFORMANCE[de-identified]   Activities of Daily Living:   Basic ADLs (From Assessment) Complex ADLs (From Assessment)   Feeding: Setup  Oral Facial Hygiene/Grooming: Setup  Bathing: Contact guard assistance  Upper Body Dressing: Setup  Lower Body Dressing: Contact guard assistance,Minimum assistance  Toileting: Setup     Grooming/Bathing/Dressing Activities of Daily Living     Cognitive Retraining  Safety/Judgement: Fall prevention                 Functional Transfers  Bathroom Mobility: Contact guard assistance  Toilet Transfer : Contact guard assistance  Shower Transfer: Contact guard assistance     Bed/Mat Mobility  Supine to Sit: Stand-by assistance  Sit to Stand: Contact guard assistance  Stand to Sit: Contact guard assistance  Bed to Chair: Contact guard assistance  Scooting: Stand-by assistance     Most Recent Physical Functioning:   Gross Assessment:  AROM: Generally decreased, functional  Strength: Generally decreased, functional  Coordination: Within functional limits  Tone: Normal  Sensation: Intact               Posture:     Balance:  Sitting: Intact  Standing: With support Bed Mobility:  Supine to Sit: Stand-by assistance  Scooting: Stand-by assistance  Wheelchair Mobility:     Transfers:  Sit to Stand: Contact guard assistance  Stand to Sit: Contact guard assistance  Bed to Chair: Contact guard assistance            Patient Vitals for the past 6 hrs:   BP BP Patient Position SpO2 O2 Flow Rate (L/min) Pulse   22 0700 (!) 141/75 At rest 99 % -- 68   22 0705 -- -- -- 2 l/min --   22 0800 -- -- -- -- 82   22 0834 -- -- 100 % -- --       Mental Status  Neurologic State: Confused  Orientation Level: Disoriented to situation,Disoriented to time,Oriented to person  Cognition: Follows commands  Perception: Verbal  Perseveration: No perseveration noted  Safety/Judgement: Fall prevention                          Physical Skills Involved:  Balance  Strength  Activity Tolerance Cognitive Skills Affected (resulting in the inability to perform in a timely and safe manner):  Perception  Executive Function  Short Term Recall  Long Term Memory  Comprehension Psychosocial Skills Affected:  Habits/Routines  Social Interaction   Number of elements that affect the Plan of Care: 1-3:  LOW COMPLEXITY   CLINICAL DECISION MAKIN [facility] [address] [address] [facility] 8 Clicks   Daily Activity Inpatient Short Form  How much help from another person does the patient currently need. .. Total A Lot A Little None   1. Putting on and taking off regular lower body clothing? [] 1   [] 2   [x] 3   [] 4   2. Bathing (including washing, rinsing, drying)? [] 1   [] 2   [x] 3   [] 4   3. Toileting, which includes using toilet, bedpan or urinal?   [] 1   [] 2   [x] 3   [] 4   4. Putting on and taking off regular upper body clothing? [] 1   [] 2   [] 3   [x] 4   5. Taking care of personal grooming such as brushing teeth? [] 1   [] 2   [] 3   [x] 4   6. Eating meals? [] 1   [] 2   [] 3   [x] 4   © 2007, Trustees of [facility] [employer], under license to [employer]. All rights reserved      Score:  Initial: 21 Most Recent: X (Date: -- )    Interpretation of Tool:  Represents activities that are increasingly more difficult (i.e. Bed mobility, Transfers, Gait). Medical Necessity:     Patient is expected to demonstrate progress in   strength, balance, coordination, and functional technique   to   increase independence with self care and functional mobility  . Reason for Services/Other Comments:  Patient continues to require skilled intervention due to   Decrease self care and functional mobility  . Use of outcome tool(s) and clinical judgement create a POC that gives a: LOW COMPLEXITY         TREATMENT:   (In addition to Assessment/Re-Assessment sessions the following treatments were rendered)     Pre-treatment Symptoms/Complaints:  tolerated sitting in recliner  Pain: Initial:   Pain Intensity 1: 0  Post Session:  0     Self Care: (15): Procedure(s) (per grid) utilized to improve and/or restore self-care/home management as related to toileting, grooming, and functional mobility . Required minimal verbal and   cueing to facilitate activities of daily living skills and compensatory activities.   Assessment    Braces/Orthotics/Lines/Etc:   O2 Device:  (changed to RA)  Treatment/Session Assessment:    Response to Treatment:  tolerated well  Interdisciplinary Collaboration:   Physical Therapist  Occupational Therapist  Registered Nurse  After treatment position/precautions:   Up in chair  Bed alarm/tab alert on  Bed/Chair-wheels locked  Call light within reach  RN notified  LEs reclined    Compliance with Program/Exercises: Compliant all of the time. Recommendations/Intent for next treatment session: \"Next visit will focus on advancements to more challenging activities and reduction in assistance provided\".   Total Treatment Duration:  OT Patient Time In/Time Out  Time In: [facility]  Time Out: [address] [facility], OT

## 2022-05-03 NOTE — ED NOTES
TRANSFER - OUT REPORT:    Verbal report given to Ainsley RN(name) on Salima Yan  being transferred to 354(unit) for routine progression of care       Report consisted of patients Situation, Background, Assessment and   Recommendations(SBAR). Information from the following report(s) SBAR, Kardex, ED Summary, Intake/Output, MAR and Recent Results was reviewed with the receiving nurse. Lines:   Peripheral IV 05/03/22 Anterior; Left Forearm (Active)       Peripheral IV 05/03/22 Left Antecubital (Active)        Opportunity for questions and clarification was provided.       Patient transported with:   Registered Nurse

## 2022-05-03 NOTE — ED TRIAGE NOTES
Pt is from home. Ems state that the pt has dementia and has frequent periods of hyperventilation. Has had another episode and the family was unable to get the pt calmed down. Ems did get the pt calmed down in transport.   Masked on arrival

## 2022-05-03 NOTE — PROGRESS NOTES
SPEECH LANGUAGE PATHOLOGY: DYSPHAGIA  Initial Assessment and Discharge    NAME/AGE/GENDER: Dany Richmond is a 78 y.o. female  DATE: 5/3/2022  PRIMARY DIAGNOSIS: Stroke-like episode [R29.90]      ICD-10: Treatment Diagnosis: R13.12 Dysphagia, Oropharyngeal Phase    RECOMMENDATIONS   DIET:    Regular Consistency   Thin Liquids    MEDICATIONS: as tolerated     ASPIRATION PRECAUTIONS  · Slow rate of intake  · Small bites/sips  · Upright at 90 degrees during meal     COMPENSATORY STRATEGIES/MODIFICATIONS  · None     EDUCATION:  · Recommendations discussed with Nursing  · Family  · Patient     CONTINUATION OF SKILLED SERVICES/MEDICAL NECESSITY:   No dysphagia goals identified as swallow function is within normal limits. RECOMMENDATIONS for CONTINUED SPEECH THERAPY: No further speech therapy indicated at this time. ASSESSMENT   Patient presents with oropharyngeal swallowing abilities that are grossly WNL. Slowed, but functional bolus prep with chewables. No overt s/sx airway compromise with solids or liquids. Recommend regular diet and thin liquids. Meds as tolerated. Per chart review, MRI negative for acute infarction. Daughter at bedside reports patient is at her neurological baseline. REHABILITATION POTENTIAL FOR STATED GOALS: Excellent    PLAN    FREQUENCY/DURATION: No further speech therapy indicated at this time as oropharyngeal swallow function is within normal limits. SUBJECTIVE   Patient alert upright in bed for assessment. Pleasant and agreeable to assessment. Hard of hearing requiring multiple repetitions of directions and questions. Daughter at bedside. History of Present Injury/Illness: Ms. 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 . She also  has a past surgical history that includes hx cholecystectomy (10/03); pr total abdom hysterectomy; and hx orthopaedic (Winter 2013).      Problem List:  (Impairments causing functional limitations):  1. Oropharyngeal dysphagia- No symptoms identified    Previous Dysphagia: NONE REPORTED- last seen by speech therapy 12/21 for bedside swallow evaluation- no oropharyngeal dysphagia symptoms identified at that time. Diet Prior to Evaluation: Regular/thin    Orientation:   Person  Place - knows she's in the hospital, but unable to name hospital.     Cognitive-Linguistic Screen:  Prior Level of Function: Lives at home with daughter  [place] Speech Observations: WNL- clear and 100% intelligible   Language Observations:Limited verbalization, but answering basic questions appropriately. Follows 1 step commands with repetition- patient hard of hearing.  Cognitive-Linguistics Observations:Baseline dementia. Most history provided by daughter at bedside.   - Per chart review, MRI negative for acute infarction. Patient's daughter reports patient is back at her neurological baseline. Pain: Pain Scale 1: Numeric (0 - 10)  Pain Intensity 1: 0    OBJECTIVE   Oral Motor:   · Labial: No impairment  · Dentition: Intact and Natural  · Oral Hygiene: Adequate  · Lingual: No impairment    Swallow evaluation:  Patient presented with thin liquid via cup and straw, mixed, and solid consistencies. Minimal oral delay with coarse fruit, but appropriate oral prep with all textures. Timely swallow initiation, and single swallows upon palpation. Adequate oral clearing. No overt signs or symptoms of airway compromise observed with liquid or solid textures.        INTERDISCIPLINARY COLLABORATION: Registered Nurse  PRECAUTIONS/ALLERGIES: Lisinopril     Tool Used: Dysphagia Outcome and Severity Scale (JULIET)    Score Comments   Normal Diet  [] 7 With no strategies or extra time needed   Functional Swallow  [x] 6 May have mild oral or pharyngeal delay   Mild Dysphagia  [] 5 Which may require one diet consistency restricted    Mild-Moderate Dysphagia  [] 4 With 1-2 diet consistencies restricted   Moderate Dysphagia  [] 3 With 2 or more diet consistencies restricted   Moderate-Severe Dysphagia  [] 2 With partial PO strategies (trials with ST only)   Severe Dysphagia  [] 1 With inability to tolerate any PO safely      Score:  Initial:6 Most Recent: x (Date 05/03/22 )   Interpretation of Tool: The Dysphagia Outcome and Severity Scale (JULIET) is a simple, easy-to-use, 7-point scale developed to systematically rate the functional severity of dysphagia based on objective assessment and make recommendations for diet level, independence level, and type of nutrition. Current Medications:   No current facility-administered medications on file prior to encounter. Current Outpatient Medications on File Prior to Encounter   Medication Sig Dispense Refill    levETIRAcetam (KEPPRA) 250 mg tablet 1 QAM, 2 QHS. 270 Tablet 1    donepeziL (ARICEPT) 10 mg tablet Take 1 tablet by mouth nightly 90 Tablet 0    escitalopram oxalate (LEXAPRO) 10 mg tablet Take 1 Tablet by mouth daily. Indications: anxiousness associated with depression 90 Tablet 4    losartan (COZAAR) 25 mg tablet Take 1 Tablet by mouth daily. 90 Tablet 4    risperiDONE (RisperDAL) 0.25 mg tablet Take 1 Tab by mouth two (2) times a day. 100 Tab 4    memantine (NAMENDA) 10 mg tablet Take 1 Tab by mouth two (2) times a day. (Patient taking differently: Take 10 mg by mouth daily. ) 180 Tab 3       SAFETY:  After treatment position/precautions:  · Upright in bed  · Daughter at bedside  · Call light within reach    Total Treatment Duration:   Time In: 9886  Time Out: [address] [facility], [address] [facility]

## 2022-05-03 NOTE — PROGRESS NOTES
05/03/22 0514   Dual Skin Pressure Injury Assessment   Dual Skin Pressure Injury Assessment WDL   Second Care Provider (Based on Facility Policy) CHELLY Onofre   Skin Integumentary   Skin Integumentary (WDL) WDL    Pressure  Injury Documentation No Pressure Injury Noted-Pressure Ulcer Prevention Initiated   Skin Color Appropriate for ethnicity   Skin Condition/Temp Warm;Dry   Skin Integrity Intact

## 2022-05-03 NOTE — PROGRESS NOTES
Care Management Interventions  PCP Verified by CM: Yes (Dr. Corky Almeida)  Palliative Care Criteria Met (RRAT>21 & CHF Dx)?: No  Mode of Transport at Discharge: Self  Transition of Care Consult (CM Consult): Discharge Planning,Other (resource info provided)  Discharge Durable Medical Equipment: No  Physical Therapy Consult: Yes  Occupational Therapy Consult: Yes  Speech Therapy Consult: Yes  Support Systems: Child(aura),/  Confirm Follow Up Transport: Family  The Plan for Transition of Care is Related to the Following Treatment Goals : discharge home with family  The Patient and/or Patient Representative was Provided with a Choice of Provider and Agrees with the Discharge Plan?: No (n/a)  Freedom of Choice List was Provided with Basic Dialogue that Supports the Patient's Individualized Plan of Care/Goals, Treatment Preferences and Shares the Quality Data Associated with the Providers?:  (n/a)  Discharge Location  Patient Expects to be Discharged to[de-identified] Home with family assistance     CM referral received to assist with discharge planning following evaluation to rule out a stroke. Patient is an 79 y/o female who has a history of alzheimer's dementia. She has lived with her dtr and son in law for the past 3 years. Other family members assist at times and patient goes to adult day care on weekdays. Dtr Nuno Samaniego works full time and is patient's primary caregiver. At baseline, pt is independent with ADL's and ambulation. CM met with dtr who requested assistance in initiating a long term plan for patient's care. Dtr was very tearful and stated that she is under a great deal of stress. Dtr reported that behavior issues are becoming more frequent. CM provided emotional support. Dtr and CM discussed several options including respite care, increased assistance from family members, and possible long term care placement in a memory care facility.  CM also suggested discussing behaviors with patient's neurologist and/or PCP. Dtr reported that she has participated in educational seminars for caregivers and has some knowledge about the [employer]. CM provided information about local resources. CM also provided information about [facility] and explained that this agency can assist with arranging a family meeting and with placement options. Dtr stated that they have applied for medicaid in the past, but were denied due to patients assets (home and insurance policy). Family may need to sell patient's home and use funds from the sale to care for patient. Dtr expressed understanding and agreement. Dtr expressed appreciation for the information. CM encouraged dtr to apply for medicaid and if denied, inquire about the correct way to utilize patient's resources for a spend down. Pt to discharge later today or tomorrow. Dtr to look into support services and speak with family about a long term plan.

## 2022-05-03 NOTE — PROGRESS NOTES
Problem: Patient Education: Go to Patient Education Activity  Goal: Patient/Family Education  Outcome: Progressing Towards Goal     Problem: TIA/CVA Stroke: 0-24 hours  Goal: Off Pathway (Use only if patient is Off Pathway)  Outcome: Progressing Towards Goal  Goal: Activity/Safety  Outcome: Progressing Towards Goal  Goal: Consults, if ordered  Outcome: Progressing Towards Goal  Goal: Diagnostic Test/Procedures  Outcome: Progressing Towards Goal  Goal: Nutrition/Diet  Outcome: Progressing Towards Goal  Goal: Discharge Planning  Outcome: Progressing Towards Goal  Goal: Medications  Outcome: Progressing Towards Goal  Goal: Respiratory  Outcome: Progressing Towards Goal  Goal: Treatments/Interventions/Procedures  Outcome: Progressing Towards Goal  Goal: Minimize risk of bleeding post-thrombolytic infusion  Outcome: Progressing Towards Goal  Goal: Monitor for complications post-thrombolytic infusion  Outcome: Progressing Towards Goal  Goal: Psychosocial  Outcome: Progressing Towards Goal  Goal: *Hemodynamically stable  Outcome: Progressing Towards Goal  Goal: *Neurologically stable  Description: Absence of additional neurological deficits    Outcome: Progressing Towards Goal  Goal: *Verbalizes anxiety and depression are reduced or absent  Outcome: Progressing Towards Goal  Goal: *Absence of Signs of Aspiration on Current Diet  Outcome: Progressing Towards Goal  Goal: *Absence of deep venous thrombosis signs and symptoms(Stroke Metric)  Outcome: Progressing Towards Goal  Goal: *Ability to perform ADLs and demonstrates progressive mobility and function  Outcome: Progressing Towards Goal  Goal: *Stroke education started(Stroke Metric)  Outcome: Progressing Towards Goal  Goal: *Dysphagia screen performed(Stroke Metric)  Outcome: Progressing Towards Goal  Goal: *Rehab consulted(Stroke Metric)  Outcome: Progressing Towards Goal     Problem: Falls - Risk of  Goal: *Absence of Falls  Description: Document Rebeca Fall Risk and appropriate interventions in the flowsheet.   Outcome: Progressing Towards Goal  Note: Fall Risk Interventions:       Mentation Interventions: Bed/chair exit alarm,Adequate sleep, hydration, pain control,Reorient patient,More frequent rounding,Room close to nurse's station    Medication Interventions: Patient to call before getting OOB,Teach patient to arise slowly,Bed/chair exit alarm                   Problem: Patient Education: Go to Patient Education Activity  Goal: Patient/Family Education  Outcome: Progressing Towards Goal

## 2022-05-03 NOTE — PROGRESS NOTES
Problem: Mobility Impaired (Adult and Pediatric)  Goal: *Acute Goals and Plan of Care (Insert Text)  Outcome: Progressing Towards Goal  Note: STG:  (1.)Ms. Tracy Parada will move from supine to sit and sit to supine  with SUPERVISION within 4-7 treatment day(s). (2.)Ms. Tracy Parada will transfer from bed to chair and chair to bed with STAND BY ASSIST using the least restrictive device within 4-7 treatment day(s). (3.)Ms. Tracy Parada will ambulate with STAND BY ASSIST for 300 feet with the least restrictive device within 4-7 treatment day(s). (4.)Ms. Tracy Parada will go up and down 3 steps with rail and CGA within 4-7 treatment days. ________________________________________________________________________________________________       PHYSICAL THERAPY: Initial Assessment and AM 5/3/2022  INPATIENT: PT Visit Days : 1  [place] / [place] / Product Type: Medicare /       NAME/AGE/GENDER: Kelli Collier is a 78 y.o. female   PRIMARY DIAGNOSIS: Stroke-like episode [R29.90] Stroke-like episode Stroke-like episode        ICD-10: Treatment Diagnosis:    Generalized Muscle Weakness (M62.81)  Difficulty in walking, Not elsewhere classified (R26.2)   Precaution/Allergies:  Lisinopril      ASSESSMENT:     Ms. Tracy Parada presents with generalized weakness and decreased independence with functional mobility and will benefit from skilled PT interventions to maximize independence with functional mobility. Per chart family stating pt having difficulty walking and leg weakness. Per chart pt has dementia. Pt walked in room and in and out of bathroom. She is slightly unsteady. Worked today on walking in [place] with verbal cues. Pt a little unsteady in [place] and reporting some right leg pain but when asked specific questions about her pain she could not answer. Pt got a little better walking the further into the walk we got. Unsure of her baseline mobility and pt not able to articulate, no family in room.  Could be that pt is getting close to her baseline. Hope to progress her mobility at next therapy session. This section established at most recent assessment   PROBLEM LIST (Impairments causing functional limitations):  Decreased Strength  Decreased ADL/Functional Activities  Decreased Transfer Abilities  Decreased Ambulation Ability/Technique  Decreased Balance  Decreased Activity Tolerance   INTERVENTIONS PLANNED: (Benefits and precautions of physical therapy have been discussed with the patient.)  Balance Exercise  Bed Mobility  Gait Training  Therapeutic Activites  Therapeutic Exercise/Strengthening  Transfer Training     TREATMENT PLAN: Frequency/Duration: daily for duration of hospital stay  Rehabilitation Potential For Stated Goals: Good     REHAB RECOMMENDATIONS (at time of discharge pending progress):    Placement: It is my opinion, based on this patient's performance to date, that Ms. Tomy Holter may benefit from [address] after discharge due to the functional deficits listed above that are likely to improve with skilled rehabilitation because he/she has multiple medical issues that affect his/her functional mobility in the community. Equipment: To be determined              HISTORY:   History of Present Injury/Illness (Reason for Referral):  Copied from MD note: Abelardo Weiner is a 78 y.o. female with medical history of hypertension, Alzheimer's, and unclear seizure disorder, who presented with \"hyperventilation\". EMS reports that they were called due to hyperventilating. Family reports that she does this from time to time, but tonight they could not get her calm down. Then they noted that the left side of her face seemed to not look right and they were having hard time helping her walk. She just seemed to be weaker than normal.  They could not tell that it was 1 side or the other at that time. They report that she went to bed around 7 PM and she was normal at that time.   It was about 930 when she woke up with the hyperventilating. While she was still at home, her speech was also slurred and family could not understand what she was saying. A code stroke was called upon the patient's arrival.  Initial evaluation with CT was unremarkable. Some of her symptoms, particularly her speech, had already improved at time of arrival.  Initial evaluation in the ER showed her to still have a left facial droop and mild left-sided weakness. Her speech was better and essentially back to baseline. She was evaluated by the [facility] neurologist.  She was out of the window for tPA. They recommended admission for stroke evaluation. And also notes that this may have been a partial seizure and recommends increasing her Keppra dose to 500 twice daily. Due to her dementia, she does not answer review of system questions well but denies fever/chills, NVD, abdominal pain, chest pain, shortness of breath. Her daughter is here with her and feels that she is at her usual baseline. Past Medical History/Comorbidities:   Ms. Sanaz Ruiz  has a past medical history of Axonal polyneuropathy (9/15/2020) and Muscle twitching (7/8/2020). Ms. Sanaz Ruiz  has a past surgical history that includes hx cholecystectomy (10/03); pr total abdom hysterectomy; and hx orthopaedic (Winter 2013).   Social History/Living Environment:   Home Environment: Private residence  One/Two Story Residence: One story  Living Alone: No  Support Systems: Child(aura),/  Patient Expects to be Discharged to[de-identified] Home with family assistance  Current DME Used/Available at Home: None  Prior Level of Function/Work/Activity:  Pt living at home, unsure of details due to her dementia     Number of Personal Factors/Comorbidities that affect the Plan of Care: 1-2: MODERATE COMPLEXITY   EXAMINATION:   Most Recent Physical Functioning:   Gross Assessment:  AROM: Generally decreased, functional  Strength: Generally decreased, functional Posture:  Posture (WDL): Exceptions to WDL  Posture Assessment: Forward head,Rounded shoulders  Balance:  Sitting: Intact  Standing: Impaired  Standing - Static: Good  Standing - Dynamic : Good;Fair Bed Mobility:  Supine to Sit: Stand-by assistance  Wheelchair Mobility:     Transfers:  Sit to Stand: Contact guard assistance  Stand to Sit: Contact guard assistance  Gait:     Speed/Suzie: Pace decreased (<100 feet/min)  Step Length: Left shortened;Right shortened  Gait Abnormalities: Decreased step clearance;Shuffling gait  Distance (ft): 200 Feet (ft)  Ambulation - Level of Assistance: Stand-by assistance;Contact guard assistance  Home Environment: Private residence  Home Situation  Home Environment: Private residence  One/Two Story Residence: One story  Living Alone: No  Support Systems: Child(aura)  Patient Expects to be Discharged to[de-identified] Home with home health      Body Structures Involved:  Muscles Body Functions Affected: Movement Related Activities and Participation Affected: Mobility  Self Care   Number of elements that affect the Plan of Care: 4+: HIGH COMPLEXITY   CLINICAL PRESENTATION:   Presentation: Stable and uncomplicated: LOW COMPLEXITY   CLINICAL DECISION MAKIN[address] [place] [address] [address] [id number] AM-PAC 6 Clicks   Basic Mobility Inpatient Short Form  How much difficulty does the patient currently have. .. Unable A Lot A Little None   1. Turning over in bed (including adjusting bedclothes, sheets and blankets)? [] 1   [] 2   [] 3   [x] 4   2. Sitting down on and standing up from a chair with arms ( e.g., wheelchair, bedside commode, etc.)   [] 1   [] 2   [x] 3   [] 4   3. Moving from lying on back to sitting on the side of the bed? [] 1   [] 2   [] 3   [x] 4   How much help from another person does the patient currently need. .. Total A Lot A Little None   4. Moving to and from a bed to a chair (including a wheelchair)? [] 1   [] 2   [x] 3   [] 4   5. Need to walk in hospital room?    [] 1   [] 2   [x] 3   [] 4   6. Climbing 3-5 steps with a railing? [] 1   [] 2   [x] 3   [] 4   © 2007, Trustees of [facility] [employer], under license to [employer]. All rights reserved      Score:  Initial: 20 Most Recent: X (Date: -- )    Interpretation of Tool:  Represents activities that are increasingly more difficult (i.e. Bed mobility, Transfers, Gait). Medical Necessity:     Patient is expected to demonstrate progress in   strength and functional technique   to   decrease assistance required with functional mobility   . Reason for Services/Other Comments:  Patient continues to require skilled intervention due to   Inability to complete functional mobility independently  . Use of outcome tool(s) and clinical judgement create a POC that gives a: Clear prediction of patient's progress: LOW COMPLEXITY            TREATMENT:   (In addition to Assessment/Re-Assessment sessions the following treatments were rendered)   Pre-treatment Symptoms/Complaints:  none  Pain: Initial:   Pain Intensity 1: 0  Post Session:  0/10     Therapeutic Activity: (    8):  Therapeutic activities including Bed transfers, Chair transfers, and Ambulation on level ground to improve mobility and strength. Required minimal verbal cues  to promote static and dynamic balance in standing. Assessment    Braces/Orthotics/Lines/Etc:   O2 Device:  (changed to RA)  Treatment/Session Assessment:    Response to Treatment:  pt did well moving with therapy  Interdisciplinary Collaboration:   Occupational Therapist  Registered Nurse  After treatment position/precautions:   Up in chair  Bed alarm/tab alert on  Bed/Chair-wheels locked  Bed in low position  Call light within reach  RN notified   Compliance with Program/Exercises: Will assess as treatment progresses  Recommendations/Intent for next treatment session: \"Next visit will focus on advancements to more challenging activities and reduction in assistance provided\".   Total Treatment Duration:  PT Patient Time In/Time Out  Time In: 0922  Time Out: [address], PT

## 2022-05-03 NOTE — H&P
Hospitalist History and Physical   Admit Date:  2022 11:26 PM   Name:  Stephanie Rich   Age:  78 y.o. Sex:  female  :  1940   MRN:  [id number]   Room:  01/    Presenting Complaint: Other    Reason(s) for Admission: Stroke-like episode [R29.90]     History of Present Illness: This patient was discussed with the ER provider prior to seeing the patient. Pertinent history, physical, diagnostics,   initial treatments, and further plans reviewed. Stephanie Rich is a 78 y.o. female with medical history of hypertension, Alzheimer's, and unclear seizure disorder, who presented with \"hyperventilation\". EMS reports that they were called due to hyperventilating. Family reports that she does this from time to time, but tonight they could not get her calm down. Then they noted that the left side of her face seemed to not look right and they were having hard time helping her walk. She just seemed to be weaker than normal.  They could not tell that it was 1 side or the other at that time. They report that she went to bed around 7 PM and she was normal at that time. It was about 930 when she woke up with the hyperventilating. While she was still at home, her speech was also slurred and family could not understand what she was saying. A code stroke was called upon the patient's arrival.  Initial evaluation with CT was unremarkable. Some of her symptoms, particularly her speech, had already improved at time of arrival.  Initial evaluation in the ER showed her to still have a left facial droop and mild left-sided weakness. Her speech was better and essentially back to baseline. She was evaluated by the [facility] neurologist.  She was out of the window for tPA. They recommended admission for stroke evaluation. And also notes that this may have been a partial seizure and recommends increasing her Keppra dose to 500 twice daily.   Due to her dementia, she does not answer review of system questions well but denies fever/chills, NVD, abdominal pain, chest pain, shortness of breath. Her daughter is here with her and feels that she is at her usual baseline. Review of Systems:  10 systems reviewed and negative except as noted in HPI.     Assessment & Plan:     Principal Problem:    Stroke-like episode (5/3/2022)    Stroke protocol and order set with: MRI, ECHO, CVA labs, PT/OT/ST, aspirin, statin  Consults to Neurology, IP rehab, Stroke protocol RN    Active Problems:    Essential hypertension (11/6/2015)  Continue losartan      Late onset Alzheimer's disease with behavioral disturbance ([address] (6/3/2019)  Continue Risperdal, Aricept and Namenda      Nonintractable epilepsy with complex partial seizures ([address] (2/3/2022)  Increase Keppra to 500 mg twice daily        Dispo/Discharge Planning:   Inpatient, anticipate 2 midnights    Diet: Regular  VTE ppx: Low-dose Lovenox  Code status: Full    Hospital Problems as of 5/3/2022 Date Reviewed: 2/3/2022          Codes Class Noted - Resolved POA    * (Principal) Stroke-like episode ICD-10-CM: R29.90  ICD-9-CM: 781.99  5/3/2022 - Present Yes        Nonintractable epilepsy with complex partial seizures ([address] (Chronic) ICD-10-CM: G40.209  ICD-9-CM: 345.40  2/3/2022 - Present Yes        Dementia ([facility] (Chronic) ICD-10-CM: F03.90  ICD-9-CM: 294.20  12/27/2021 - Present Unknown        Late onset Alzheimer's disease with behavioral disturbance ([address] (Chronic) ICD-10-CM: G30.1, F02.81  ICD-9-CM: 331.0, 294.11  6/3/2019 - Present Yes        Essential hypertension (Chronic) ICD-10-CM: I10  ICD-9-CM: 401.9  11/6/2015 - Present Yes              Past History:  Past Medical History:   Diagnosis Date    Axonal polyneuropathy 9/15/2020    Muscle twitching 7/8/2020     Past Surgical History:   Procedure Laterality Date    HX CHOLECYSTECTOMY  10/03    HX ORTHOPAEDIC  Winter 2013    foot surgery-right: Dr. Emma Banuelos surgery     [place] TOTAL ABDOM HYSTERECTOMY      Ooph Allergies   Allergen Reactions    Lisinopril Cough      Social History     Tobacco Use    Smoking status: Never Smoker    Smokeless tobacco: Never Used   Substance Use Topics    Alcohol use: No        Family Hx:  Family History   Problem Relation Age of Onset    Coronary Art Dis Paternal Grandmother [de-identified]        ?  Other Father         Colorectal Malignancy    Alzheimer's Disease Mother     Hypertension Mother     Other Sister         ALS    No Known Problems Brother     No Known Problems Sister     No Known Problems Sister     No Known Problems Brother     Breast Cancer Neg Hx        Family history reviewed and negative except as otherwise noted. Immunization History   Administered Date(s) Administered    COVID-19, Moderna, Primary or Immunocompromised Series, MRNA, PF, 100mcg/0.5mL 03/19/2021, 04/16/2021    Influenza High Dose Vaccine PF 10/09/2017, 10/12/2018, 01/15/2019, 11/06/2019    Influenza Vaccine 01/01/2015    Influenza, Quadrivalent, Adjuvanted (>65 Yrs FLUAD QUAD [id number]) 10/02/2020, 09/28/2021    Pneumococcal Conjugate (PCV-13) 11/06/2015, 11/06/2019    Pneumococcal Polysaccharide (PPSV-23) 01/01/2006     Prior to Admit Medications:  Current Outpatient Medications   Medication Instructions    donepeziL (ARICEPT) 10 mg, Oral, EVERY BEDTIME    escitalopram oxalate (LEXAPRO) 10 mg, Oral, DAILY    levETIRAcetam (KEPPRA) 250 mg tablet 1 QAM, 2 QHS.     losartan (COZAAR) 25 mg, Oral, DAILY    memantine (NAMENDA) 10 mg, Oral, 2 TIMES DAILY    risperiDONE (RISPERDAL) 0.25 mg, Oral, 2 TIMES DAILY       Objective:     Patient Vitals for the past 24 hrs:   Temp Pulse Resp BP SpO2   05/03/22 0353     99 %   05/03/22 0228    (!) 180/118    05/03/22 0158  69  (!) 186/84    05/03/22 0128  66  (!) 189/88    05/03/22 0028  66  (!) 179/82    05/03/22 0025 98.3 °F (36.8 °C)       05/02/22 2328 97.4 °F (36.3 °C) 70 28 135/79 99 %     Oxygen Therapy  O2 Sat (%): 99 % (05/03/22 6865)  Pulse via Oximetry: 74 beats per minute (05/02/22 7653)  O2 Device: Nasal cannula (05/03/22 0353)  O2 Flow Rate (L/min): 2 l/min (05/03/22 0353)    Estimated body mass index is 25.97 kg/m² as calculated from the following:    Height as of this encounter: 5' 2\" (1.575 m). Weight as of this encounter: 64.4 kg (141 lb 15.6 oz). No intake or output data in the 24 hours ending 05/03/22 0445      Physical Exam:    Blood pressure (!) 180/118, pulse 69, temperature 98.3 °F (36.8 °C), resp. rate 28, height 5' 2\" (1.575 m), weight 64.4 kg (141 lb 15.6 oz), SpO2 99 %. General:    Well nourished. No apparent distress  HENT:  Normocephalic, atraumatic. Nares appear normal, no drainage. Oropharynx is clear with tacky mucous membranes  Eyes:  Sclerae appear normal.  PERRL; EOMI  Neck:  No restricted ROM. Trachea midline   CV:   RRR. No jugular venous distension. No LE edema  Lungs:   CTAB. No wheezing, rhonchi, or rales. Respirations even, unlabored  Abdomen: Bowel sounds present. Soft, nontender, nondistended. Musc/Sk: No cyanosis or clubbing. Skin:     normal coloration. Warm and dry. Neuro:  CN II-XII grossly intact.   Eye exam as noted above; Moves extremities equally and appropriately;   Psych:   Alert and oriented x 2; Judgement and insight are impaired    I have reviewed ordered lab tests and reports of imaging below:    Last 24hr Labs:  Recent Results (from the past 24 hour(s))   GLUCOSE, POC    Collection Time: 05/03/22 12:30 AM   Result Value Ref Range    Glucose (POC) 94 65 - 100 mg/dL    Performed by [facility]    POC PT/INR    Collection Time: 05/03/22 12:40 AM   Result Value Ref Range    Prothrombin time (POC) 11.3 9.6 - 11.6 SECS    INR (POC) 0.9 0.9 - 1.2     URINALYSIS W/ RFLX MICROSCOPIC    Collection Time: 05/03/22 12:46 AM   Result Value Ref Range    Color YELLOW      Appearance CLEAR      Specific gravity 1.007 1.001 - 1.023      pH (UA) 8.5 5.0 - 9.0      Protein Negative NEG mg/dL    Glucose Negative mg/dL    Ketone Negative NEG mg/dL    Bilirubin Negative NEG      Blood Negative NEG      Urobilinogen 0.2 0.2 - 1.0 EU/dL    Nitrites Negative NEG      Leukocyte Esterase TRACE (A) NEG      WBC 0-3 0 /hpf    RBC 0 0 /hpf    Epithelial cells 0 0 /hpf    Bacteria 0 0 /hpf    Casts 0 0 /lpf   CBC WITH AUTOMATED DIFF    Collection Time: 05/03/22 12:49 AM   Result Value Ref Range    WBC 5.1 4.3 - 11.1 K/uL    RBC 4.22 4.05 - 5.2 M/uL    HGB 12.9 11.7 - 15.4 g/dL    HCT 39.1 35.8 - 46.3 %    MCV 92.7 79.6 - 97.8 FL    MCH 30.6 26.1 - 32.9 PG    MCHC 33.0 31.4 - 35.0 g/dL    RDW 12.9 11.9 - 14.6 %    PLATELET 472 488 - 539 K/uL    MPV 9.3 (L) 9.4 - 12.3 FL    ABSOLUTE NRBC 0.00 0.0 - 0.2 K/uL    DF AUTOMATED      NEUTROPHILS 47 43 - 78 %    LYMPHOCYTES 40 13 - 44 %    MONOCYTES 10 4.0 - 12.0 %    EOSINOPHILS 3 0.5 - 7.8 %    BASOPHILS 1 0.0 - 2.0 %    IMMATURE GRANULOCYTES 0 0.0 - 5.0 %    ABS. NEUTROPHILS 2.4 1.7 - 8.2 K/UL    ABS. LYMPHOCYTES 2.0 0.5 - 4.6 K/UL    ABS. MONOCYTES 0.5 0.1 - 1.3 K/UL    ABS. EOSINOPHILS 0.1 0.0 - 0.8 K/UL    ABS. BASOPHILS 0.0 0.0 - 0.2 K/UL    ABS. IMM. GRANS. 0.0 0.0 - 0.5 K/UL   PROTHROMBIN TIME + INR    Collection Time: 05/03/22 12:49 AM   Result Value Ref Range    Prothrombin time 13.6 12.6 - 14.5 sec    INR 1.0     METABOLIC PANEL, COMPREHENSIVE    Collection Time: 05/03/22 12:49 AM   Result Value Ref Range    Sodium 138 136 - 145 mmol/L    Potassium 4.1 3.5 - 5.1 mmol/L    Chloride 103 98 - 107 mmol/L    CO2 28 21 - 32 mmol/L    Anion gap 7 7 - 16 mmol/L    Glucose 100 65 - 100 mg/dL    BUN 18 8 - 23 MG/DL    Creatinine 1.15 (H) 0.6 - 1.0 MG/DL    GFR est AA 58 (L) >60 ml/min/1.73m2    GFR est non-AA 48 (L) >60 ml/min/1.73m2    Calcium 9.2 8.3 - 10.4 MG/DL    Bilirubin, total 0.6 0.2 - 1.1 MG/DL    ALT (SGPT) 17 12 - 65 U/L    AST (SGOT) 15 15 - 37 U/L    Alk.  phosphatase 77 50 - 136 U/L    Protein, total 7.6 6.3 - 8.2 g/dL    Albumin 3.2 3.2 - 4.6 g/dL Globulin 4.4 (H) 2.3 - 3.5 g/dL    A-G Ratio 0.7 (L) 1.2 - 3.5     MAGNESIUM    Collection Time: 05/03/22 12:49 AM   Result Value Ref Range    Magnesium 2.2 1.8 - 2.4 mg/dL   LIPASE    Collection Time: 05/03/22 12:49 AM   Result Value Ref Range    Lipase 237 73 - 393 U/L   TROPONIN-HIGH SENSITIVITY    Collection Time: 05/03/22 12:49 AM   Result Value Ref Range    Troponin-High Sensitivity 15.0 (H) 0 - 14 pg/mL   NT-PRO BNP    Collection Time: 05/03/22 12:49 AM   Result Value Ref Range    NT pro- <450 PG/ML   TROPONIN-HIGH SENSITIVITY    Collection Time: 05/03/22  2:03 AM   Result Value Ref Range    Troponin-High Sensitivity 15.5 (H) 0 - 14 pg/mL       All Micro Results     None          Other Studies:  CT PERF W CBF    Result Date: 5/3/2022  CT Perfusion Imaging INDICATION:  Neuro deficit. Evaluate for large vessel occlusion. COMPARISON: December 26, 2021. CT perfusion imaging of the brain was performed after the administration of intravenous contrast.  Perfusion maps and perfusion analysis output were generated using the Vis ai perfusion processing software algorithm. Radiation dose reduction techniques were used for this study: All CT scans performed at this facility use one or all of the following: Automated exposure control, adjustment of the mA and/or kVp according to patient's size, iterative reconstruction. FINDINGS: Vis Pointstic Output Values: CBF < 30% volume:  0 ml   (core infarction volume greater than 50 cc associated with poor outcomes) Tmax > 6 seconds: 0 ml Tmax/CBF Mismatch Volume: 0 ml Tmax/CBF Mismatch Ratio: N/A Hypoperfusion Intensity Ratio: 0   (values greater than 0.5 associated with poor outcome) Tmax > 10 seconds Volume: 0 ml   (volume greater than 100 mL is associated with poor outcome)     No evidence of core infarct or ischemic penumbra. CT CODE NEURO HEAD WO CONTRAST    Result Date: 5/3/2022  Clinical history: Acute neuro changes. Dementia.  Frequent episodes of hyperventilation TECHNIQUE: Axial, coronal and sagittal CT of the brain without IV contrast. Radiation dose reduction techniques were used for this study:  Our CT scanners use one or all of the following: Automated exposure control, adjustment of the mA and/or kVp according to patient's size, iterative reconstruction. COMPARISON: December 2021. FINDINGS: There is no acute intracranial hemorrhage or CT evidence of acute territorial infarction. There is no mass effect, midline shift or hydrocephalus. No extra-axial fluid collection. Cerebellum on the brainstem are grossly unremarkable. Periventricular hypodensity, nonspecific and likely due to chronic small vessel changes. Included globes appear intact. Partially visualized paranasal sinuses and the mastoid air cells are aerated. There is no skull fracture. 1. No acute intracranial hemorrhage or CT evidence of acute territorial infarction. Note that MRI is more sensitive for detection of acute/subacute infarct.       Medications Administered     iopamidoL (ISOVUE-370) 76 % injection 100 mL     Admin Date  05/03/2022 Action  Given Dose  100 mL Route  IntraVENous Administered By  Komal Jade, RT, R, CT          levETIRAcetam (KEPPRA) 500 mg in 0.9% sodium chloride 100 mL IVPB     Admin Date  05/03/2022 Action  New Bag Dose  500 mg Rate  400 mL/hr Route  IntraVENous Administered By  Shirley Torres RN          saline peripheral flush soln 10 mL     Admin Date  05/03/2022 Action  Given Dose  10 mL Route  InterCATHeter Administered By  Komal Jade, RT, R, CT          sodium chloride 0.9 % bolus infusion 100 mL     Admin Date  05/03/2022 Action  New Bag Dose  100 mL Route  IntraVENous Administered By  Komal Jade, RT, R, CT          sodium chloride 0.9 % bolus infusion 500 mL     Admin Date  05/03/2022 Action  New Bag Dose  500 mL Route  IntraVENous Administered By  Shirley Torres RN                Signed:  Ritika Robles MD    Part of this note may have been written by using a voice dictation software. The note has been proof read but may still contain some grammatical/other typographical errors.

## 2022-05-03 NOTE — QM NOTE
TRANSFER - IN REPORT:    Verbal report received from MaliRN(name) on Kassandra Stager  being received from ER(unit) for routine progression of care      Report consisted of patients Situation, Background, Assessment and   Recommendations(SBAR). Information from the following report(s) SBAR and ED Summary was reviewed with the receiving nurse. Opportunity for questions and clarification was provided. Assessment completed upon patients arrival to unit and care assumed.

## 2022-05-03 NOTE — ED PROVIDER NOTES
[address] [facility] Giovanna Walter is a 78 y.o. female seen on 5/3/2022 in the [facility] EMERGENCY DEPT in room ER01/01. Chief Complaint   Patient presents with    Other     HPI and review of systems slightly limited secondary to patient's Alzheimer's dementia. HPI: 43-year-old -American female with history of Alzheimer's dementia and grade 2 diastolic dysfunction presented to the emergency department with complaints of hyperventilating. Per EMS, patient has done this before and they were able to get her to calm down in the ambulance. Per family, patient went to bed normal around 7:00 tonight and about 930 she woke up with her symptoms of hyperventilating. Patient's family stated that they noticed that the left side of her face did not look right and they were having a hard time helping her walk because she seemed to be weak. They did not notice a specific extremity or side that was weaker than the other side but they were concerned about possibly patient having a stroke. When patient symptoms started she was having a very difficult time talking to the family and family states that they could not understand what she was saying. Patient's speech has significantly improved however patient does still have decree sensation in her left upper and lower extremity as well as left facial droop and mild left-sided weakness. This was not mentioned in by EMS. Code stroke initiated upon evaluation of patient. Patient denies any pain or any other symptoms at this time. Historian: Patient/family    REVIEW OF SYSTEMS     Review of Systems   Constitutional: Negative. HENT: Negative. Respiratory: Positive for shortness of breath. Cardiovascular: Negative. Gastrointestinal: Negative. Genitourinary: Negative. Musculoskeletal: Positive for gait problem. Skin: Negative. Neurological: Positive for facial asymmetry.    Psychiatric/Behavioral: Positive for agitation. The patient is nervous/anxious. All other systems reviewed and are negative. PAST MEDICAL HISTORY     Past Medical History:   Diagnosis Date    Axonal polyneuropathy 9/15/2020    Muscle twitching 2020     Past Surgical History:   Procedure Laterality Date    HX CHOLECYSTECTOMY  10/03    HX ORTHOPAEDIC  Winter 2013    foot surgery-right: Dr. Saldivar Scarce surgery     MA TOTAL ABDOM HYSTERECTOMY      Ooph     Social History     Socioeconomic History    Marital status:    Tobacco Use    Smoking status: Never Smoker    Smokeless tobacco: Never Used   Substance and Sexual Activity    Alcohol use: No    Drug use: No   Other Topics Concern    Exercise Yes     Comment: but helps watch her 11 month old great grandchild 2 days a week   Social History Narrative    , her daughter and granddaughter live in town. Also her elderly aunt calls her for help     Prior to Admission Medications   Prescriptions Last Dose Informant Patient Reported? Taking?   donepeziL (ARICEPT) 10 mg tablet   No No   Sig: Take 1 tablet by mouth nightly   escitalopram oxalate (LEXAPRO) 10 mg tablet   No No   Sig: Take 1 Tablet by mouth daily. Indications: anxiousness associated with depression   levETIRAcetam (KEPPRA) 250 mg tablet   No No   Si QAM, 2 QHS. losartan (COZAAR) 25 mg tablet   No No   Sig: Take 1 Tablet by mouth daily. memantine (NAMENDA) 10 mg tablet   No No   Sig: Take 1 Tab by mouth two (2) times a day. Patient taking differently: Take 10 mg by mouth daily. risperiDONE (RisperDAL) 0.25 mg tablet   No No   Sig: Take 1 Tab by mouth two (2) times a day. Facility-Administered Medications: None     Allergies   Allergen Reactions    Lisinopril Cough        PHYSICAL EXAM       Vitals:    22 2328 22 0025   BP: 135/79    Pulse: 70    Resp: 28    Temp: 97.4 °F (36.3 °C) 98.3 °F (36.8 °C)   SpO2: 99%     Vital signs were reviewed.      Physical Exam  Vitals and nursing note reviewed. Constitutional:       General: She is not in acute distress. Appearance: She is not ill-appearing or toxic-appearing. HENT:      Head: Atraumatic. Mouth/Throat:      Mouth: Mucous membranes are moist.   Eyes:      Extraocular Movements: Extraocular movements intact. Pupils: Pupils are equal, round, and reactive to light. Cardiovascular:      Rate and Rhythm: Normal rate and regular rhythm. Pulses: Normal pulses. Heart sounds: Normal heart sounds. Pulmonary:      Effort: Pulmonary effort is normal.      Breath sounds: Normal breath sounds. Abdominal:      Palpations: Abdomen is soft. Tenderness: There is no abdominal tenderness. There is no guarding or rebound. Musculoskeletal:      Right lower leg: No edema. Left lower leg: No edema. Skin:     General: Skin is warm and dry. Neurological:      Mental Status: She is alert. Sensory: Sensory deficit (Sensation decreased to the left upper and lower extremity) present. Motor: Weakness (Slight left upper and lower extremity drift) present.       Comments: Oriented to person, left facial droop   Psychiatric:         Mood and Affect: Mood normal.         Behavior: Behavior normal.          MEDICAL DECISION MAKING     ED Course:    Orders Placed This Encounter    CT CODE NEURO HEAD WO CONTRAST    CTA CODE NEURO HEAD AND NECK W CONT    CT Perfusion w/ Cerebral Blood Flow    CBC WITH AUTOMATED DIFF    PROTHROMBIN TIME + INR    METABOLIC PANEL, COMPREHENSIVE    MAGNESIUM    LIPASE    URINALYSIS W/ RFLX MICROSCOPIC    Troponin High Sensitivity    Repeat Troponin at 2 hours    NT-PRO BNP    NT-PRO BNP    DIET NPO    NURSING-MISCELLANEOUS: Activate CODE STROKE ONE TIME    Perform NIH Stroke Scale    Nursing Dysphagia Screen (STAND)    POC GLUCOSE    NEURO CHECKS q1h x 4 hours    Consult Neurology    GLUCOSE, POC    POC PT/INR    EKG, 12 LEAD, INITIAL    SALINE LOCK IV ONE TIME STAT    sodium chloride 0.9 % bolus infusion 500 mL    sodium chloride 0.9 % bolus infusion 100 mL    iopamidoL (ISOVUE-370) 76 % injection 100 mL    saline peripheral flush soln 10 mL    levETIRAcetam (KEPPRA) 500 mg in 0.9% sodium chloride 100 mL IVPB    Consult  TELE-NEUROLOGY     Recent Results (from the past 8 hour(s))   GLUCOSE, POC    Collection Time: 05/03/22 12:30 AM   Result Value Ref Range    Glucose (POC) 94 65 - 100 mg/dL    Performed by [facility]    POC PT/INR    Collection Time: 05/03/22 12:40 AM   Result Value Ref Range    Prothrombin time (POC) 11.3 9.6 - 11.6 SECS    INR (POC) 0.9 0.9 - 1.2     URINALYSIS W/ RFLX MICROSCOPIC    Collection Time: 05/03/22 12:46 AM   Result Value Ref Range    Color YELLOW      Appearance CLEAR      Specific gravity 1.007 1.001 - 1.023      pH (UA) 8.5 5.0 - 9.0      Protein Negative NEG mg/dL    Glucose Negative mg/dL    Ketone Negative NEG mg/dL    Bilirubin Negative NEG      Blood Negative NEG      Urobilinogen 0.2 0.2 - 1.0 EU/dL    Nitrites Negative NEG      Leukocyte Esterase TRACE (A) NEG      WBC 0-3 0 /hpf    RBC 0 0 /hpf    Epithelial cells 0 0 /hpf    Bacteria 0 0 /hpf    Casts 0 0 /lpf   CBC WITH AUTOMATED DIFF    Collection Time: 05/03/22 12:49 AM   Result Value Ref Range    WBC 5.1 4.3 - 11.1 K/uL    RBC 4.22 4.05 - 5.2 M/uL    HGB 12.9 11.7 - 15.4 g/dL    HCT 39.1 35.8 - 46.3 %    MCV 92.7 79.6 - 97.8 FL    MCH 30.6 26.1 - 32.9 PG    MCHC 33.0 31.4 - 35.0 g/dL    RDW 12.9 11.9 - 14.6 %    PLATELET 656 591 - 405 K/uL    MPV 9.3 (L) 9.4 - 12.3 FL    ABSOLUTE NRBC 0.00 0.0 - 0.2 K/uL    DF AUTOMATED      NEUTROPHILS 47 43 - 78 %    LYMPHOCYTES 40 13 - 44 %    MONOCYTES 10 4.0 - 12.0 %    EOSINOPHILS 3 0.5 - 7.8 %    BASOPHILS 1 0.0 - 2.0 %    IMMATURE GRANULOCYTES 0 0.0 - 5.0 %    ABS. NEUTROPHILS 2.4 1.7 - 8.2 K/UL    ABS. LYMPHOCYTES 2.0 0.5 - 4.6 K/UL    ABS. MONOCYTES 0.5 0.1 - 1.3 K/UL    ABS. EOSINOPHILS 0.1 0.0 - 0.8 K/UL    ABS.  BASOPHILS 0.0 0.0 - 0.2 K/UL    ABS. IMM. GRANS. 0.0 0.0 - 0.5 K/UL   PROTHROMBIN TIME + INR    Collection Time: 05/03/22 12:49 AM   Result Value Ref Range    Prothrombin time 13.6 12.6 - 14.5 sec    INR 1.0     METABOLIC PANEL, COMPREHENSIVE    Collection Time: 05/03/22 12:49 AM   Result Value Ref Range    Sodium 138 136 - 145 mmol/L    Potassium 4.1 3.5 - 5.1 mmol/L    Chloride 103 98 - 107 mmol/L    CO2 28 21 - 32 mmol/L    Anion gap 7 7 - 16 mmol/L    Glucose 100 65 - 100 mg/dL    BUN 18 8 - 23 MG/DL    Creatinine 1.15 (H) 0.6 - 1.0 MG/DL    GFR est AA 58 (L) >60 ml/min/1.73m2    GFR est non-AA 48 (L) >60 ml/min/1.73m2    Calcium 9.2 8.3 - 10.4 MG/DL    Bilirubin, total 0.6 0.2 - 1.1 MG/DL    ALT (SGPT) 17 12 - 65 U/L    AST (SGOT) 15 15 - 37 U/L    Alk. phosphatase 77 50 - 136 U/L    Protein, total 7.6 6.3 - 8.2 g/dL    Albumin 3.2 3.2 - 4.6 g/dL    Globulin 4.4 (H) 2.3 - 3.5 g/dL    A-G Ratio 0.7 (L) 1.2 - 3.5     MAGNESIUM    Collection Time: 05/03/22 12:49 AM   Result Value Ref Range    Magnesium 2.2 1.8 - 2.4 mg/dL   LIPASE    Collection Time: 05/03/22 12:49 AM   Result Value Ref Range    Lipase 237 73 - 393 U/L   TROPONIN-HIGH SENSITIVITY    Collection Time: 05/03/22 12:49 AM   Result Value Ref Range    Troponin-High Sensitivity 15.0 (H) 0 - 14 pg/mL   NT-PRO BNP    Collection Time: 05/03/22 12:49 AM   Result Value Ref Range    NT pro- <450 PG/ML     CT PERF W CBF    Result Date: 5/3/2022  CT Perfusion Imaging INDICATION:  Neuro deficit. Evaluate for large vessel occlusion. COMPARISON: December 26, 2021. CT perfusion imaging of the brain was performed after the administration of intravenous contrast.  Perfusion maps and perfusion analysis output were generated using the Vis ai perfusion processing software algorithm. Radiation dose reduction techniques were used for this study:   All CT scans performed at this facility use one or all of the following: Automated exposure control, adjustment of the mA and/or kVp according to patient's size, iterative reconstruction. FINDINGS: Vis ai Output Values: CBF < 30% volume:  0 ml   (core infarction volume greater than 50 cc associated with poor outcomes) Tmax > 6 seconds: 0 ml Tmax/CBF Mismatch Volume: 0 ml Tmax/CBF Mismatch Ratio: N/A Hypoperfusion Intensity Ratio: 0   (values greater than 0.5 associated with poor outcome) Tmax > 10 seconds Volume: 0 ml   (volume greater than 100 mL is associated with poor outcome)     No evidence of core infarct or ischemic penumbra. CT CODE NEURO HEAD WO CONTRAST    Result Date: 5/3/2022  Clinical history: Acute neuro changes. Dementia. Frequent episodes of hyperventilation TECHNIQUE: Axial, coronal and sagittal CT of the brain without IV contrast. Radiation dose reduction techniques were used for this study:  Our CT scanners use one or all of the following: Automated exposure control, adjustment of the mA and/or kVp according to patient's size, iterative reconstruction. COMPARISON: December 2021. FINDINGS: There is no acute intracranial hemorrhage or CT evidence of acute territorial infarction. There is no mass effect, midline shift or hydrocephalus. No extra-axial fluid collection. Cerebellum on the brainstem are grossly unremarkable. Periventricular hypodensity, nonspecific and likely due to chronic small vessel changes. Included globes appear intact. Partially visualized paranasal sinuses and the mastoid air cells are aerated. There is no skull fracture. 1. No acute intracranial hemorrhage or CT evidence of acute territorial infarction. Note that MRI is more sensitive for detection of acute/subacute infarct. EKG interpretation personally: Rate 66. Normal sinus rhythm. Normal [place] and QT interval.    ED Course as of 05/03/22 0337   Mon May 02, 2022   2350 Code stroke initiated upon evaluation. [JL]   Tue May 03, 2022   0814 Discussed with teleneurology. Does not recommend tPA as patient is now out of the tPA window.   Does recommend patient receive CTA/CTP. 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 If no LVO was noted, recommends admission for MRI and then increasing patient's Keppra dose to 500 twice daily. [JL]      ED Course User Index  [JL] Maria T Distance, DO     MDM  Number of Diagnoses or Management Options  Diagnosis management comments: 20-year-old -American female presented emergency department via EMS with left-sided facial droop and left-sided weakness. Patient not a tPA candidate secondary to time of onset. Patient was seen by teleneurology. Patient's labs and imaging are reassuring. Patient does have a history of partial seizures because left-sided weakness however her speech was different tonight as well as her facial droop. Patient will be given an extra dose of Keppra tonight and will be admitted to the hospitalist for further treatment evaluation to rule out possible CVA. Amount and/or Complexity of Data Reviewed  Clinical lab tests: ordered and reviewed  Tests in the radiology section of CPT®: ordered and reviewed  Decide to obtain previous medical records or to obtain history from someone other than the patient: yes  Obtain history from someone other than the patient: yes  Review and summarize past medical records: yes  Discuss the patient with other providers: yes  Independent visualization of images, tracings, or specimens: yes    Patient Progress  Patient progress: stable        Disposition: Admitted  Diagnosis:     ICD-10-CM ICD-9-CM   1. Left-sided weakness  R53.1 728.87   2. Facial droop  R29.810 781.94     ____________________________________________________________________  A portion of this note was generated using voice recognition dictation software.  While the note has been reviewed for accuracy, please note certain words and phrases may not be transcribed as intended and some grammatical and/or typographical errors may be present.

## 2022-05-03 NOTE — PROGRESS NOTES
INTERNAL MEDICINE PROGRESS NOTE    Subjective:   Daily Progress Note: 5/3/2022 9:56 AM    Today, feels better. No acute events. No further spx    Meds:  Current Facility-Administered Medications   Medication Dose Route Frequency    donepeziL (ARICEPT) tablet 10 mg  10 mg Oral QHS    escitalopram oxalate (LEXAPRO) tablet 10 mg  10 mg Oral DAILY    memantine (NAMENDA) tablet 10 mg  10 mg Oral DAILY    risperiDONE (RisperDAL) tablet 0.25 mg  0.25 mg Oral BID    sodium chloride (NS) flush 5-40 mL  5-40 mL IntraVENous Q8H    sodium chloride (NS) flush 5-40 mL  5-40 mL IntraVENous PRN    ondansetron (ZOFRAN) injection 4 mg  4 mg IntraVENous Q6H PRN    aspirin tablet 325 mg  325 mg Oral DAILY    acetaminophen (TYLENOL) tablet 650 mg  650 mg Oral Q4H PRN    bisacodyL (DULCOLAX) tablet 5 mg  5 mg Oral DAILY PRN    enoxaparin (LOVENOX) injection 30 mg  30 mg SubCUTAneous Q24H    atorvastatin (LIPITOR) tablet 40 mg  40 mg Oral QHS    levETIRAcetam (KEPPRA) tablet 500 mg  500 mg Oral BID    losartan (COZAAR) tablet 50 mg  50 mg Oral DAILY         Objective:   Vitals:  Visit Vitals  BP (!) 141/75 (BP 1 Location: Right upper arm, BP Patient Position: At rest) Comment: RN Notified   Pulse 82   Temp 98.4 °F (36.9 °C)   Resp 16   Ht 5' 2\" (1.575 m)   Wt 64.4 kg (141 lb 15.6 oz)   SpO2 100%   BMI 25.97 kg/m²    O2 Flow Rate (L/min): 2 l/min O2 Device:  (changed to RA)  Temp (24hrs), Av.1 °F (36.7 °C), Min:97.4 °F (36.3 °C), Max:98.4 °F (36.9 °C)      I/O:  No intake/output data recorded. No intake/output data recorded. Exam:  General appearance: awake, alert, cooperative, mild distress, appears stated age - Disoriented. Lungs: clear to auscultation bilaterally   Heart: regular rate and rhythm, S1, S2 normal, no murmur, click, rub or gallop. Abdomen: soft, no tenderness, no distension, normal bowel sound, no masses, no organomegaly  Extremities: atraumatic, no cyanosis - Bilateral lower limbs edema none.   Skin: No rashes or ulceration. Neurologic: Grossly intact - perrla, moves 4 limbs, no M/S deficits    Data Review (Labs):  Recent Labs     05/03/22 0049   WBC 5.1   HGB 12.9   HCT 39.1        Recent Labs     05/03/22 0049 05/03/22 0040     --    K 4.1  --      --    CO2 28  --      --    BUN 18  --    CREA 1.15*  --    MG 2.2  --    INR 1.0 0.9       Assessment/Plan:     1- Possible TIA, w/ slurred speech and facial droop reported per daughter and history. No evidence of stroke per MRI brain. Asymptomatic so far. The daughter states the episodes has been happening for 2 years. 2- Possible panic attacks w/ hyperventilation, reported by daughter, taking risperdal prn at home and didn't use it for 2 weeks. 3- HTN, not well controlled, improved  4- Hx of Alzheimer dementia and seizures. Rx:  ASA, statin  Neuro checks  Follow EEG and Psych evaluation  BP control    Disposition: home health  Case discussed with patient, family and RN. I spoke to . Per my conversation w/ the daughter at bedside, she objected that I talk to the pt as she has dementia and will forget everything. I told her that even though she will forget, she still understand everything I say and it's a human interaction and respect to the patient. And that I am aware that she (the daughter) is also present and listening to everything I say plus all instructions will be on paper when pt discharged as well. As I was telling the daughter that hyperventilation at home is likely due to anxiety and may be her PCP consider something like Xanax as needed, as pt is using Risperdal prn now. The daughter kept repeating that risperdal is being given to her because of behavioral problems, not hyperventilating.  And as I am explaining to her that hyperventilation is one symptom  related to anxiety, not due to any lung issues, she kept arguing that her mother was on oxygen when she came even I explained to her there is no evidence of respiratory problem. When I told the daughter that I will get a psychiatric evaluation and they will do that on screen, so I will ask the nurses to call her to be available at the time they do the consult if she is not here, she said that she is the caregiver and here all the time and if I am implying that she is not caring for her mother. The entire conversation with the daughter was quite difficult w/ lack of understanding and/ or misunderstanding and moments of anger expressed by the daughter.     Signed By: Leighann Quinonez MD     May 3, 2022

## 2022-05-03 NOTE — PROGRESS NOTES

## 2022-05-04 VITALS
OXYGEN SATURATION: 97 % | BODY MASS INDEX: 26.13 KG/M2 | HEART RATE: 65 BPM | DIASTOLIC BLOOD PRESSURE: 72 MMHG | HEIGHT: 62 IN | WEIGHT: 141.98 LBS | TEMPERATURE: 98.2 F | SYSTOLIC BLOOD PRESSURE: 127 MMHG | RESPIRATION RATE: 16 BRPM

## 2022-05-04 PROBLEM — R29.90 STROKE-LIKE EPISODE: Status: RESOLVED | Noted: 2022-05-03 | Resolved: 2022-05-04

## 2022-05-04 PROBLEM — U07.1 COVID-19: Status: RESOLVED | Noted: 2021-01-05 | Resolved: 2022-05-04

## 2022-05-04 LAB
GLUCOSE BLD STRIP.AUTO-MCNC: 90 MG/DL (ref 65–100)
GLUCOSE BLD STRIP.AUTO-MCNC: 93 MG/DL (ref 65–100)
SERVICE CMNT-IMP: NORMAL
SERVICE CMNT-IMP: NORMAL

## 2022-05-04 PROCEDURE — 74011250637 HC RX REV CODE- 250/637: Performed by: HOSPITALIST

## 2022-05-04 PROCEDURE — 82962 GLUCOSE BLOOD TEST: CPT

## 2022-05-04 PROCEDURE — 74011250636 HC RX REV CODE- 250/636: Performed by: FAMILY MEDICINE

## 2022-05-04 PROCEDURE — 74011250637 HC RX REV CODE- 250/637: Performed by: FAMILY MEDICINE

## 2022-05-04 PROCEDURE — 97530 THERAPEUTIC ACTIVITIES: CPT

## 2022-05-04 PROCEDURE — 95816 EEG AWAKE AND DROWSY: CPT | Performed by: HOSPITALIST

## 2022-05-04 PROCEDURE — 90792 PSYCH DIAG EVAL W/MED SRVCS: CPT | Performed by: NURSE PRACTITIONER

## 2022-05-04 PROCEDURE — 74011000250 HC RX REV CODE- 250: Performed by: FAMILY MEDICINE

## 2022-05-04 RX ORDER — ASPIRIN 81 MG/1
81 TABLET ORAL DAILY
Qty: 30 TABLET | Refills: 0 | Status: SHIPPED | OUTPATIENT
Start: 2022-05-04

## 2022-05-04 RX ORDER — RISPERIDONE 0.25 MG/1
0.25 TABLET, FILM COATED ORAL 2 TIMES DAILY
Qty: 60 TABLET | Refills: 0 | Status: SHIPPED | OUTPATIENT
Start: 2022-05-04 | End: 2022-05-13 | Stop reason: SDUPTHER

## 2022-05-04 RX ORDER — LEVETIRACETAM 500 MG/1
500 TABLET ORAL 2 TIMES DAILY
Qty: 60 TABLET | Refills: 0 | Status: SHIPPED | OUTPATIENT
Start: 2022-05-04 | End: 2022-06-03

## 2022-05-04 RX ORDER — LEVETIRACETAM 500 MG/1
500 TABLET ORAL 2 TIMES DAILY
Qty: 60 TABLET | Refills: 0 | Status: SHIPPED | OUTPATIENT
Start: 2022-05-04 | End: 2022-05-04 | Stop reason: SDUPTHER

## 2022-05-04 RX ADMIN — ENOXAPARIN SODIUM 30 MG: 100 INJECTION SUBCUTANEOUS at 05:04

## 2022-05-04 RX ADMIN — LOSARTAN POTASSIUM 50 MG: 50 TABLET, FILM COATED ORAL at 08:18

## 2022-05-04 RX ADMIN — ESCITALOPRAM OXALATE 10 MG: 10 TABLET ORAL at 08:19

## 2022-05-04 RX ADMIN — ASPIRIN 325 MG: 325 TABLET, FILM COATED ORAL at 08:19

## 2022-05-04 RX ADMIN — RISPERIDONE 0.25 MG: 0.5 TABLET ORAL at 08:19

## 2022-05-04 RX ADMIN — SODIUM CHLORIDE, PRESERVATIVE FREE 10 ML: 5 INJECTION INTRAVENOUS at 06:00

## 2022-05-04 RX ADMIN — LEVETIRACETAM 500 MG: 500 TABLET, FILM COATED ORAL at 08:19

## 2022-05-04 RX ADMIN — MEMANTINE 10 MG: 5 TABLET ORAL at 08:19

## 2022-05-04 NOTE — CONSULTS
Psychiatric Evaluation    Date of Service: 5/4/2022    Purpose:    Psychiatric Diagnostic Evaluation  Referral Source: Kashif Dunbar MD  History From:  patient and patient chart    Reason for Consult:  anti-psychotics, evaluation    History of Present Illness: Tatyana Vick is a 78 y.o. female with a history significant for anxiety, depression. Pt has a PMH that includes:  Alzheimers dementia, unclear seizure disorder, HTN, GERD, HLD. Pt presented to the ED on 5-2-2022, c/o:  Triage note - Pt is from home. Ems state that the pt has dementia and has frequent periods of hyperventilation. Has had another episode and the family was unable to get the pt calmed down. Ems did get the pt calmed down in transport. Masked on arrival  MD note - 67-year-old [facility] American female with history of Alzheimer's dementia and grade 2 diastolic dysfunction presented to the emergency department with complaints of hyperventilating. Per EMS, patient has done this before and they were able to get her to calm down in the ambulance. Per family, patient went to bed normal around 7:00 tonight and about 930 she woke up with her symptoms of hyperventilating. Patient's family stated that they noticed that the left side of her face did not look right and they were having a hard time helping her walk because she seemed to be weak. They did not notice a specific extremity or side that was weaker than the other side but they were concerned about possibly patient having a stroke. When patient symptoms started, she was having a very difficult time talking to the family and family states that they could not understand what she was saying. Patient's speech has significantly improved however patient does still have decree sensation in her left upper and lower extremity as well as left facial droop and mild left-sided weakness. This was not mentioned in by EMS. Code stroke initiated upon evaluation of patient.   Patient denies any pain or any other symptoms at this time. 23-year-old [facility] American female presented emergency department via EMS with left-sided facial droop and left-sided weakness. Patient not a tPA candidate secondary to time of onset. Patient was seen by tele neurology. Patient's labs and imaging are reassuring. Patient does have a history of partial seizures because left-sided weakness however her speech was different tonight as well as her facial droop. Patient will be given an extra dose of Keppra tonight and will be admitted to the hospitalist for further treatment evaluation to rule out possible CVA. CT Perfusion Imaging  IMPRESSION - No evidence of core infarct or ischemic penumbra. CT arteriogram of the neck and head  IMPRESSION -  1. No acute large vessel occlusion or high-grade stenosis. 2.  Mild contour abnormality of the mid right ICA and suprasellar right ICA  suggestive of fibromuscular dysplasia (FMD). 3.  Moderate tortuosity of the bilateral ICAs which may reflect chronic  hypertension. MRI Brain wo cont  IMPRESSION -   No acute or subacute appearing intracranial abnormality. Similar mild chronic white matter microvascular ischemic changes. 5-3-2022  Hospitalist note - Wilmer Galvan is a 78 y.o. female with medical history of hypertension, Alzheimer's, and unclear seizure disorder, who presented with \"hyperventilation\". EMS reports that they were called due to hyperventilating. Family reports that she does this from time to time, but tonight they could not get her calm down. Then they noted that the left side of her face seemed to not look right and they were having hard time helping her walk. She just seemed to be weaker than normal.  They could not tell that it was 1 side or the other at that time. They report that she went to bed around 7 PM and she was normal at that time. It was about 930 when she woke up with the hyperventilating.   While she was still at home, her speech was also slurred and family could not understand what she was saying. A code stroke was called upon the patient's arrival.  Initial evaluation with CT was unremarkable. Some of her symptoms, particularly her speech, had already improved at time of arrival.  Initial evaluation in the ER showed her to still have a left facial droop and mild left-sided weakness. Her speech was better and essentially back to baseline. She was evaluated by the [facility] neurologist.  She was out of the window for tPA. They recommended admission for stroke evaluation. And also notes that this may have been a partial seizure and recommends increasing her Keppra dose to 500 twice daily. Due to her dementia, she does not answer review of system questions well but denies fever/chills, NVD, abdominal pain, chest pain, shortness of breath. Her daughter is here with her and feels that she is at her usual baseline. 5-3-2022  RT -     05/03/22 0834   Oxygen Therapy   O2 Sat (%) 100 %   O2 Device    (changed to RA)     CM note - CM referral received to assist with discharge planning following evaluation to rule out a stroke. Patient is an 77 y/o female who has a history of Alzheimers dementia. She has lived with her dtr and son in law for the past 3 years. Other family members assist at times and patient goes to adult day care on weekdays. Justa De Jesus works full time and is patient's primary caregiver. At baseline, pt is independent with ADL's and ambulation. CM met with dtr who requested assistance in initiating a long-term plan for patient's care. Dtr was very tearful and stated that she is under a great deal of stress. Dtr reported that behavior issues are becoming more frequent. CM provided emotional support. Dtr and CM discussed several options including respite care, increased assistance from family members, and possible long term care placement in a memory care facility.  CM also suggested discussing behaviors with patient's neurologist and/or PCP. Dtr reported that she has participated in educational seminars for caregivers and has some knowledge about the Alzheimers Association. CM provided information about local resources. CM also provided information about [facility] and explained that this agency can assist with arranging a family meeting and with placement options. Dtr stated that they have applied for Medicaid in the past but were denied due to patients assets (home and insurance policy). Family may need to sell patient's home and use funds from the sale to care for patient. Dtr expressed understanding and agreement. Dtr expressed appreciation for the information. CM encouraged dtr to apply for Medicaid and if denied, inquire about the correct way to utilize patient's resources for a spend down. Pt to discharge later today or tomorrow. Dtr to look into support services and speak with family about a long-term plan. PT note - Ms. Dary Alexandre presents with generalized weakness and decreased independence with functional mobility and will benefit from skilled PT interventions to maximize independence with functional mobility. Per chart family stating pt having difficulty walking and leg weakness. Per chart pt has dementia. Pt walked in room and in and out of bathroom. She is slightly unsteady. Worked today on walking in [place] with verbal cues. Pt a little unsteady in [place] and reporting some right leg pain but when asked specific questions about her pain she could not answer. Pt got a little better walking the further into the walk we got. Unsure of her baseline mobility and pt not able to articulate, no family in room. Could be that pt is getting close to her baseline. Hope to progress her mobility at next therapy session. Hospitalist note - Today, feels better. No acute events. No further spx  1- Possible TIA, w/ slurred speech and facial droop reported per daughter and history.  No evidence of stroke per MRI brain. Asymptomatic so far. The daughter states the episodes has been happening for 2 years. 2- Possible panic attacks w/ hyperventilation, reported by daughter, taking Risperdal prn at home and didn't use it for 2 weeks. 3- HTN, not well controlled, improved  4- Hx of Alzheimer dementia and seizures. Rx:  ASA, statin  Neuro checks  Follow EEG and Psych evaluation  BP control     Disposition: home health  Case discussed with patient, family and RN. I spoke to . Per my conversation w/ the daughter at bedside, she objected that I talk to the pt as she has dementia and will forget everything. I told her that even though she will forget, she still understand everything I say and it's a human interaction and respect to the patient. And that I am aware that she (the daughter) is also present and listening to everything I say plus all instructions will be on paper when pt discharged as well. As I was telling the daughter that hyperventilation at home is likely due to anxiety and may be her PCP consider something like Xanax as needed, as pt is using Risperdal prn now. The daughter kept repeating that risperdal is being given to her because of behavioral problems, not hyperventilating. And as I am explaining to her that hyperventilation is one symptom related to anxiety, not due to any lung issues, she kept arguing that her mother was on oxygen when she came even I explained to her there is no evidence of respiratory problem. When I told the daughter that I will get a psychiatric evaluation and they will do that on screen, so I will ask the nurses to call her to be available at the time they do the consult if she is not here, she said that she is the caregiver and here all the time and if I am implying that she is not caring for her mother.   The entire conversation with the daughter was quite difficult w/ lack of understanding and/ or misunderstanding and moments of anger expressed by the daughter. ----------------------------------------------------------------------------------------------------------------------------------------------------------------------------------------------------------------------------------------------------------------------------------------------------    Chart Review:  10-6-2017  OV  Meds:  Prozac 10 mg daily  Anxiety  11-  OV -Mild cognitive impairment  MMSE 19/30 today (27/30 2016). Discuss with Dr Dara Cortez at follow   12-1-2017  OV - Memory Loss    The history is provided by the patient. This is a chronic problem. The problem has been gradually worsening. Mental status baseline is normal.  Her past medical history is significant for hypertension  Still drives and cooks daughter writes her bills, most of her MMSE losses are language or concentration. Could not spell backwards or substraction and could not write a sentence or follow the \"close your eyes command\" or copy the hexagons. She has limited driving, spends a fair amount of time helping with her great grandchild and does not have many meds. Family will watch for changes  4-  OV - Memory Loss    The history is provided by the patient. This is a chronic problem. The problem has not changed since onset. Pertinent negatives include no confusion and no numbness. Her past medical history is significant for hypertension. Abnormal MMSE  Assessment & Plan:  Still driving and not getting lost  MMSE 19, will repeat on the Lexapro on return  Anxiety   The history is provided by the patient and relative. This is a chronic problem. The problem occurs daily. The problem has not changed since onset. Nothing relieves the symptoms. Anxiety  -     escitalopram oxalate (LEXAPRO) 5 mg tablet; Take 1 Tab by mouth daily.  Indications: ANXIETY WITH DEPRESSION    7-  OV - MMSE has been from 18 to 24 over the last 6 months, seems stable in her level of functioning and daughter is following    Mild cognitive impairment  Probably stable did better on the second one than the first, cognitively unchanged. Will continue to follow, daughter her with her today and daughter has written her checks ever since her  . Hearing worsening though    2018  OV - Chioma Espinoza is a [de-identified] y.o. female seen for a follow up visit regarding Hypertension (bp check and discuss labs ) and Memory Loss ( short term memory . Pt did a MMSE last ov and pt daughter states her memory is getting worse. Pt is having a hard time ordering at restaurants)  Memory worsening per daughter    Abnormal MMSE  Repeat a little worse, will try Aricept 5 mg a day    3-  OV - Some decline but mostly recent confusion, called her daughter repeatedly, tried to get some money from her sister as bank closed, confused about other things. Now focusing on her bone density. Lives alone and 2 daughters are watching this. Depression noted, worse? She can rise from her chair without pushing off, and gait not bad and will work on activity with her daughter and OK to go to do some walking at facility. \  Did not do imaging today or repeat MMSE as sending to geriatrician anyway for further eval. She is maintaining her weight so increased the Aricept and also the escitalopram. Daughter will check her home BP    6-3-2019  OV - Late onset Alzheimer's disease without behavioral disturbance  -     TSH 3RD GENERATION  -     VITAMIN B12  Follow [facility] with [facility]    2019  OV  Meds:  Namenda 5 mg bid / Aricept 10 mg nightly / Lexapro 10 mg    2020  OV - She is living with her daughter now and may go back to adult day care. Son in law just had an MI. Hearing is an issue. She has become irritable at times with family members other than her daughter. ER eval for weakness and inability to speak, went to ER and neg head CT and lab and EKG, and neg eval, did not keep her as COVID.    She thinks sometimes neighbors are coming to harm her  Has not seen the neurologist yet due to [place]    Late onset Alzheimer's disease with behavioral disturbance ([facility] [address]  -     risperiDONE (RisperDAL) 0.25 mg tablet; Take 1 Tab by mouth two (2) times a day. 7-8-2020  Neurology OV - We have reviewed dementia related symptoms, patient has good insight, thoughts were coherent. She was encouraged to have early start day schedule to avoid sundown syndrome. Maximize Namenda, as well as Aricept if heart rate is normal in the near future. 8-  EEG -Interpretation: Normal electroencephalogram, awake, drowsy and with activation procedure. There are no epileptiform discharges or focal slowing activities. EKG monitoring and oximetry are normal.     10-2-2020  OV - She is here with her daughter and is doing well, with intermittent confusion. No depression, still bathing and dressing, good hygiene. Lives with her daughter and son in law    1-5-2021  [address] - She tested positive for COVID and now has been incontinent, has had a fall, and she is not able to do her usual activities. Her daughter is concerned that this may be acute worsening of her dementia. She had an episode in October and she could not speak or walk and then this resolved. 1-8-2021 [facility] [facility] in [address] patient has an appt with her daughter. Her daughter reports that the patient is not doing well. She tested positive for covid about 2 weeks ago. \"Overnight she literally became a different person. \" She can \"no longer care for herself. \" \"I have to prompt her to do things now. \" She is weaker now than before, shuffling more. She has given the risperdal a few times, but not often. Now she is more agreeable and complacent. Sleeping more now than before. She will \"just stare off into space. \" She cannot cognitively engage in TV or word searches. She can no longer use her phone. Her daughter is exhausted, waking up at all hours of the night to care for her.  Her daughter thinks she needs to be in a nursing facility and is wondering about placement/assistance. 1671 gross, 1443. 80. She is staying with her daughter, on off, but also owns her house. They sometimes stay at her house, other times they stay at her daughters house. 12-  ED - 78-year-old female with history of Alzheimer's dementia presents with daughter with complaint of unresponsiveness and leaning to right side that occurred earlier this evening. States that this was noted around 6:30 pm.  Daughter states the patient was unresponsive and leaning to the right staring off into space. States that she has intermittent confusion but that symptoms this evening were much worse than baseline. Denies witnessed seizure-like activity, chest pain, shortness of breath, nausea, vomiting, fever, chills. Denies lateralized weakness, numbness, or coordination problems. States that she is on aspirin. 12-  Neurology note - Impression: Normal awake EEG. 12-  CM note - CM met with patient daughter ([place]) at bedside to discuss discharge plan and needs. Daughter states that patient lives back and forth from home to her house. States that patient attends [facility] in [facility] x5 day a week 8 hrs a day. Daughter states that patient has been denied for Medicaid. States that patient has an income of $1700 a month. Daughter was provided resources and states she has tried the resources that were provided and they had no discounts and couldn't met the patient needs. Patient is currently observation and CM explained that to daughter. Daughter is requesting that patient has LTC at a facility. CM inquired about private pay and daughter states that they can't afford private pay. CM informed daughter that PT/OT had no further recommendation for patient. CM informed daughter that patient do has discharge orders to return home today.   Daughter remain at bedside and will transport patient home. CM will continue to monitor. 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 Previous 2 EEGs were negative. Agree with Keppra low dosage, will slowly gradually titrate up to 500 twice a day, recheck BMP in 3 months. Patient receives good care for Alzheimer's dementia from PCP and family. Daughter her caregiver provided history as follows, on December 28, 2021 one of normal days, she was sorting folding clothes, became staring, not responding to family, difficulty with getting words out, went ER and had full stroke work up, negative. EEG was normal. Keppra was started, 250 mg bid, no side effect. Daughter reported that she had similar spells over the past 1.5 years, once every 6 months. Muscle twitching has improved. Alzheimer's dementia diagnosed 3 years ago, taking Aricept and Namenda. Memory problem slowly decline 4-5 years now, lives with daughter's family. Needs help for basic needs. Good interaction with her 10year-old grand daughter. Participates in day program.    4-4-2022 [facility] ED - Shortness of breath - 57-year-old female history of Alzheimer's disease, hypertension, HFpEF resents to the ED with complaint of dyspnea. Somewhat difficult historian given her dementia but it seems like onset today. Patient denies headache chest or abdominal pain. Denies lower extremity edema. Denies fever chills or cough. She is vaccinated for COVID with a booster. Patient had echocardiogram in November 2019 which showed grade 2 diastolic dysfunction, hyperdynamic LVEF, mild MR, mild TR.     Patient denies unilateral leg swelling, hemoptysis, surgery requiring anesthesia in the past 4 weeks, exogenous estrogen use, recent significant travel/immobilization, known history of malignancy with treatment within the past 6 months/or with palliative treatment, known hypercoagulable state, or prior venous thromboembolism. The patient is a 78 y.o. female who presented to the ED for shortness of breath that started at  today. She has no complaints at this time. At time of sign out, plan discussed:   Patient has a pending d-dimer and trop. If negative, reeval. Patient can be discharged. HR stable. Patient \"feels fine. \" Lungs CTAB. Discussed results with daughter. Recommend PCP follow up and advised she should return with persistent or worsening SOB, chest pain, swelling, confusion, or further concerns. Stable for dc.        ----------------------------------------------------------------------------------------------------------------------------------------------------------------------------------------------------------------------------------------------------------------------------------------------------    2022 - Met with pt and daughter in the room. Pt is alert / she can tell me her name and  and who her daughter is Ivette Flores) and that she has another daughter Elray Schlatter. She also tells me she was  but her  (who was a ) has passed. Pt tells me she walked in the hospital today and enjoyed this. She is pleasant, calm and cooperative. Pts daughter assists with hx and recent status at home. Daughter confirms pt has hx of anxiety and depression / she is unsure of any MH medications other than current. She states pt was born and raised in [place] / still has 2 brothers and 2 sisters that live local.  Pt was  for  40+ yrs until his passing / which was difficult for the pt /  was a . She states pt has no hx of self harm, harm to other or psychiatric admissions. Pts MH medications have always been managed by PCP - no hx of outpt psychiatry. Daughter states they started noting memory problems approx 4-5 yrs ago.   Pt [facility] at [facility] about a [facility] ago, but has not been back.    Daughter states pt lives with her / and in the household it is pt, her, her  and their son and daughter and 2 grandchildren. Daughter states pt knows and recognizes family members that are around her a lot but not the ones that are not in her life regularly / she has little short term memory / they are assisting her with ADLs, meals, medications. Daughter states pt goes to an adult day program now - usually from 8:30am - 5pm.  States she does well. Pt confirms that she enjoys the other people at the adult program  / she has friends there / they work on puzzles and do games / she also likes the staff. Daughter states the adult program is helpful because she and her  work but with pts recent progression of intermittent paranoia (thinking they are trying to kill her), verbal aggression and increased sleep disturbances, the stress level has increased significantly in their house. Daughter becomes tearful when discussing the stressors with being pts caregiver / physical and mental exhaustion / along with just stressors over watching her mother decline as the disease progresses. LONG discussion about this / validating daughters emotions and fears. Daughter states they do have [employer] family and other family (pts siblings and others) that check on them and the pt, sometimes assisting in taking pt places. Acknowledges that this is a big help. Pt has not been going to [employer] since [place]. Pt tells me she feels \"good\" / states she only worries about her  that passed away, stating this \"bothers her\" at times. Validated this feeling. Discussed the progression of this disease and what this could look like as it pertains to pts behaviors, to include anxiety, panic, sundowning, SOB or hyperventilation (possible triggers).   Discussed current medications (Lexapro and Risperdal) - per daughter, pt has only been taking the Risperdal prn / but it has been scheduled bid in the hospital (low dose 0.25 mg) and was told by MD that scheduled may be more helpful - discussed this - how this medication works - first line to help manage agitation in dementia - favorable SE profile and low risk of EPS at low doses / but also discussed black box warnings with antipsychotics and dementia pts. Encouraged daughter to make follow up appts with PCP, [facility] and Neurology so pt can be followed while on antipsychotic scheduled / and they can also evaluate for need of additional medications to help with pts anxiety. Daughter states they want to start looking for long term placement / memory care. This is difficult because she is unsure if this is affordable / pt has been told she does not qualify for [facility] / discussed this and gave daughter Mildred number ([facility]) to contact to get guidance on next steps. Discussed daughters stressors as a caregiver and the importance of maintaining her own mental health so she can continue to be a support for her mother / encouraged support groups for other caregivers of family with Alzheimers. Pt is negative for SI/HI/AVH. Pt does not appear to be responding to any internal stimuli at this time.       Psychiatric Review Of Systems:  Sleep: 10-12 hrs - getting up some during the night  Appetite: stable  Current suicidal/homicidal ideations: denies  Current auditory/visual hallucinations: denies - pt does not appear to be responding to any internal stimuli at this time    Medications:    Current Facility-Administered Medications:     donepeziL (ARICEPT) tablet 10 mg, 10 mg, Oral, QHS, Earline Colindres MD, 10 mg at 05/03/22 2107    escitalopram oxalate (LEXAPRO) tablet 10 mg, 10 mg, Oral, DAILY, Earline Colindres MD, 10 mg at 05/03/22 0909    memantine (NAMENDA) tablet 10 mg, 10 mg, Oral, DAILY, Earline Colindres MD, 10 mg at 05/03/22 0908    risperiDONE (RisperDAL) tablet 0.25 mg, 0.25 mg, Oral, BID, Earline Ruiz, MD, 0.25 mg at 05/03/22 1801    sodium chloride (NS) flush 5-40 mL, 5-40 mL, IntraVENous, Q8H, Ramsey Colindres MD, 10 mL at 05/04/22 0600    sodium chloride (NS) flush 5-40 mL, 5-40 mL, IntraVENous, PRN, Ramsey Colindres MD    ondansetron [facility]) injection 4 mg, 4 mg, IntraVENous, Q6H PRN, Rashawn Pacheco MD    aspirin tablet 325 mg, 325 mg, Oral, DAILY, Ramsey Patel MD, 325 mg at 05/03/22 0908    acetaminophen (TYLENOL) tablet 650 mg, 650 mg, Oral, Q4H PRN, Rashawn Pacheco MD    bisacodyL (DULCOLAX) tablet 5 mg, 5 mg, Oral, DAILY PRN, Rashawn Pacheco MD    enoxaparin (LOVENOX) injection 30 mg, 30 mg, SubCUTAneous, Q24H, Rashawn Pacheco MD, 30 mg at 05/04/22 0504    atorvastatin (LIPITOR) tablet 40 mg, 40 mg, Oral, QHS, Ramsey Patel MD, 40 mg at 05/03/22 2107    levETIRAcetam (KEPPRA) tablet 500 mg, 500 mg, Oral, BID, Rashawn Pacheco MD, 500 mg at 05/03/22 2107    losartan (COZAAR) tablet 50 mg, 50 mg, Oral, DAILY, Ingris Staley MD, 50 mg at 05/03/22 3024    Allergies: Allergies   Allergen Reactions    Lisinopril Cough       Past Psychiatric History (over the past 6 months, unless otherwise specified):  Information obtained from chart review and patients daughter  Previous diagnoses/symptoms: anxiety, depression, Alzheimer's dementia  Self-injurious behavior/risky thoughts or behaviors (past suicidal ideation/attempt): none  Violence/Risk to others (past homicidal ideation/attempt): none  Current psychiatric provider: none  Previous psychiatric medication trials: Prozac, Lexapro, Namenda, Aricept, Risperdal  Previous psychiatric hospitalizations: none  Previous therapy: none  Previous ECT: none    Past Medical History:  History of head trauma: No  History of seizures: Yes - ?partial seizures - on Keppra   History of Surgeries or Hospitalizations:   Past Surgical History:   Procedure Laterality Date    HX CHOLECYSTECTOMY  10/03    HX ORTHOPAEDIC  Winter 2013    foot surgery-right: Dr. Salcido Pod surgery     AL TOTAL ABDOM HYSTERECTOMY      Ooph     Other Past Medical History: Active Ambulatory Problems     Diagnosis Date Noted    Essential hypertension 11/06/2015    Gastroesophageal reflux disease without esophagitis 11/06/2015    Osteoporosis 11/06/2015    Balance disorder 05/06/2016    Anxiety 10/06/2017    Bilateral hearing loss 07/13/2018    Moderate episode of recurrent major depressive disorder ([address] 03/15/2019    Intermittent confusion 03/15/2019    Late onset Alzheimer's disease with behavioral disturbance ([address] 06/03/2019    Renal insufficiency, mild 06/19/2019    Fasciculations of muscle 11/13/2019    Family hx of ALS (amyotrophic lateral sclerosis) 11/13/2019    Muscle twitching 07/08/2020    Axonal polyneuropathy 09/15/2020    Impacted cerumen of right ear 10/02/2020    COVID-19 01/05/2021    Urinary incontinence 01/05/2021    HTN (hypertension) 12/26/2021    Unresponsive episode 12/26/2021    Dementia ([address] 12/27/2021    Nonintractable epilepsy with complex partial seizures ([address] 02/03/2022     Resolved Ambulatory Problems     Diagnosis Date Noted    Mild cognitive impairment 06/29/2016    Encounter for immunization 06/29/2016    Paranoid ideation ([address] 03/15/2019    Late onset Alzheimer's disease without behavioral disturbance ([facility] 05/21/2019     No Additional Past Medical History         Substance Abuse History (over the past 12 months, unless otherwise specified):  Information obtained from patients daughter  Tobacco: Denies  Caffeine: Endorses - tea  Alcohol: Denies  Marijuana: Denies  Cocaine: Denies  Opiates: Denies  Benzodiazepine: Denies  Other illicit drug usage: Denies    Legal consequences of substance/alcohol use: No  History of substance/alcohol abuse treatment: No  Readiness for substance/alcohol abuse treatment, if applicable: N/A    Past Family History:  Family history of mental health conditions: did not discuss  Family history of suicide?  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  Legal History: none  Spiritual History: Judaism    EVALUATION    Vitals:   Visit Vitals  BP (!) 148/80 (BP 1 Location: Right upper arm, BP Patient Position: Supine)   Pulse 76   Temp 97.8 °F (36.6 °C)   Resp 16   Ht 5' 2\" (1.575 m)   Wt 141 lb 15.6 oz (64.4 kg)   SpO2 95%   BMI 25.97 kg/m²       Labs:   Recent Results (from the past 24 hour(s))   GLUCOSE, POC    Collection Time: 22 11:35 AM   Result Value Ref Range    Glucose (POC) 102 (H) 65 - 100 mg/dL    Performed by Deep Palafox    GLUCOSE, POC    Collection Time: 22  4:37 PM   Result Value Ref Range    Glucose (POC) 101 (H) 65 - 100 mg/dL    Performed by Madisyn    GLUCOSE, POC    Collection Time: 22  9:02 PM   Result Value Ref Range    Glucose (POC) 115 (H) 65 - 100 mg/dL    Performed by Kailey    GLUCOSE, POC    Collection Time: 22  6:25 AM   Result Value Ref Range    Glucose (POC) 90 65 - 100 mg/dL    Performed by [facility]:  Psychological ROS: positive for - memory difficulties, confusion, disorientation, anxiety, depression  Psychological ROS: negative for - SI/HI/AVH    Mental Status Evaluation:    General Appearance Appears stated age  General Behavior Calm, cooperative  Psychomotor Activity Normoactive - no restlessness or tremors noted  Gait                  Did not observe, pt lying in bed  Speech   fluent, normal volume, normal tone, normal rate, normal prosody and normal amount  Mood               States \"I'm doing good\"  Affect    appropriate  Thought Process Disoriented, confusion  Thought Content/Perceptual Disturbances Negative for hx, recent or current suicidal/homicidal ideation, auditory/visual hallucinations / hx of intermittent paranoia, delusions, prior to arrival but not at this time  Cognition                    Can give correct name, , daughters name and relation  Insight             impaired due to condition  Judgment            impaired due to condition    Unable to complete PHQ or KIRSTIE, r/t pt condition      ASSESSMENT  Psychiatric Diagnoses/Medical Diagnosis: Anxiety and depression [F41.9, F32. A (ICD-10-CM)]  Late onset Alzheimer's disease with behavioral disturbance ([facility] [address] [facility] [G30.1, F02.81 (ICD-10-CM)]      Recommendations:  -Pt needs follow up appts with PCP, [facility] and outpt neurologist - medication mgmt - daughter aware and states she will make appts  -Gave pts daughter contact number for [employer] ([facility]) to contact for guidance on long term placement for  Pt / spoke with Delfina Hayden and she is aware and will assist   -Can consider continuing Risperdal 0.25 mg po bid (first line to help manage agitation in dementia - favorable SE profile and low risk of EPS at low doses) and Lexapro 10 mg po daily - daughter aware of risks/benefits, including black box warnings  -Defer any additional medications needed for anxiety or behaviors to PCP and/or geriatrician  -Pt should return to the ED for any worsening of symptoms / adverse reactions to medications / changes in behaviors, including any SI/HI/AVH  -Daughter aware she can contact me in the ER for any other questions, concerns or resources  -Crisis plan reviewed.   Go to ED with emergent symptoms or safety concerns.  -Risks, benefits, side effects of medications, including any / all black box warnings, discussed with patients daughter, who verbalizes understanding. MD aware of evaluation / discussion      Anu GRUBBS  [facility]. [facility].  [address] [facility]

## 2022-05-04 NOTE — PROGRESS NOTES
Ayaka Guerrero from [address] [place] [address] called at this time needing confirmation on Keppra ordered, perfect serve sent to Dr. Maynor Deutsch.

## 2022-05-04 NOTE — DISCHARGE SUMMARY
Hospitalist Discharge Summary   Admit Date:  2022 11:26 PM   DC Note date: 2022  Name:  Mike Lala   Age:  78 y.o. Sex:  female  :  1940   MRN:  [id number]   Room:  [facility]  PCP:  Nicole Jamil MD    Presenting Complaint: Other    Initial Admission Diagnosis: Stroke-like episode [R29.90]     Problem List for this Hospitalization:  Hospital Problems as of 2022 Date Reviewed: 2022          Codes Class Noted - Resolved POA    Nonintractable epilepsy with complex partial seizures ([facility] [address] (Chronic) ICD-10-CM: G40.209  ICD-9-CM: 345.40  2/3/2022 - Present Yes        Dementia ([facility] [address] (Chronic) ICD-10-CM: F03.90  ICD-9-CM: 294.20  2021 - Present Yes        Late onset Alzheimer's disease with behavioral disturbance (HCC) (Chronic) ICD-10-CM: G30.1, F02.81  ICD-9-CM: 331.0, 294.11  6/3/2019 - Present Yes        Moderate episode of recurrent major depressive disorder ([facility] [address] ICD-10-CM: F33.1  ICD-9-CM: 296.32  3/15/2019 - Present Yes        Balance disorder ICD-10-CM: R26.89  ICD-9-CM: 781.99  2016 - Present Yes    Overview Signed 2016 11:32 AM by Nicole Jamil MD     Does not use a cane and no falls             Essential hypertension (Chronic) ICD-10-CM: I10  ICD-9-CM: 401.9  2015 - Present Yes        * (Principal) RESOLVED: Stroke-like episode ICD-10-CM: R29.90  ICD-9-CM: 781.99  5/3/2022 - 2022 Yes            Did Patient have Sepsis (YES OR NO): No    Hospital Course:  78 y.o. female with medical history of hypertension, Alzheimer's, and unclear seizure disorder, who presented with \"hyperventilation\". EMS reported that they were called due to hyperventilating. Family reported that she does this from time to time, but tonight they could not get her calm down. Then they noted that the left side of her face seemed to not look right and they were having hard time helping her walk.   She just seemed to be weaker than normal.  They could not tell that it was 1 side or the other at that time. They report that she went to bed around 7 PM and she was normal at that time. It was about 930 when she woke up with the hyperventilating. While she was still at home, her speech was also slurred and family could not understand what she was saying. A code stroke was called upon the patient's arrival.  Initial evaluation with CT was unremarkable. Some of her symptoms, particularly her speech, had already improved at time of arrival.  Initial evaluation in the ER showed her to still have a left facial droop and mild left-sided weakness. Her speech was better and essentially back to baseline. She was evaluated by the [facility] neurologist.  She was out of the window for tPA. They recommended admission for stroke evaluation. And also notes that this may have been a partial seizure and recommends increasing her Keppra dose to 500 twice daily. Due to her dementia, she did not answer review of system questions well but denied fever/chills, NVD, abdominal pain, chest pain, shortness of breath. Her daughter is here with her and feels that she is at her usual baseline. MRI was negative for acute process. EEG was pending at discharge. She has established with neurology. The daughter will contact neurology regarding follow-up and review of EEG contents. She should follow-up with her primary care provider within 7 days. Primary care provider may consider the following:  -EEG results  -Medication changes as noted  -Plan for follow-up with neurology  -Safety at home, memory care, behavioral disturbance      Disposition: Home or Self Care  Diet: ADULT DIET Regular  Code Status: Full Code    Follow Up Orders: Follow-up Appointments   Procedures    FOLLOW UP VISIT Appointment in: One Week Follow up with primary care provider within seven days. Follow up with neurology as scheduled. Follow up with primary care provider within seven days. Follow up with neurology as scheduled. Standing Status:   Standing     Number of Occurrences:   1     Order Specific Question:   Appointment in     Answer: One Week       Follow-up Information     Follow up With Specialties Details Why Contact Info    Nicole Jamil MD Internal Medicine Physician On 5/10/2022 Apt time 11:00 am [address] [facility] [facility]            Time spent in patient discharge and coordination 42 minutes. Plan was discussed with daughter, patient, nurse, . All questions answered. Patient was stable at time of discharge. Instructions given to call a physician or return if any concerns. Discharge Info:   Discharge Medication List as of 5/4/2022 12:21 PM      START taking these medications    Details   aspirin delayed-release 81 mg tablet Take 1 Tablet by mouth daily. , Normal, Disp-30 Tablet, R-0         CONTINUE these medications which have NOT CHANGED    Details   donepeziL (ARICEPT) 10 mg tablet Take 1 tablet by mouth nightly, Normal, Disp-90 Tablet, R-0      escitalopram oxalate (LEXAPRO) 10 mg tablet Take 1 Tablet by mouth daily. Indications: anxiousness associated with depression, Normal, Disp-90 Tablet, R-4      losartan (COZAAR) 25 mg tablet Take 1 Tablet by mouth daily. , Normal, Disp-90 Tablet, R-4      memantine (NAMENDA) 10 mg tablet Take 1 Tab by mouth two (2) times a day., Normal, Disp-180 Tab,R-3      levETIRAcetam (KEPPRA) 250 mg tablet 1 QAM, 2 QHS. , Normal, Disp-270 Tablet, R-1      risperiDONE (RisperDAL) 0.25 mg tablet Take 1 Tab by mouth two (2) times a day., Normal, Disp-100 Tab,R-4             Procedures done this admission:  * No surgery found *    Consults this admission:  IP CONSULT TO PSYCHIATRY    Echocardiogram/EKG results:  Results from Hospital Encounter encounter on 12/26/21    ECHO ADULT COMPLETE    Interpretation Summary    Left Ventricle: Left ventricle size is normal. Normal wall thickness. Normal wall motion.  The EF by visual approximation is 65 - 70%. Normal diastolic function.   Right Ventricle: Right ventricle size is normal. Normal systolic function.   Mitral Valve: Mild transvalvular regurgitation.   Interatrial Septum: Agitated saline study was negative with and without provocation. No evidence of intracardiac shunt.   Pericardium: Trivial pericardial effusion present. No indication of cardiac tamponade. EKG Results     Procedure 720 Value Units Date/Time    EKG, 12 LEAD, INITIAL [[id number]] Collected: 05/03/22 0049    Order Status: Completed Updated: 05/03/22 0805     Ventricular Rate 66 BPM      Atrial Rate 66 BPM      P-R Interval 160 ms      QRS Duration 72 ms      Q-T Interval 418 ms      QTC Calculation (Bezet) 438 ms      Calculated P Axis 81 degrees      Calculated R Axis 60 degrees      Calculated T Axis 68 degrees      Diagnosis --     !! AGE AND GENDER SPECIFIC ECG ANALYSIS !! Normal sinus rhythm  Cannot rule out Anterior infarct , age undetermined  Abnormal ECG  Confirmed by ST JONAH WATTERS MD (), KIM SZYMANSKI ([id number]) on 5/3/2022 8:05:24 AM            Diagnostic Imaging/Tests:   MRI BRAIN WO CONT    Result Date: 5/3/2022  EXAMINATION:MRI BRAIN WO CONT 5/3/2022 10:40 AM ACCESSION NUMBER: [id number] INDICATION: 68-year-old female with history of Alzheimer's dementia, presenting with strokelike episode, acute neuro changes. COMPARISON: MRI brain 12/27/2021. CT head, CTA head, and CT perfusion study 5/3/2022. TECHNIQUE: Multiplanar multisequence MRI of the brain without the administration of intravenous contrast. FINDINGS: Mild diffuse parenchymal atrophy. There is a mild degree of scattered and confluent foci of T2/FLAIR hyperintensity within the periventricular and deep white matter, nonspecific but most commonly seen with chronic small vessel ischemic changes and similar to prior. No midline shift or mass lesion. No evidence of intracranial hemorrhage or acute infarct.   There are no extra-axial fluid collections present. Ventricles are normal in size and configuration. No diffusion weighted signal abnormality is identified. Bilateral lens replacement. No acute or subacute appearing intracranial abnormality. Similar mild chronic white matter microvascular ischemic changes. CT PERF W CBF    Result Date: 5/3/2022  CT Perfusion Imaging INDICATION:  Neuro deficit. Evaluate for large vessel occlusion. COMPARISON: December 26, 2021. CT perfusion imaging of the brain was performed after the administration of intravenous contrast.  Perfusion maps and perfusion analysis output were generated using the Vis ai perfusion processing software algorithm. Radiation dose reduction techniques were used for this study: All CT scans performed at this facility use one or all of the following: Automated exposure control, adjustment of the mA and/or kVp according to patient's size, iterative reconstruction. FINDINGS: Vis ai Output Values: CBF < 30% volume:  0 ml   (core infarction volume greater than 50 cc associated with poor outcomes) Tmax > 6 seconds: 0 ml Tmax/CBF Mismatch Volume: 0 ml Tmax/CBF Mismatch Ratio: N/A Hypoperfusion Intensity Ratio: 0   (values greater than 0.5 associated with poor outcome) Tmax > 10 seconds Volume: 0 ml   (volume greater than 100 mL is associated with poor outcome)     No evidence of core infarct or ischemic penumbra. CTA CODE NEURO HEAD AND NECK W CONT    Result Date: 5/3/2022  Title:  CT arteriogram of the neck and head. Indication: Strokelike episode. History of seizure disorder. Technique: Axial images of the neck and head were obtained after the uneventful administration of intravenous iodinated contrast media. Contrast was used to best identify the arterial structures.   Images were reviewed on a separate, free standing, three-dimensional workstation as per the referring physicians request.  All stenosis percentages derived by comparing the narrowest segment with the distal Internal Carotid Artery luminal diameter, as described in the [employer] Symptomatic Carotid Endarterectomy Trial (NASCET) criteria. All CT scans at this facility are performed using dose reduction/dose modulation techniques, as appropriate the performed exam, including the following: Automated Exposure Control; Adjustment of the mA and/or kV according to patient size (this includes techniques or standardized protocols for targeted exams where dose is matched to indication/reason for exam); and Use of Iterative Reconstruction Technique. The study was analyzed by the [address] ai algorithm. Comparison: CTA 12/26/2021. Findings: Lungs:  No focal consolidation or pleural effusions. No suspicious pulmonary nodules. .  Bones:  No osseous destruction. Moderate multilevel degenerative changes in the cervical spine with degenerative disc disease spanning C3-C7. Paranasal sinuses:  Paranasal sinuses are clear. .  Brain:  No midline shift. No enhancing mass lesion or hydrocephalus. .  Soft tissues:  Subcentimeter nodule in the left thyroid lobe. Dural venous sinuses:  Patent. Aortic arch:  Non-aneurysmal. Arch vessels are patent. .  ANTERIOR CIRCULATION Right common carotid artery:  No significant stenosis or occlusion. Right internal carotid artery:  No significant stenosis or occlusion. Ectatic appearance of the suprasellar right ICA and right carotid siphon. Moderate tortuosity of the mid to distal right ICA. Mild contour abnormality along the mid right ICA (series 4, image 278) as well as the suprasellar right ICA with a beading-like pattern. Right middle cerebral artery:  No significant stenosis, occlusion, or aneurysm. Right anterior cerebral artery:  No significant stenosis, occlusion, or aneurysm. Left common carotid artery: No significant stenosis or occlusion. Left internal carotid artery:  No significant stenosis or occlusion. Ectatic appearance of the left carotid siphon. Moderate tortuosity of the distal cervical left ICA.  Left middle cerebral artery:  No significant stenosis, occlusion, or aneurysm. Left anterior cerebral artery:  No significant stenosis, occlusion, or aneurysm. Anterior communicating artery: No significant stenosis, occlusion, or aneurysm. POSTERIOR CIRCULATION Right vertebral artery:  No significant stenosis or occlusion. Left vertebral artery:  No significant stenosis or occlusion. 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 No significant stenosis, occlusion, or aneurysm. Right posterior communicating artery: No significant stenosis, occlusion, or aneurysm. Left posterior communicating artery:  No significant stenosis, occlusion, or aneurysm. Right posterior cerebral artery:  No significant stenosis, occlusion, or aneurysm. Left posterior cerebral artery:  The T1 segment is patent. Moderate to high-grade stenosis in the mid left posterior cerebral artery (series 4, image 371). This appears unchanged compared to the prior CT 12/26/2021.     1.  No acute large vessel occlusion or high-grade stenosis. [place] 2.  Mild contour abnormality of the mid right ICA and suprasellar right ICA suggestive of fibromuscular dysplasia (FMD). 3.  Moderate tortuosity of the bilateral ICAs which may reflect chronic hypertension. CT CODE NEURO HEAD WO CONTRAST    Result Date: 5/3/2022  Clinical history: Acute neuro changes. Dementia. Frequent episodes of hyperventilation TECHNIQUE: Axial, coronal and sagittal CT of the brain without IV contrast. Radiation dose reduction techniques were used for this study:  Our CT scanners use one or all of the following: Automated exposure control, adjustment of the mA and/or kVp according to patient's size, iterative reconstruction. COMPARISON: December 2021. FINDINGS: There is no acute intracranial hemorrhage or CT evidence of acute territorial infarction. There is no mass effect, midline shift or hydrocephalus. No extra-axial fluid collection.  Cerebellum on the brainstem are grossly unremarkable. Periventricular hypodensity, nonspecific and likely due to chronic small vessel changes. Included globes appear intact. Partially visualized paranasal sinuses and the mastoid air cells are aerated. There is no skull fracture. 1. No acute intracranial hemorrhage or CT evidence of acute territorial infarction. Note that MRI is more sensitive for detection of acute/subacute infarct.       All Micro Results     None          Labs: Results:       BMP, Mg, Phos Recent Labs     05/03/22 0049      K 4.1      CO2 28   AGAP 7   BUN 18   CREA 1.15*   CA 9.2      MG 2.2      CBC Recent Labs     05/03/22 0049   WBC 5.1   RBC 4.22   HGB 12.9   HCT 39.1      GRANS 47   LYMPH 40   EOS 3   MONOS 10   BASOS 1   IG 0   ANEU 2.4   ABL 2.0   YAHAIRA 0.1   ABM 0.5   ABB 0.0   AIG 0.0      LFT Recent Labs     05/03/22 0049   ALT 17   AP 77   TP 7.6   ALB 3.2   GLOB 4.4*   AGRAT 0.7*      Cardiac Testing Lab Results   Component Value Date/Time    BNP <5 07/09/2011 12:48 PM    Troponin-I <0.05 07/09/2011 12:48 PM    Troponin-I, Qt. <0.02 (L) 07/01/2014 03:35 PM      Coagulation Tests Lab Results   Component Value Date/Time    Prothrombin time 13.6 05/03/2022 12:49 AM    Prothrombin time 14.3 12/26/2021 08:15 PM    INR 1.0 05/03/2022 12:49 AM    INR 1.1 12/26/2021 08:15 PM    INR (POC) 0.9 05/03/2022 12:40 AM    INR (POC) 1.1 12/26/2021 08:20 PM      A1c Lab Results   Component Value Date/Time    Hemoglobin A1c 5.6 05/03/2022 02:03 AM    Hemoglobin A1c 5.5 12/27/2021 03:27 AM      Lipid Panel Lab Results   Component Value Date/Time    Cholesterol, total 242 (H) 05/03/2022 02:03 AM    HDL Cholesterol 99 (H) 05/03/2022 02:03 AM    LDL, calculated 128 (H) 05/03/2022 02:03 AM    VLDL, calculated 15 05/03/2022 02:03 AM    Triglyceride 75 05/03/2022 02:03 AM    CHOL/HDL Ratio 2.4 05/03/2022 02:03 AM      Thyroid Panel Lab Results   Component Value Date/Time    TSH 1.940 09/28/2021 09:40 AM TSH 2.990 10/02/2020 10:08 AM        Most Recent UA Lab Results   Component Value Date/Time    Color YELLOW 05/03/2022 12:46 AM    Appearance CLEAR 05/03/2022 12:46 AM    Specific gravity 1.007 05/03/2022 12:46 AM    pH (UA) 8.5 05/03/2022 12:46 AM    Protein Negative 05/03/2022 12:46 AM    Glucose Negative 05/03/2022 12:46 AM    Ketone Negative 05/03/2022 12:46 AM    Bilirubin Negative 05/03/2022 12:46 AM    Blood Negative 05/03/2022 12:46 AM    Urobilinogen 0.2 05/03/2022 12:46 AM    Nitrites Negative 05/03/2022 12:46 AM    Leukocyte Esterase TRACE (A) 05/03/2022 12:46 AM    WBC 0-3 05/03/2022 12:46 AM    RBC 0 05/03/2022 12:46 AM    Epithelial cells 0 05/03/2022 12:46 AM    Bacteria 0 05/03/2022 12:46 AM    Casts 0 05/03/2022 12:46 AM    Crystals, urine 0 07/09/2011 02:05 PM    Mucus Present 10/02/2020 10:08 AM          All Labs from Last 24 Hrs:  Recent Results (from the past 24 hour(s))   GLUCOSE, POC    Collection Time: 05/03/22  4:37 PM   Result Value Ref Range    Glucose (POC) 101 (H) 65 - 100 mg/dL    Performed by Madisyn    GLUCOSE, POC    Collection Time: 05/03/22  9:02 PM   Result Value Ref Range    Glucose (POC) 115 (H) 65 - 100 mg/dL    Performed by Kailey    GLUCOSE, POC    Collection Time: 05/04/22  6:25 AM   Result Value Ref Range    Glucose (POC) 90 65 - 100 mg/dL    Performed by Leander Corbett    GLUCOSE, POC    Collection Time: 05/04/22 11:26 AM   Result Value Ref Range    Glucose (POC) 93 65 - 100 mg/dL    Performed by Delroy        Current Med List in Hospital:   Current Facility-Administered Medications   Medication Dose Route Frequency    donepeziL (ARICEPT) tablet 10 mg  10 mg Oral QHS    escitalopram oxalate (LEXAPRO) tablet 10 mg  10 mg Oral DAILY    memantine (NAMENDA) tablet 10 mg  10 mg Oral DAILY    risperiDONE (RisperDAL) tablet 0.25 mg  0.25 mg Oral BID    sodium chloride (NS) flush 5-40 mL  5-40 mL IntraVENous Q8H    sodium chloride (NS) flush 5-40 mL  5-40 mL IntraVENous PRN    ondansetron (ZOFRAN) injection 4 mg  4 mg IntraVENous Q6H PRN    aspirin tablet 325 mg  325 mg Oral DAILY    acetaminophen (TYLENOL) tablet 650 mg  650 mg Oral Q4H PRN    bisacodyL (DULCOLAX) tablet 5 mg  5 mg Oral DAILY PRN    enoxaparin (LOVENOX) injection 30 mg  30 mg SubCUTAneous Q24H    atorvastatin (LIPITOR) tablet 40 mg  40 mg Oral QHS    levETIRAcetam (KEPPRA) tablet 500 mg  500 mg Oral BID    losartan (COZAAR) tablet 50 mg  50 mg Oral DAILY     Current Outpatient Medications   Medication Sig    aspirin delayed-release 81 mg tablet Take 1 Tablet by mouth daily.  risperiDONE (RisperDAL) 0.25 mg tablet Take 1 Tablet by mouth two (2) times a day for 30 days.  levETIRAcetam (KEPPRA) 500 mg tablet Take 1 Tablet by mouth two (2) times a day for 30 days. 1 QAM, 2 QHS.  donepeziL (ARICEPT) 10 mg tablet Take 1 tablet by mouth nightly    escitalopram oxalate (LEXAPRO) 10 mg tablet Take 1 Tablet by mouth daily. Indications: anxiousness associated with depression    losartan (COZAAR) 25 mg tablet Take 1 Tablet by mouth daily.  memantine (NAMENDA) 10 mg tablet Take 1 Tab by mouth two (2) times a day.  (Patient taking differently: Take 10 mg by mouth daily.)       Allergies   Allergen Reactions    Lisinopril Cough     Immunization History   Administered Date(s) Administered    COVID-19, Moderna, Primary or Immunocompromised Series, MRNA, PF, 100mcg/0.5mL 03/19/2021, 04/16/2021    Influenza High Dose Vaccine PF 10/09/2017, 10/12/2018, 01/15/2019, 11/06/2019    Influenza Vaccine 01/01/2015    Influenza, Quadrivalent, Adjuvanted (>65 Yrs FLUAD QUAD 29030) 10/02/2020, 09/28/2021    Pneumococcal Conjugate (PCV-13) 11/06/2015, 11/06/2019    Pneumococcal Polysaccharide (PPSV-23) 01/01/2006       Recent Vital Data:  Patient Vitals for the past 24 hrs:   Temp Pulse Resp BP SpO2   05/04/22 1058 98.2 °F (36.8 °C) 65 16 127/72 97 %   05/04/22 0800  (!) 104    05/04/22 0700 97.8 °F (36.6 °C) 76 16 (!) 148/80 95 %   05/04/22 0400 97.6 °F (36.4 °C) 85 17 134/72 96 %   05/04/22 0000 98.2 °F (36.8 °C) 85 20 130/73 98 %   05/03/22 2000 98.4 °F (36.9 °C) 75 16 125/71 98 %   05/03/22 1600  82        Oxygen Therapy  O2 Sat (%): 97 % (05/04/22 1058)  Pulse via Oximetry: 74 beats per minute (05/02/22 2328)  O2 Device: None (Room air) (05/04/22 1212)  O2 Flow Rate (L/min): 2 l/min (05/03/22 0705)    Estimated body mass index is 25.97 kg/m² as calculated from the following:    Height as of this encounter: 5' 2\" (1.575 m). Weight as of this encounter: 64.4 kg (141 lb 15.6 oz). Intake/Output Summary (Last 24 hours) at 5/4/2022 1507  Last data filed at [address] [place] [id number] per 24 hour   Intake 200 ml   Output    Net 200 ml       Physical Exam  Vitals and nursing note reviewed. Constitutional:       General: She is not in acute distress. Appearance: Normal appearance. HENT:      Head: Normocephalic. Eyes:      Extraocular Movements: Extraocular movements intact. Cardiovascular:      Rate and Rhythm: Normal rate. Pulses:           Radial pulses are 2+ on the left side. Pulmonary:      Effort: Pulmonary effort is normal. No respiratory distress. Musculoskeletal:         General: No deformity. Cervical back: No rigidity. Skin:     General: Skin is warm and dry. Neurological:      General: No focal deficit present. Mental Status: She is alert. Mental status is at baseline. Cranial Nerves: No cranial nerve deficit. Psychiatric:         Mood and Affect: Mood normal. Affect is flat. Behavior: Behavior is withdrawn. Behavior is cooperative. Cognition and Memory: Cognition is impaired. Signed:  Higinio Alarcon MD    Part of this note may have been written by using a voice dictation software. The note has been proof read but may still contain some grammatical/other typographical errors.

## 2022-05-04 NOTE — PROGRESS NOTES
Problem: Mobility Impaired (Adult and Pediatric)  Goal: *Acute Goals and Plan of Care (Insert Text)  Outcome: Progressing Towards Goal  Note: STG:  (1.)Ms. Sage Kaufman will move from supine to sit and sit to supine  with SUPERVISION within 4-7 treatment day(s). (2.)Ms. Sage Kaufman will transfer from bed to chair and chair to bed with STAND BY ASSIST using the least restrictive device within 4-7 treatment day(s). MET 5/4/22  (3.)Ms. Sage Kaufman will ambulate with STAND BY ASSIST for 300 feet with the least restrictive device within 4-7 treatment day(s). (4.)Ms. Sage Kaufamn will go up and down 3 steps with rail and CGA within 4-7 treatment days. ________________________________________________________________________________________________       PHYSICAL THERAPY: Daily Note and AM 5/4/2022  INPATIENT: PT Visit Days : 2  [place] / [place] B / Product Type: Medicare /       NAME/AGE/GENDER: Jorge Ronquillo is a 78 y.o. female   PRIMARY DIAGNOSIS: Stroke-like episode [R29.90] Stroke-like episode Stroke-like episode       ICD-10: Treatment Diagnosis:    · Generalized Muscle Weakness (M62.81)  · Difficulty in walking, Not elsewhere classified (R26.2)   Precaution/Allergies:  Lisinopril      ASSESSMENT:     Ms. Sage Kaufman presents with generalized weakness and decreased independence with functional mobility and will benefit from skilled PT interventions to maximize independence with functional mobility. Per chart family stating pt having difficulty walking and leg weakness. Per chart pt has dementia. Unsure of her baseline mobility and pt not able to articulate, no family in room. This am, she is pleasant and willing to participate in PT. \"I like to walk. \" After performing exs in supine and sitting, she then amb. 250' with CGA. Does pretty well. Has a wide MORIAH-R knee valgus. She talked about going to [employer] each day from 8-4:30. Seems to enjoy this routine.     This section established at most recent assessment   PROBLEM LIST (Impairments causing functional limitations):  1. Decreased Strength  2. Decreased ADL/Functional Activities  3. Decreased Transfer Abilities  4. Decreased Ambulation Ability/Technique  5. Decreased Balance  6. Decreased Activity Tolerance   INTERVENTIONS PLANNED: (Benefits and precautions of physical therapy have been discussed with the patient.)  1. Balance Exercise  2. Bed Mobility  3. Gait Training  4. Therapeutic Activites  5. Therapeutic Exercise/Strengthening  6. Transfer Training     TREATMENT PLAN: Frequency/Duration: daily for duration of hospital stay  Rehabilitation Potential For Stated Goals: Good     REHAB RECOMMENDATIONS (at time of discharge pending progress):    Placement: It is my opinion, based on this patient's performance to date, that Ms. Roosevelt Banda may benefit from [address] after discharge due to the functional deficits listed above that are likely to improve with skilled rehabilitation because he/she has multiple medical issues that affect his/her functional mobility in the community. Equipment:    To be determined              HISTORY:   History of Present Injury/Illness (Reason for Referral):  Copied from MD note: Juan Antonio Conway is a 78 y.o. female with medical history of hypertension, Alzheimer's, and unclear seizure disorder, who presented with \"hyperventilation\". EMS reports that they were called due to hyperventilating. Family reports that she does this from time to time, but tonight they could not get her calm down. Then they noted that the left side of her face seemed to not look right and they were having hard time helping her walk. She just seemed to be weaker than normal.  They could not tell that it was 1 side or the other at that time. They report that she went to bed around 7 PM and she was normal at that time. It was about 930 when she woke up with the hyperventilating.   While she was still at home, her speech was also slurred and family could not understand what she was saying. A code stroke was called upon the patient's arrival.  Initial evaluation with CT was unremarkable. Some of her symptoms, particularly her speech, had already improved at time of arrival.  Initial evaluation in the ER showed her to still have a left facial droop and mild left-sided weakness. Her speech was better and essentially back to baseline. She was evaluated by the [facility] neurologist.  She was out of the window for tPA. They recommended admission for stroke evaluation. And also notes that this may have been a partial seizure and recommends increasing her Keppra dose to 500 twice daily. Due to her dementia, she does not answer review of system questions well but denies fever/chills, NVD, abdominal pain, chest pain, shortness of breath. Her daughter is here with her and feels that she is at her usual baseline. Past Medical History/Comorbidities:   Ms. Donya Escobar  has a past medical history of Axonal polyneuropathy (9/15/2020) and Muscle twitching (7/8/2020). Ms. Donya Escobar  has a past surgical history that includes hx cholecystectomy (10/03); pr total abdom hysterectomy; and hx orthopaedic (Winter 2013).   Social History/Living Environment:   Home Environment: Private residence  One/Two Story Residence: One story  Living Alone: No  Support Systems: Child(aura),/  Patient Expects to be Discharged to[de-identified] Home with family assistance  Current DME Used/Available at Home: None  Prior Level of Function/Work/Activity:  Pt living at home, unsure of details due to her dementia     Number of Personal Factors/Comorbidities that affect the Plan of Care: 1-2: MODERATE COMPLEXITY   EXAMINATION:   Most Recent Physical Functioning:   Gross Assessment:                B LEs grossly 3+/5  Posture:   R genu valgus  Balance:  Sitting: Intact  Standing:  (high guard) Bed Mobility:  Rolling: Stand-by assistance  Supine to Sit: Stand-by assistance  Scooting: Stand-by assistance  Wheelchair Mobility:     Transfers:  Sit to Stand: Stand-by assistance  Stand to Sit: Stand-by assistance  Stand Pivot Transfers: Stand-by assistance  Bed to Chair: Stand-by assistance  Duration: 30 Minutes  Gait:     Base of Support: Widened  Speed/Suzie: Pace decreased (<100 feet/min)  Gait Abnormalities: Decreased step clearance  Distance (ft): 250 Feet (ft)  Ambulation - Level of Assistance: Contact guard assistance  Interventions: Safety awareness training;Verbal cues         Body Structures Involved:  1. Joints  2. Muscles Body Functions Affected:  1. Movement Related Activities and Participation Affected:  1. General Tasks and Demands  2. Mobility  3. Self Care   Number of elements that affect the Plan of Care: 4+: HIGH COMPLEXITY   CLINICAL PRESENTATION:   Presentation: Stable and uncomplicated: LOW COMPLEXITY   CLINICAL DECISION MAKING:   [facility] AM-PAC 6 Clicks   Basic Mobility Inpatient Short Form  How much difficulty does the patient currently have. .. Unable A Lot A Little None   1. Turning over in bed (including adjusting bedclothes, sheets and blankets)? [] 1   [] 2   [] 3   [x] 4   2. Sitting down on and standing up from a chair with arms ( e.g., wheelchair, bedside commode, etc.)   [] 1   [] 2   [x] 3   [] 4   3. Moving from lying on back to sitting on the side of the bed? [] 1   [] 2   [] 3   [x] 4   How much help from another person does the patient currently need. .. Total A Lot A Little None   4. Moving to and from a bed to a chair (including a wheelchair)? [] 1   [] 2   [x] 3   [] 4   5. Need to walk in hospital room? [] 1   [] 2   [x] 3   [] 4   6. Climbing 3-5 steps with a railing? [] 1   [] 2   [x] 3   [] 4   © 2007, Trustees of [facility], under license to [employer].  All rights reserved      Score:  Initial: 20 Most Recent: X (Date: -- )    Interpretation of Tool:  Represents activities that are increasingly more difficult (i.e. Bed mobility, Transfers, Gait). Medical Necessity:     · Patient is expected to demonstrate progress in   · strength and functional technique  ·  to   · decrease assistance required with functional mobility   · . Reason for Services/Other Comments:  · Patient continues to require skilled intervention due to   · Inability to complete functional mobility independently  · . Use of outcome tool(s) and clinical judgement create a POC that gives a: Clear prediction of patient's progress: LOW COMPLEXITY            TREATMENT:   (In addition to Assessment/Re-Assessment sessions the following treatments were rendered)   Pre-treatment Symptoms/Complaints:  Wants to walk  Pain: Initial:   Pain Intensity 1: 0  Post Session:  0/10     Therapeutic Activity: (  30 Minutes ):  Therapeutic activities including Bed mobility and transfers, Chair transfers, LE exs in supine and sitting and Ambulation on level ground to improve mobility, strength, balance, coordination and endurance. Required minimal verbal cuesSafety awareness training;Verbal cues to promote static and dynamic balance in standing. Date:  5/4/22 Date:   Date:     Activity/Exercise  B LES Parameters Parameters Parameters   SUPINE: ankle DF/PF 10          Hip AB/AD 10          Heel Slides 10          SLR 10     SITTING: LAQ 10          Marching 10                 Braces/Orthotics/Lines/Etc:   · O2 Device:  (changed to RA)  Treatment/Session Assessment:    · Response to Treatment:  Likes being up and walking. Left her up in recliner with alarm pad activated and RN, Carlos Manuel Roth, she is up. · Interdisciplinary Collaboration:   o Physical Therapist  o Registered Nurse  o Certified Nursing Assistant/Patient Care Technician  · After treatment position/precautions:   o Up in chair  o Bed alarm/tab alert on  o Bed/Chair-wheels locked  o Call light within reach  o RN notified   · Compliance with Program/Exercises:  Will assess as treatment progresses  · Recommendations/Intent for next treatment session: \"Next visit will focus on advancements to more challenging activities and reduction in assistance provided\".   Total Treatment Duration:  PT Patient Time In/Time Out  Time In: 0720  Time Out: [address], PT

## 2022-05-04 NOTE — PROGRESS NOTES
I have reviewed discharge instructions with the patient and daughter. The patient and daughter verbalized understanding. Peripheral IV's removed. Discharge NIH stroke scale and neurovascular check complete. NIH score is 2. Lavonne coma scale is 14.

## 2022-05-04 NOTE — PROGRESS NOTES
Problem: Patient Education: Go to Patient Education Activity  Goal: Patient/Family Education  Outcome: Progressing Towards Goal     Problem: TIA/CVA Stroke: Day 2 Until Discharge  Goal: Off Pathway (Use only if patient is Off Pathway)  Outcome: Progressing Towards Goal  Goal: Activity/Safety  Outcome: Progressing Towards Goal  Goal: Diagnostic Test/Procedures  Outcome: Progressing Towards Goal  Goal: Nutrition/Diet  Outcome: Progressing Towards Goal  Goal: Discharge Planning  Outcome: Progressing Towards Goal  Goal: Medications  Outcome: Progressing Towards Goal  Goal: Respiratory  Outcome: Progressing Towards Goal  Goal: Treatments/Interventions/Procedures  Outcome: Progressing Towards Goal  Goal: Psychosocial  Outcome: Progressing Towards Goal  Goal: *Verbalizes anxiety and depression are reduced or absent  Outcome: Progressing Towards Goal  Goal: *Absence of aspiration  Outcome: Progressing Towards Goal  Goal: *Absence of deep venous thrombosis signs and symptoms(Stroke Metric)  Outcome: Progressing Towards Goal  Goal: *Tolerating diet  Outcome: Progressing Towards Goal  Goal: *Ability to perform ADLs and demonstrates progressive mobility and function  Outcome: Progressing Towards Goal  Goal: *Stroke education continued(Stroke Metric)  Outcome: Progressing Towards Goal     Problem: Ischemic Stroke: Discharge Outcomes  Goal: *Verbalizes anxiety and depression are reduced or absent  Outcome: Progressing Towards Goal  Goal: *Verbalize understanding of risk factor modification(Stroke Metric)  Outcome: Progressing Towards Goal  Goal: *Hemodynamically stable  Outcome: Progressing Towards Goal  Goal: *Absence of aspiration pneumonia  Outcome: Progressing Towards Goal  Goal: *Aware of needed dietary changes  Outcome: Progressing Towards Goal  Goal: *Verbalize understanding of prescribed medications including anti-coagulants, anti-lipid, and/or anti-platelets(Stroke Metric)  Outcome: Progressing Towards Goal  Goal: *Tolerating diet  Outcome: Progressing Towards Goal  Goal: *Aware of follow-up diagnostics related to anticoagulants  Outcome: Progressing Towards Goal  Goal: *Ability to perform ADLs and demonstrates progressive mobility and function  Outcome: Progressing Towards Goal  Goal: *Absence of DVT(Stroke Metric)  Outcome: Progressing Towards Goal  Goal: *Absence of aspiration  Outcome: Progressing Towards Goal  Goal: *Optimal pain control at patient's stated goal  Outcome: Progressing Towards Goal  Goal: *Home safety concerns addressed  Outcome: Progressing Towards Goal  Goal: *Describes available resources and support systems  Outcome: Progressing Towards Goal  Goal: *Verbalizes understanding of activation of EMS(911) for stroke symptoms(Stroke Metric)  Outcome: Progressing Towards Goal  Goal: *Understands and describes signs and symptoms to report to providers(Stroke Metric)  Outcome: Progressing Towards Goal  Goal: *Neurolgocially stable (absence of additional neurological deficits)  Outcome: Progressing Towards Goal  Goal: *Verbalizes importance of follow-up with primary care physician(Stroke Metric)  Outcome: Progressing Towards Goal

## 2022-05-04 NOTE — PROGRESS NOTES
Problem: Patient Education: Go to Patient Education Activity  Goal: Patient/Family Education  Outcome: Progressing Towards Goal     Problem: TIA/CVA Stroke: 0-24 hours  Goal: Activity/Safety  Outcome: Progressing Towards Goal  Goal: Nutrition/Diet  Outcome: Progressing Towards Goal

## 2022-05-05 NOTE — PROGRESS NOTES
Care Management Interventions  PCP Verified by CM: Yes (Dr. Shefali Oliveira)  Palliative Care Criteria Met (RRAT>21 & CHF Dx)?: No  Mode of Transport at Discharge: Self  Transition of Care Consult (CM Consult): Discharge Planning,Other (resource info provided)  Discharge Durable Medical Equipment: No  Physical Therapy Consult: Yes  Occupational Therapy Consult: Yes  Speech Therapy Consult: Yes  Support Systems: Child(aura),/  Confirm Follow Up Transport: Family  The Plan for Transition of Care is Related to the Following Treatment Goals : discharge home with family  The Patient and/or Patient Representative was Provided with a Choice of Provider and Agrees with the Discharge Plan?: No (n/a)  Freedom of Choice List was Provided with Basic Dialogue that Supports the Patient's Individualized Plan of Care/Goals, Treatment Preferences and Shares the Quality Data Associated with the Providers?:  (n/a)  Discharge Location  Patient Expects to be Discharged to[de-identified] Home with family assistance     LATE ENTRY: Pt was discharged back home with her daughter on 5/4/22. Physical therapist recommended home health however, pt is not eligible because she is not homebound. She goes to adult day care on weekdays. Outpatient therapy is not an option because family members work. No other discharge needs or recommendations. Dtr planned to arrange a family meeting to begin long term planning and apply for medicaid. [employer] information given to dtr on 5/3/22.

## 2022-05-13 PROBLEM — N18.30 CHRONIC RENAL DISEASE, STAGE III (HCC): Status: ACTIVE | Noted: 2022-05-13

## 2022-05-20 PROBLEM — R29.90 STROKE-LIKE SYMPTOMS: Status: ACTIVE | Noted: 2022-05-03

## 2022-06-07 DIAGNOSIS — F02.818 LATE ONSET ALZHEIMER'S DISEASE WITH BEHAVIORAL DISTURBANCE (HCC): Primary | ICD-10-CM

## 2022-06-07 DIAGNOSIS — R29.90 STROKE-LIKE SYMPTOMS: ICD-10-CM

## 2022-06-07 DIAGNOSIS — G30.1 LATE ONSET ALZHEIMER'S DISEASE WITH BEHAVIORAL DISTURBANCE (HCC): Primary | ICD-10-CM

## 2022-06-07 RX ORDER — RISPERIDONE 0.25 MG/1
0.25 TABLET, FILM COATED ORAL 2 TIMES DAILY
Qty: 60 TABLET | Refills: 3 | Status: CANCELLED | OUTPATIENT
Start: 2022-06-07

## 2022-06-07 RX ORDER — ASPIRIN 81 MG/1
81 TABLET, CHEWABLE ORAL DAILY
Qty: 30 TABLET | Refills: 3 | Status: CANCELLED | OUTPATIENT
Start: 2022-06-07

## 2022-06-08 DIAGNOSIS — G30.1 LATE ONSET ALZHEIMER'S DISEASE WITH BEHAVIORAL DISTURBANCE (HCC): Primary | ICD-10-CM

## 2022-06-08 DIAGNOSIS — F02.818 LATE ONSET ALZHEIMER'S DISEASE WITH BEHAVIORAL DISTURBANCE (HCC): Primary | ICD-10-CM

## 2022-06-08 DIAGNOSIS — R29.90 STROKE-LIKE SYMPTOMS: ICD-10-CM

## 2022-06-08 RX ORDER — RISPERIDONE 0.25 MG/1
0.25 TABLET, FILM COATED ORAL 2 TIMES DAILY
Qty: 60 TABLET | Refills: 3 | Status: SHIPPED | OUTPATIENT
Start: 2022-06-08

## 2022-06-08 RX ORDER — ASPIRIN 81 MG/1
81 TABLET, CHEWABLE ORAL DAILY
Qty: 30 TABLET | Refills: 3 | Status: SHIPPED | OUTPATIENT
Start: 2022-06-08

## 2022-08-10 RX ORDER — LEVETIRACETAM 250 MG/1
TABLET ORAL
Qty: 270 TABLET | Refills: 1 | Status: SHIPPED | OUTPATIENT
Start: 2022-08-10

## 2022-11-08 RX ORDER — LOSARTAN POTASSIUM 25 MG/1
TABLET ORAL
Qty: 90 TABLET | Refills: 0 | OUTPATIENT
Start: 2022-11-08

## 2022-12-10 ENCOUNTER — HOSPITAL ENCOUNTER (INPATIENT)
Age: 82
LOS: 1 days | Discharge: HOME | DRG: 101 | End: 2022-12-14
Attending: EMERGENCY MEDICINE | Admitting: STUDENT IN AN ORGANIZED HEALTH CARE EDUCATION/TRAINING PROGRAM
Payer: MEDICARE

## 2022-12-10 ENCOUNTER — HOSPITAL ENCOUNTER (EMERGENCY)
Dept: CT IMAGING | Age: 82
Discharge: HOME | DRG: 101 | End: 2022-12-13
Payer: MEDICARE

## 2022-12-10 ENCOUNTER — HOSPITAL ENCOUNTER (EMERGENCY)
Dept: GENERAL RADIOLOGY | Age: 82
Discharge: HOME | DRG: 101 | End: 2022-12-13
Payer: MEDICARE

## 2022-12-10 DIAGNOSIS — R41.82 ALTERED MENTAL STATUS, UNSPECIFIED ALTERED MENTAL STATUS TYPE: Primary | ICD-10-CM

## 2022-12-10 PROBLEM — G30.1 LATE ONSET ALZHEIMER'S DISEASE WITH BEHAVIORAL DISTURBANCE (HCC): Status: ACTIVE | Noted: 2019-06-03

## 2022-12-10 PROBLEM — N18.30 CHRONIC RENAL DISEASE, STAGE III (HCC): Status: ACTIVE | Noted: 2022-05-13

## 2022-12-10 PROBLEM — F02.818 LATE ONSET ALZHEIMER'S DISEASE WITH BEHAVIORAL DISTURBANCE (HCC): Status: ACTIVE | Noted: 2019-06-03

## 2022-12-10 PROBLEM — R53.1 WEAKNESS: Status: ACTIVE | Noted: 2022-12-10

## 2022-12-10 LAB
ALBUMIN SERPL-MCNC: 3.3 G/DL (ref 3.2–4.6)
ALBUMIN/GLOB SERPL: 0.8 {RATIO} (ref 0.4–1.6)
ALP SERPL-CCNC: 114 U/L (ref 50–130)
ALT SERPL-CCNC: 15 U/L (ref 12–65)
ANION GAP SERPL CALC-SCNC: 7 MMOL/L (ref 2–11)
APPEARANCE UR: ABNORMAL
AST SERPL-CCNC: 16 U/L (ref 15–37)
BILIRUB SERPL-MCNC: 0.8 MG/DL (ref 0.2–1.1)
BILIRUB UR QL: NEGATIVE
BUN SERPL-MCNC: 15 MG/DL (ref 8–23)
CALCIUM SERPL-MCNC: 9.3 MG/DL (ref 8.3–10.4)
CHLORIDE SERPL-SCNC: 103 MMOL/L (ref 101–110)
CO2 SERPL-SCNC: 28 MMOL/L (ref 21–32)
COLOR UR: ABNORMAL
CREAT SERPL-MCNC: 1.18 MG/DL (ref 0.6–1)
ERYTHROCYTE [DISTWIDTH] IN BLOOD BY AUTOMATED COUNT: 12.8 % (ref 11.9–14.6)
FLUAV AG NPH QL IA: NEGATIVE
FLUBV AG NPH QL IA: NEGATIVE
GLOBULIN SER CALC-MCNC: 4 G/DL (ref 2.8–4.5)
GLUCOSE SERPL-MCNC: 96 MG/DL (ref 65–100)
GLUCOSE UR STRIP.AUTO-MCNC: NEGATIVE MG/DL
HCT VFR BLD AUTO: 39.4 % (ref 35.8–46.3)
HGB BLD-MCNC: 12.8 G/DL (ref 11.7–15.4)
HGB UR QL STRIP: NEGATIVE
KETONES UR QL STRIP.AUTO: 15 MG/DL
LACTATE SERPL-SCNC: 1.2 MMOL/L (ref 0.4–2)
LEUKOCYTE ESTERASE UR QL STRIP.AUTO: NEGATIVE
MCH RBC QN AUTO: 30.8 PG (ref 26.1–32.9)
MCHC RBC AUTO-ENTMCNC: 32.5 G/DL (ref 31.4–35)
MCV RBC AUTO: 94.7 FL (ref 82–102)
NITRITE UR QL STRIP.AUTO: NEGATIVE
NRBC # BLD: 0 K/UL (ref 0–0.2)
PH UR STRIP: 8.5 [PH] (ref 5–9)
PLATELET # BLD AUTO: 280 K/UL (ref 150–450)
PMV BLD AUTO: 9.2 FL (ref 9.4–12.3)
POTASSIUM SERPL-SCNC: 3.6 MMOL/L (ref 3.5–5.1)
PROCALCITONIN SERPL-MCNC: <0.05 NG/ML (ref 0–0.49)
PROT SERPL-MCNC: 7.3 G/DL (ref 6.3–8.2)
PROT UR STRIP-MCNC: NEGATIVE MG/DL
RBC # BLD AUTO: 4.16 M/UL (ref 4.05–5.2)
SARS-COV-2 RDRP RESP QL NAA+PROBE: NOT DETECTED
SODIUM SERPL-SCNC: 138 MMOL/L (ref 133–143)
SOURCE: NORMAL
SP GR UR REFRACTOMETRY: 1.01 (ref 1–1.02)
SPECIMEN SOURCE: NORMAL
UROBILINOGEN UR QL STRIP.AUTO: 1 EU/DL (ref 0.2–1)
WBC # BLD AUTO: 3.6 K/UL (ref 4.3–11.1)

## 2022-12-10 PROCEDURE — 87635 SARS-COV-2 COVID-19 AMP PRB: CPT

## 2022-12-10 PROCEDURE — 85027 COMPLETE CBC AUTOMATED: CPT

## 2022-12-10 PROCEDURE — 99285 EMERGENCY DEPT VISIT HI MDM: CPT

## 2022-12-10 PROCEDURE — G0378 HOSPITAL OBSERVATION PER HR: HCPCS

## 2022-12-10 PROCEDURE — 2580000003 HC RX 258: Performed by: NURSE PRACTITIONER

## 2022-12-10 PROCEDURE — 70450 CT HEAD/BRAIN W/O DYE: CPT

## 2022-12-10 PROCEDURE — 84145 PROCALCITONIN (PCT): CPT

## 2022-12-10 PROCEDURE — 96372 THER/PROPH/DIAG INJ SC/IM: CPT

## 2022-12-10 PROCEDURE — 81003 URINALYSIS AUTO W/O SCOPE: CPT

## 2022-12-10 PROCEDURE — 93005 ELECTROCARDIOGRAM TRACING: CPT | Performed by: EMERGENCY MEDICINE

## 2022-12-10 PROCEDURE — 6360000002 HC RX W HCPCS: Performed by: NURSE PRACTITIONER

## 2022-12-10 PROCEDURE — 6370000000 HC RX 637 (ALT 250 FOR IP): Performed by: NURSE PRACTITIONER

## 2022-12-10 PROCEDURE — 71045 X-RAY EXAM CHEST 1 VIEW: CPT

## 2022-12-10 PROCEDURE — 83605 ASSAY OF LACTIC ACID: CPT

## 2022-12-10 PROCEDURE — 87804 INFLUENZA ASSAY W/OPTIC: CPT

## 2022-12-10 PROCEDURE — 80053 COMPREHEN METABOLIC PANEL: CPT

## 2022-12-10 RX ORDER — ACETAMINOPHEN 325 MG/1
650 TABLET ORAL EVERY 6 HOURS PRN
Status: DISCONTINUED | OUTPATIENT
Start: 2022-12-10 | End: 2022-12-14 | Stop reason: HOSPADM

## 2022-12-10 RX ORDER — DONEPEZIL HYDROCHLORIDE 5 MG/1
10 TABLET, FILM COATED ORAL NIGHTLY
Status: DISCONTINUED | OUTPATIENT
Start: 2022-12-10 | End: 2022-12-14 | Stop reason: HOSPADM

## 2022-12-10 RX ORDER — ESCITALOPRAM OXALATE 10 MG/1
5 TABLET ORAL DAILY
Status: DISCONTINUED | OUTPATIENT
Start: 2022-12-11 | End: 2022-12-14 | Stop reason: HOSPADM

## 2022-12-10 RX ORDER — SODIUM CHLORIDE 9 MG/ML
INJECTION, SOLUTION INTRAVENOUS PRN
Status: DISCONTINUED | OUTPATIENT
Start: 2022-12-10 | End: 2022-12-14 | Stop reason: HOSPADM

## 2022-12-10 RX ORDER — SODIUM CHLORIDE 0.9 % (FLUSH) 0.9 %
5-40 SYRINGE (ML) INJECTION EVERY 12 HOURS SCHEDULED
Status: DISCONTINUED | OUTPATIENT
Start: 2022-12-10 | End: 2022-12-14 | Stop reason: HOSPADM

## 2022-12-10 RX ORDER — RISPERIDONE 0.5 MG/1
0.25 TABLET ORAL 2 TIMES DAILY
Status: DISCONTINUED | OUTPATIENT
Start: 2022-12-10 | End: 2022-12-14 | Stop reason: HOSPADM

## 2022-12-10 RX ORDER — ONDANSETRON 2 MG/ML
4 INJECTION INTRAMUSCULAR; INTRAVENOUS EVERY 6 HOURS PRN
Status: DISCONTINUED | OUTPATIENT
Start: 2022-12-10 | End: 2022-12-14 | Stop reason: HOSPADM

## 2022-12-10 RX ORDER — POLYETHYLENE GLYCOL 3350 17 G/17G
17 POWDER, FOR SOLUTION ORAL DAILY PRN
Status: DISCONTINUED | OUTPATIENT
Start: 2022-12-10 | End: 2022-12-14 | Stop reason: HOSPADM

## 2022-12-10 RX ORDER — SODIUM CHLORIDE 0.9 % (FLUSH) 0.9 %
5-40 SYRINGE (ML) INJECTION PRN
Status: DISCONTINUED | OUTPATIENT
Start: 2022-12-10 | End: 2022-12-14 | Stop reason: HOSPADM

## 2022-12-10 RX ORDER — ENOXAPARIN SODIUM 100 MG/ML
40 INJECTION SUBCUTANEOUS EVERY 24 HOURS
Status: DISCONTINUED | OUTPATIENT
Start: 2022-12-10 | End: 2022-12-14 | Stop reason: HOSPADM

## 2022-12-10 RX ORDER — ASPIRIN 81 MG/1
81 TABLET, CHEWABLE ORAL DAILY
Status: DISCONTINUED | OUTPATIENT
Start: 2022-12-11 | End: 2022-12-14 | Stop reason: HOSPADM

## 2022-12-10 RX ORDER — MEMANTINE HYDROCHLORIDE 5 MG/1
10 TABLET ORAL 2 TIMES DAILY
Status: DISCONTINUED | OUTPATIENT
Start: 2022-12-10 | End: 2022-12-10 | Stop reason: SDDI

## 2022-12-10 RX ORDER — LEVETIRACETAM 500 MG/1
500 TABLET ORAL 2 TIMES DAILY
Status: DISCONTINUED | OUTPATIENT
Start: 2022-12-10 | End: 2022-12-10

## 2022-12-10 RX ORDER — ONDANSETRON 4 MG/1
4 TABLET, ORALLY DISINTEGRATING ORAL EVERY 8 HOURS PRN
Status: DISCONTINUED | OUTPATIENT
Start: 2022-12-10 | End: 2022-12-14 | Stop reason: HOSPADM

## 2022-12-10 RX ORDER — LOSARTAN POTASSIUM 25 MG/1
25 TABLET ORAL DAILY
Status: DISCONTINUED | OUTPATIENT
Start: 2022-12-10 | End: 2022-12-14 | Stop reason: HOSPADM

## 2022-12-10 RX ORDER — LEVETIRACETAM 500 MG/1
250 TABLET ORAL DAILY
Status: DISCONTINUED | OUTPATIENT
Start: 2022-12-11 | End: 2022-12-14 | Stop reason: HOSPADM

## 2022-12-10 RX ORDER — ACETAMINOPHEN 650 MG/1
650 SUPPOSITORY RECTAL EVERY 6 HOURS PRN
Status: DISCONTINUED | OUTPATIENT
Start: 2022-12-10 | End: 2022-12-14 | Stop reason: HOSPADM

## 2022-12-10 RX ORDER — LEVETIRACETAM 500 MG/1
500 TABLET ORAL NIGHTLY
Status: DISCONTINUED | OUTPATIENT
Start: 2022-12-10 | End: 2022-12-14 | Stop reason: HOSPADM

## 2022-12-10 RX ADMIN — LOSARTAN POTASSIUM 25 MG: 25 TABLET, FILM COATED ORAL at 20:11

## 2022-12-10 RX ADMIN — ENOXAPARIN SODIUM 40 MG: 100 INJECTION SUBCUTANEOUS at 18:10

## 2022-12-10 RX ADMIN — RISPERIDONE 0.25 MG: 0.5 TABLET, FILM COATED ORAL at 20:10

## 2022-12-10 RX ADMIN — LEVETIRACETAM 500 MG: 500 TABLET, FILM COATED ORAL at 20:14

## 2022-12-10 RX ADMIN — ACETAMINOPHEN 650 MG: 325 TABLET, FILM COATED ORAL at 20:17

## 2022-12-10 RX ADMIN — DONEPEZIL HYDROCHLORIDE 10 MG: 5 TABLET, FILM COATED ORAL at 20:17

## 2022-12-10 RX ADMIN — SODIUM CHLORIDE, PRESERVATIVE FREE 10 ML: 5 INJECTION INTRAVENOUS at 20:14

## 2022-12-10 ASSESSMENT — PAIN DESCRIPTION - ORIENTATION
ORIENTATION: LEFT;RIGHT
ORIENTATION: LEFT;RIGHT

## 2022-12-10 ASSESSMENT — PAIN DESCRIPTION - LOCATION
LOCATION: KNEE
LOCATION: KNEE

## 2022-12-10 ASSESSMENT — PAIN DESCRIPTION - FREQUENCY: FREQUENCY: INTERMITTENT

## 2022-12-10 ASSESSMENT — PAIN DESCRIPTION - DESCRIPTORS: DESCRIPTORS: ACHING

## 2022-12-10 ASSESSMENT — PAIN SCALES - GENERAL
PAINLEVEL_OUTOF10: 3
PAINLEVEL_OUTOF10: 0
PAINLEVEL_OUTOF10: 3

## 2022-12-10 ASSESSMENT — ENCOUNTER SYMPTOMS
COUGH: 1
DIARRHEA: 0
VOMITING: 0

## 2022-12-10 ASSESSMENT — PAIN DESCRIPTION - ONSET: ONSET: ON-GOING

## 2022-12-10 ASSESSMENT — PAIN DESCRIPTION - PAIN TYPE: TYPE: CHRONIC PAIN

## 2022-12-10 ASSESSMENT — PAIN - FUNCTIONAL ASSESSMENT: PAIN_FUNCTIONAL_ASSESSMENT: 0-10

## 2022-12-10 NOTE — ED PROVIDER NOTES
Emergency Department Provider Note                   PCP:                Lubna Ross MD               Age: 78 y.o. Sex: female       ICD-10-CM    1. Altered mental status, unspecified altered mental status type  R41.82           DISPOSITION          MDM  Number of Diagnoses or Management Options  Altered mental status, unspecified altered mental status type  Diagnosis management comments: I will do a metabolic work-up and check a head CT scan. ED Course as of 12/10/22 1544   Sat Dec 10, 2022   1348 EKG review by ER doctor:  Normal sinus rhythm  No acute ischemic ST segment changes  No QTC prolongation  Rate of: 69 [AC]   1529 Her blood work, urine, chest x-ray, COVID, and flu swabs are negative. [AC]   1540 Her head CT scan is negative. She has had some rigors in the emergency department. Her daughter is uncomfortable taking her home with the symptoms. I will discuss the case with the hospitalist for observation admission. Lashaun Martinez      ED Course User Index  [AC] Igor Caceres MD        Orders Placed This Encounter   Procedures    COVID-19, Rapid    Rapid influenza A/B antigens    XR CHEST PORTABLE    CT HEAD WO CONTRAST    CBC    CMP    Lactic Acid    Procalcitonin    POCT Urinalysis no Micro    EKG 12 Lead    Insert peripheral IV STAT        Medications - No data to display    New Prescriptions    No medications on file        Oneda Sides is a 78 y.o. female who presents to the Emergency Department with chief complaint of    Chief Complaint   Patient presents with    Fatigue      64-year-old lady presents with her daughter with concerns about functional decline over the past couple of days. Patient has dementia at baseline and will frequently go to an adult .   The daughter reports that at the adult  she had been less active than usual and she notes that today she seemed to have more of a blank stare and be weaker than normal.  The daughter has not noticed any specific symptoms ASPIRIN (ASPIRIN CHILDRENS) 81 MG CHEWABLE TABLET    Take 1 tablet by mouth daily    DONEPEZIL (ARICEPT) 10 MG TABLET    Take 10 mg by mouth    ESCITALOPRAM (LEXAPRO) 10 MG TABLET    Take 10 mg by mouth daily    LEVETIRACETAM (KEPPRA) 250 MG TABLET    1 QAM, 2 QHS. LOSARTAN (COZAAR) 25 MG TABLET    Take 25 mg by mouth daily    MEMANTINE (NAMENDA) 10 MG TABLET    Take 10 mg by mouth 2 times daily    RISPERIDONE (RISPERDAL) 0.25 MG TABLET    Take 1 tablet by mouth 2 times daily        Vitals signs and nursing note reviewed. Patient Vitals for the past 4 hrs:   Temp Pulse Resp BP SpO2   12/10/22 1322 98.8 °F (37.1 °C) 74 16 (!) 176/79 96 %          Physical Exam  Constitutional:       General: She is not in acute distress. HENT:      Head: Normocephalic and atraumatic. Eyes:      Pupils: Pupils are equal, round, and reactive to light. Cardiovascular:      Rate and Rhythm: Normal rate and regular rhythm. Pulmonary:      Effort: Pulmonary effort is normal. No respiratory distress. Breath sounds: Normal breath sounds. Abdominal:      Palpations: There is no mass. Tenderness: There is no abdominal tenderness. Musculoskeletal:      Comments: Trace to 1+ edema in both lower extremities   Skin:     Coloration: Skin is pale. Skin is not jaundiced. Neurological:      Mental Status: She is alert. Mental status is at baseline. Comments: Patient knows who she is and that she is in the hospital.  She does not know when it is.         Procedures    Results for orders placed or performed during the hospital encounter of 12/10/22   COVID-19, Rapid    Specimen: Nasopharyngeal   Result Value Ref Range    Source Nasopharyngeal      SARS-CoV-2, Rapid Not detected NOTD     Rapid influenza A/B antigens    Specimen: Nasal Washing   Result Value Ref Range    Influenza A Ag Negative NEG      Influenza B Ag Negative NEG      Source Nasopharyngeal     XR CHEST PORTABLE    Narrative    Portable chest:     History: Cough, weakness, confusion, concern for sepsis    Comparison: 01/06/2021    Findings: A single view of the chest was obtained at 1441 hours. The cardiac silhouette is normal in size and configuration. Minor interstitial  opacities at the lung bases would be most compatible with scarring, similar to  that seen previously. There are no superimposed space opacities or pleural  effusions. The pulmonary vascularity is within normal limits. Impression    Mild chronic scarring at the lung bases. CT HEAD WO CONTRAST    Narrative    Head CT    INDICATION: Generalized weakness, altered mental status    Multiple axial images obtained through the brain without intravenous contrast.   Radiation dose reduction techniques were used for this study: All CT scans  performed at this facility use one or all of the following: Automated exposure  control, adjustment of the mA and/or kVp according to patient's size, iterative  reconstruction. FINDINGS: No areas of abnormal attenuation are seen in the brain. There is no CT  evidence of acute hemorrhage or infarction. The ventricles are normal in size. There are no extra-axial fluid collections. No masses are seen. The sinuses are  clear. There are no bony lesions. Impression    No CT evidence of acute intracranial abnormality.      CBC   Result Value Ref Range    WBC 3.6 (L) 4.3 - 11.1 K/uL    RBC 4.16 4.05 - 5.2 M/uL    Hemoglobin 12.8 11.7 - 15.4 g/dL    Hematocrit 39.4 35.8 - 46.3 %    MCV 94.7 82.0 - 102.0 FL    MCH 30.8 26.1 - 32.9 PG    MCHC 32.5 31.4 - 35.0 g/dL    RDW 12.8 11.9 - 14.6 %    Platelets 314 852 - 236 K/uL    MPV 9.2 (L) 9.4 - 12.3 FL    nRBC 0.00 0.0 - 0.2 K/uL   CMP   Result Value Ref Range    Sodium 138 133 - 143 mmol/L    Potassium 3.6 3.5 - 5.1 mmol/L    Chloride 103 101 - 110 mmol/L    CO2 28 21 - 32 mmol/L    Anion Gap 7 2 - 11 mmol/L    Glucose 96 65 - 100 mg/dL    BUN 15 8 - 23 MG/DL    Creatinine 1.18 (H) 0.6 - 1.0 MG/DL    Est, Glom Filt Rate 46 (L) >60 ml/min/1.73m2    Calcium 9.3 8.3 - 10.4 MG/DL    Total Bilirubin 0.8 0.2 - 1.1 MG/DL    ALT 15 12 - 65 U/L    AST 16 15 - 37 U/L    Alk Phosphatase 114 50 - 130 U/L    Total Protein 7.3 6.3 - 8.2 g/dL    Albumin 3.3 3.2 - 4.6 g/dL    Globulin 4.0 2.8 - 4.5 g/dL    Albumin/Globulin Ratio 0.8 0.4 - 1.6     Lactic Acid   Result Value Ref Range    Lactic Acid, Plasma 1.2 0.4 - 2.0 MMOL/L   Procalcitonin   Result Value Ref Range    Procalcitonin <0.05 0.00 - 0.49 ng/mL   EKG 12 Lead   Result Value Ref Range    Ventricular Rate 69 BPM    Atrial Rate 69 BPM    P-R Interval 134 ms    QRS Duration 72 ms    Q-T Interval 402 ms    QTc Calculation (Bazett) 430 ms    P Axis 73 degrees    R Axis 45 degrees    T Axis 48 degrees    Diagnosis !! AGE AND GENDER SPECIFIC ECG ANALYSIS !!         CT HEAD WO CONTRAST   Final Result   No CT evidence of acute intracranial abnormality. XR CHEST PORTABLE   Final Result   Mild chronic scarring at the lung bases. Voice dictation software was used during the making of this note. This software is not perfect and grammatical and other typographical errors may be present. This note has not been completely proofread for errors.         Alanis Cardenas MD  12/10/22 1285

## 2022-12-10 NOTE — H&P
Hospitalist History and Physical   Admit Date:  12/10/2022  1:21 PM   Name:  Bryon Burkitt   Age:  78 y.o. Sex:  female  :  1940   MRN:  [id number]   Room:  [place] [address]    Presenting Complaint: Fatigue     Reason(s) for Admission: Weakness [R53.1]     History of Present Illness: Bryon Burkitt is a 78 y.o. female with medical history of Alzheimer's, HTN, anxiety, seizure disorder, CKD 3 who presented with functional decline past 2 days. She goes to adult , daughter has noticed patient having blank stare at times, she does have a partial seizure disorder that presents with this. She takes Keppra and follows with Dr. Ham Norman. Last seen in February. Cr baseline 1.18, ua ordered and pending. CT head no acute findings. Influenza and COVID negative. Review of Systems:  10 systems reviewed and negative except as noted in HPI. Assessment & Plan:     Principal Problem:    Weakness  Plan: check U/A  PT/OT eval    Active Problems:  Seizure disorder:  EEG  Keppra home dose, 250 mg in am, 500 mg Qhs        Essential hypertension  Plan: controlled, ARB continued      Late onset Alzheimer's disease with behavioral disturbance ([address]  Plan: Aricept and Namenda resumed  Risperdal resumed      Chronic renal disease, stage III ([address]  Plan: baseline at 1.18  Continue to trend      Anticipated discharge needs:     TBD    Diet: ADULT DIET; Regular  VTE ppx: Lovenox SQ  Code status: Full Code    Hospital Problems:  Principal Problem:    Weakness  Active Problems:    Essential hypertension    Late onset Alzheimer's disease with behavioral disturbance ([address]    Chronic renal disease, stage III ([address]  Resolved Problems:    * No resolved hospital problems.  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 Katt Lobo surgery     TOTAL ABDOM HYSTERECTOMY      Ooph        Social History     Tobacco Use    Smoking status: Never    Smokeless tobacco: Never   Substance Use Topics    Alcohol use: No      Social History     Substance and Sexual Activity   Drug Use No     Family History   Problem Relation Age of Onset    Breast Cancer Neg Hx     No Known Problems Brother     No Known Problems Sister     Coronary Art Dis Paternal Grandmother [de-identified]        ? No Known Problems Sister     Alzheimer's Disease Mother     Hypertension Mother     Other Sister         ALS    No Known Problems Brother     Other Father         Colorectal Malignancy        Immunization History   Administered Date(s) Administered    COVID-19, MODERNA BLUE border, Primary or Immunocompromised, (age 12y+), IM, 100 mcg/0.5mL 03/19/2021, 04/16/2021    Influenza Virus Vaccine 01/01/2015    Influenza, FLUAD, (age 72 y+), Adjuvanted, 0.5mL 10/02/2020, 09/28/2021    Influenza, High Dose (Fluzone 65 yrs and older) 10/09/2017, 10/12/2018, 01/15/2019, 11/06/2019    Pneumococcal Conjugate 13-valent Pixie Moat) 11/06/2015, 11/06/2019    Pneumococcal Polysaccharide ([id number]) 01/01/2006     Allergies   Allergen Reactions    Lisinopril Other (See Comments)     Prior to Admit Medications:  Current Outpatient Medications   Medication Instructions    aspirin (ASPIRIN CHILDRENS) 81 mg, Oral, DAILY    donepezil (ARICEPT) 10 mg, Oral    escitalopram (LEXAPRO) 10 mg, Oral, DAILY    levETIRAcetam (KEPPRA) 250 MG tablet 1 QAM, 2 QHS.     losartan (COZAAR) 25 mg, Oral, DAILY    memantine (NAMENDA) 10 mg, Oral, 2 TIMES DAILY    risperiDONE (RISPERDAL) 0.25 mg, Oral, 2 TIMES DAILY         Objective:   Patient Vitals for the past 24 hrs:   Temp Pulse Resp BP SpO2   12/10/22 1322 98.8 °F (37.1 °C) 74 16 (!) 176/79 96 %       Oxygen Therapy  SpO2: 96 %  O2 Device: None (Room air)    Estimated body mass index is 26.89 kg/m² as calculated from the following:    Height as of this encounter: 5' 2\" (1.575 m). Weight as of this encounter: 147 lb (66.7 kg). No intake or output data in the 24 hours ending 12/10/22 1557      Physical Exam:  Blood pressure (!) 176/79, pulse 74, temperature 98.8 °F (37.1 °C), temperature source Oral, resp. rate 16, height 5' 2\" (1.575 m), weight 147 lb (66.7 kg), SpO2 96 %. General:    Well nourished. Head:  Normocephalic, atraumatic  Eyes:  Sclerae appear normal.  Pupils equally round. ENT:  Nares appear normal, no drainage. Moist oral mucosa  Neck:  No restricted ROM. Trachea midline   CV:   RRR. No m/r/g. No jugular venous distension. Lungs:   CTAB. No wheezing, rhonchi, or rales. Symmetric expansion. Abdomen: Bowel sounds present. Soft, nontender, nondistended. Extremities: No cyanosis or clubbing. trace edema LE bilat. Skin:     No rashes and normal coloration. Warm and dry. Neuro:  CN II-XII grossly intact. Sensation intact. A&Ox2  Psych:  Normal mood and affect.       I have personally reviewed labs and tests showing:  Recent Labs:  Recent Results (from the past 24 hour(s))   EKG 12 Lead    Collection Time: 12/10/22  1:43 PM   Result Value Ref Range    Ventricular Rate 69 BPM    Atrial Rate 69 BPM    P-R Interval 134 ms    QRS Duration 72 ms    Q-T Interval 402 ms    QTc Calculation (Bazett) 430 ms    P Axis 73 degrees    R Axis 45 degrees    T Axis 48 degrees    Diagnosis !! AGE AND GENDER SPECIFIC ECG ANALYSIS !!    CBC    Collection Time: 12/10/22  1:44 PM   Result Value Ref Range    WBC 3.6 (L) 4.3 - 11.1 K/uL    RBC 4.16 4.05 - 5.2 M/uL    Hemoglobin 12.8 11.7 - 15.4 g/dL    Hematocrit 39.4 35.8 - 46.3 %    MCV 94.7 82.0 - 102.0 FL    MCH 30.8 26.1 - 32.9 PG    MCHC 32.5 31.4 - 35.0 g/dL    RDW 12.8 11.9 - 14.6 %    Platelets 159 352 - 721 K/uL    MPV 9.2 (L) 9.4 - 12.3 FL    nRBC 0.00 0.0 - 0.2 K/uL   CMP    Collection Time: 12/10/22  1:44 PM   Result Value Ref Range    Sodium 138 133 - 143 mmol/L    Potassium 3.6 3.5 - 5.1 mmol/L    Chloride 103 101 - 110 mmol/L    CO2 28 21 - 32 mmol/L    Anion Gap 7 2 - 11 mmol/L    Glucose 96 65 - 100 mg/dL    BUN 15 8 - 23 MG/DL    Creatinine 1.18 (H) 0.6 - 1.0 MG/DL    Est, Glom Filt Rate 46 (L) >60 ml/min/1.73m2    Calcium 9.3 8.3 - 10.4 MG/DL    Total Bilirubin 0.8 0.2 - 1.1 MG/DL    ALT 15 12 - 65 U/L    AST 16 15 - 37 U/L    Alk Phosphatase 114 50 - 130 U/L    Total Protein 7.3 6.3 - 8.2 g/dL    Albumin 3.3 3.2 - 4.6 g/dL    Globulin 4.0 2.8 - 4.5 g/dL    Albumin/Globulin Ratio 0.8 0.4 - 1.6     Lactic Acid    Collection Time: 12/10/22  1:44 PM   Result Value Ref Range    Lactic Acid, Plasma 1.2 0.4 - 2.0 MMOL/L   Procalcitonin    Collection Time: 12/10/22  1:44 PM   Result Value Ref Range    Procalcitonin <0.05 0.00 - 0.49 ng/mL   COVID-19, Rapid    Collection Time: 12/10/22  2:32 PM    Specimen: Nasopharyngeal   Result Value Ref Range    Source Nasopharyngeal      SARS-CoV-2, Rapid Not detected NOTD     Rapid influenza A/B antigens    Collection Time: 12/10/22  2:32 PM    Specimen: Nasal Washing   Result Value Ref Range    Influenza A Ag Negative NEG      Influenza B Ag Negative NEG      Source Nasopharyngeal         I have personally reviewed imaging studies showing:  CT HEAD WO CONTRAST    Result Date: 12/10/2022  Head CT INDICATION: Generalized weakness, altered mental status Multiple axial images obtained through the brain without intravenous contrast. Radiation dose reduction techniques were used for this study: All CT scans performed at this facility use one or all of the following: Automated exposure control, adjustment of the mA and/or kVp according to patient's size, iterative reconstruction. FINDINGS: No areas of abnormal attenuation are seen in the brain. There is no CT evidence of acute hemorrhage or infarction. The ventricles are normal in size.  There are no extra-axial fluid collections. No masses are seen. The sinuses are clear. There are no bony lesions. No CT evidence of acute intracranial abnormality. XR CHEST PORTABLE    Result Date: 12/10/2022  Portable chest: History: Cough, weakness, confusion, concern for sepsis Comparison: 01/06/2021 Findings: A single view of the chest was obtained at 1441 hours. The cardiac silhouette is normal in size and configuration. Minor interstitial opacities at the lung bases would be most compatible with scarring, similar to that seen previously. There are no superimposed space opacities or pleural effusions. The pulmonary vascularity is within normal limits. Mild chronic scarring at the lung bases. Echocardiogram:  No results found for this or any previous visit.         Orders Placed This Encounter   Medications    sodium chloride flush 0.9 % injection 5-40 mL    sodium chloride flush 0.9 % injection 5-40 mL    0.9 % sodium chloride infusion    enoxaparin (LOVENOX) injection 40 mg     Order Specific Question:   Indication of Use     Answer:   Prophylaxis-DVT/PE    OR Linked Order Group     ondansetron (ZOFRAN-ODT) disintegrating tablet 4 mg     ondansetron (ZOFRAN) injection 4 mg    polyethylene glycol (GLYCOLAX) packet 17 g    OR Linked Order Group     acetaminophen (TYLENOL) tablet 650 mg     acetaminophen (TYLENOL) suppository 650 mg    levETIRAcetam (KEPPRA) tablet 500 mg         Signed:  PRECIOUS Schwab - JESUSITA

## 2022-12-10 NOTE — ED NOTES
TRANSFER - OUT REPORT:    Verbal report given to Kavon on Kassie Granados  being transferred to [facility] for routine progression of patient care       Report consisted of patient's Situation, Background, Assessment and   Recommendations(SBAR). Information from the following report(s) Nurse Handoff Report, ED SBAR and [facility] was reviewed with the receiving nurse. Lines:   Peripheral IV 12/10/22 Right Wrist (Active)   Site Assessment Clean, dry & intact 12/10/22 1348   Line Status Blood return noted 12/10/22 1348   Phlebitis Assessment No symptoms 12/10/22 1348   Infiltration Assessment 0 12/10/22 1348        Opportunity for questions and clarification was provided.       Patient transported with:  Margarito Weaver RN  12/10/22 8342

## 2022-12-10 NOTE — ACP (ADVANCE CARE PLANNING)
[facility] Hospitalist [facility]  At the heart of better care     Advance Care Planning   Admit Date:  12/10/2022  1:21 PM   Name:  Gracia Ambrocio   Age:  78 y.o. Sex:  female  :  1940   MRN:  [id number]   Room:  [address] [place], [place] [address]za is able to make her own decisions:   No    If pt unable to make decisions, POA/surrogate decision maker:  Kortney Warner    Other people present:   NA    Patient / surrogate decision-maker directed code status:  DNR    Other ACP topics discussed, if applicable:   PT/OT eval    Patient or surrogate consented to discussion of the current conditions, workup, management plans, prognosis, and the risk for further deterioration. Time spent: 30 minutes in direct discussion.       Signed:  PRECIOUS Jackson - JESUSITA

## 2022-12-10 NOTE — ED TRIAGE NOTES
Patient arrives from Home via EMS with complaint of generalized weakness. Per EMS patient lives with daughter and daughter called stating patient was more confused and having more difficulty getting around. EMS reported that patient has Alzheimer history and is confused at baseline. Patient alert to self and place during triage. Patient reports bilateral knee pain.

## 2022-12-11 LAB
ANION GAP SERPL CALC-SCNC: 5 MMOL/L (ref 2–11)
BASOPHILS # BLD: 0 K/UL (ref 0–0.2)
BASOPHILS NFR BLD: 1 % (ref 0–2)
BUN SERPL-MCNC: 13 MG/DL (ref 8–23)
CALCIUM SERPL-MCNC: 8.9 MG/DL (ref 8.3–10.4)
CHLORIDE SERPL-SCNC: 104 MMOL/L (ref 101–110)
CO2 SERPL-SCNC: 27 MMOL/L (ref 21–32)
CREAT SERPL-MCNC: 1.11 MG/DL (ref 0.6–1)
DIFFERENTIAL METHOD BLD: ABNORMAL
EKG ATRIAL RATE: 69 BPM
EKG DIAGNOSIS: NORMAL
EKG P AXIS: 73 DEGREES
EKG P-R INTERVAL: 134 MS
EKG Q-T INTERVAL: 402 MS
EKG QRS DURATION: 72 MS
EKG QTC CALCULATION (BAZETT): 430 MS
EKG R AXIS: 45 DEGREES
EKG T AXIS: 48 DEGREES
EKG VENTRICULAR RATE: 69 BPM
EOSINOPHIL # BLD: 0.1 K/UL (ref 0–0.8)
EOSINOPHIL NFR BLD: 3 % (ref 0.5–7.8)
ERYTHROCYTE [DISTWIDTH] IN BLOOD BY AUTOMATED COUNT: 12.8 % (ref 11.9–14.6)
GLUCOSE SERPL-MCNC: 108 MG/DL (ref 65–100)
HCT VFR BLD AUTO: 35.5 % (ref 35.8–46.3)
HGB BLD-MCNC: 11.6 G/DL (ref 11.7–15.4)
IMM GRANULOCYTES # BLD AUTO: 0 K/UL (ref 0–0.5)
IMM GRANULOCYTES NFR BLD AUTO: 0 % (ref 0–5)
LYMPHOCYTES # BLD: 2.2 K/UL (ref 0.5–4.6)
LYMPHOCYTES NFR BLD: 53 % (ref 13–44)
MAGNESIUM SERPL-MCNC: 2.2 MG/DL (ref 1.8–2.4)
MCH RBC QN AUTO: 30.9 PG (ref 26.1–32.9)
MCHC RBC AUTO-ENTMCNC: 32.7 G/DL (ref 31.4–35)
MCV RBC AUTO: 94.4 FL (ref 82–102)
MONOCYTES # BLD: 0.5 K/UL (ref 0.1–1.3)
MONOCYTES NFR BLD: 12 % (ref 4–12)
NEUTS SEG # BLD: 1.3 K/UL (ref 1.7–8.2)
NEUTS SEG NFR BLD: 31 % (ref 43–78)
NRBC # BLD: 0 K/UL (ref 0–0.2)
PLATELET # BLD AUTO: 272 K/UL (ref 150–450)
PMV BLD AUTO: 9 FL (ref 9.4–12.3)
POTASSIUM SERPL-SCNC: 3.4 MMOL/L (ref 3.5–5.1)
RBC # BLD AUTO: 3.76 M/UL (ref 4.05–5.2)
SODIUM SERPL-SCNC: 136 MMOL/L (ref 133–143)
WBC # BLD AUTO: 4.1 K/UL (ref 4.3–11.1)

## 2022-12-11 PROCEDURE — 97535 SELF CARE MNGMENT TRAINING: CPT

## 2022-12-11 PROCEDURE — 6370000000 HC RX 637 (ALT 250 FOR IP): Performed by: NURSE PRACTITIONER

## 2022-12-11 PROCEDURE — 96372 THER/PROPH/DIAG INJ SC/IM: CPT

## 2022-12-11 PROCEDURE — 80048 BASIC METABOLIC PNL TOTAL CA: CPT

## 2022-12-11 PROCEDURE — 2580000003 HC RX 258: Performed by: FAMILY MEDICINE

## 2022-12-11 PROCEDURE — 6360000002 HC RX W HCPCS: Performed by: FAMILY MEDICINE

## 2022-12-11 PROCEDURE — 2500000003 HC RX 250 WO HCPCS: Performed by: FAMILY MEDICINE

## 2022-12-11 PROCEDURE — 36415 COLL VENOUS BLD VENIPUNCTURE: CPT

## 2022-12-11 PROCEDURE — G0378 HOSPITAL OBSERVATION PER HR: HCPCS

## 2022-12-11 PROCEDURE — 85025 COMPLETE CBC W/AUTO DIFF WBC: CPT

## 2022-12-11 PROCEDURE — 6370000000 HC RX 637 (ALT 250 FOR IP): Performed by: FAMILY MEDICINE

## 2022-12-11 PROCEDURE — 83735 ASSAY OF MAGNESIUM: CPT

## 2022-12-11 PROCEDURE — 2580000003 HC RX 258: Performed by: NURSE PRACTITIONER

## 2022-12-11 PROCEDURE — 2500000003 HC RX 250 WO HCPCS: Performed by: STUDENT IN AN ORGANIZED HEALTH CARE EDUCATION/TRAINING PROGRAM

## 2022-12-11 PROCEDURE — 97530 THERAPEUTIC ACTIVITIES: CPT

## 2022-12-11 PROCEDURE — 6360000002 HC RX W HCPCS: Performed by: NURSE PRACTITIONER

## 2022-12-11 PROCEDURE — 97165 OT EVAL LOW COMPLEX 30 MIN: CPT

## 2022-12-11 PROCEDURE — 97161 PT EVAL LOW COMPLEX 20 MIN: CPT

## 2022-12-11 RX ORDER — LANOLIN ALCOHOL/MO/W.PET/CERES
3 CREAM (GRAM) TOPICAL ONCE
Status: COMPLETED | OUTPATIENT
Start: 2022-12-11 | End: 2022-12-11

## 2022-12-11 RX ADMIN — Medication 3 MG: at 20:49

## 2022-12-11 RX ADMIN — OLANZAPINE 5 MG: 10 INJECTION, POWDER, FOR SOLUTION INTRAMUSCULAR at 21:57

## 2022-12-11 RX ADMIN — SODIUM CHLORIDE, PRESERVATIVE FREE 10 ML: 5 INJECTION INTRAVENOUS at 20:53

## 2022-12-11 RX ADMIN — ASPIRIN 81 MG: 81 TABLET, CHEWABLE ORAL at 09:39

## 2022-12-11 RX ADMIN — ESCITALOPRAM OXALATE 5 MG: 10 TABLET ORAL at 09:37

## 2022-12-11 RX ADMIN — LEVETIRACETAM 500 MG: 500 TABLET, FILM COATED ORAL at 19:58

## 2022-12-11 RX ADMIN — TUBERCULIN PURIFIED PROTEIN DERIVATIVE 5 UNITS: 5 INJECTION, SOLUTION INTRADERMAL at 18:27

## 2022-12-11 RX ADMIN — RISPERIDONE 0.25 MG: 0.5 TABLET, FILM COATED ORAL at 19:00

## 2022-12-11 RX ADMIN — DONEPEZIL HYDROCHLORIDE 10 MG: 5 TABLET, FILM COATED ORAL at 19:58

## 2022-12-11 RX ADMIN — ZIPRASIDONE MESYLATE 10 MG: 20 INJECTION, POWDER, LYOPHILIZED, FOR SOLUTION INTRAMUSCULAR at 22:53

## 2022-12-11 RX ADMIN — ENOXAPARIN SODIUM 40 MG: 100 INJECTION SUBCUTANEOUS at 18:15

## 2022-12-11 RX ADMIN — LEVETIRACETAM 250 MG: 500 TABLET, FILM COATED ORAL at 09:40

## 2022-12-11 RX ADMIN — LOSARTAN POTASSIUM 25 MG: 25 TABLET, FILM COATED ORAL at 09:41

## 2022-12-11 RX ADMIN — RISPERIDONE 0.25 MG: 0.5 TABLET, FILM COATED ORAL at 09:38

## 2022-12-11 ASSESSMENT — PAIN SCALES - GENERAL
PAINLEVEL_OUTOF10: 0
PAINLEVEL_OUTOF10: 0

## 2022-12-11 NOTE — PROGRESS NOTES
Pt has become increasingly confused and agitated since 1630. She won't stay in her room. A sitter was requested. Doctor was notified.

## 2022-12-11 NOTE — PROGRESS NOTES
ACUTE OCCUPATIONAL THERAPY GOALS:   (Developed with and agreed upon by patient and/or caregiver.)  1. Patient will perform grooming with SPV and adaptive equipment as needed. 3. Patient will perform lower body dressing with SPV and adaptive equipment as needed. 5. Patient will perform toileting and toilet transfer with SPV and adaptive equipment as needed. 6. Patient will perform ADL functional mobility and tranfers in room with SPV and adaptive equipment as needed. Timeframe: 7 visits     ALL GOALS MET 12/11/22     OCCUPATIONAL THERAPY Initial Assessment, Daily Note, Discharge, and AM       OT Visit Days: 1  Acknowledge Orders  Time  OT Charge Capture  Rehab Caseload Tracker      Ericka Beth is a 78 y.o. female   PRIMARY DIAGNOSIS: Weakness  Weakness [R53.1]  Altered mental status, unspecified altered mental status type [R41.82]       Reason for Referral: Generalized Muscle Weakness (M62.81)  Observation: Payor: MEDICARE / Plan: MEDICARE PART A AND B / Product Type: *No Product type* /     ASSESSMENT:     REHAB RECOMMENDATIONS:   Recommendation to date pending progress:  Setting:  No further skilled therapy after discharge from hospital    Equipment:    None     ASSESSMENT:  Ms. Jeb Felipe presents with the above diagnoses and overall deficits in strength, functional mobility and ADL performance. At baseline, she lives with daughter and per chart, goes to adult  during the day. She is SPV level for ADLs and mobility. Pt with advanced dementia, and this morning was fixated on where her clothes and wig were. She was able to perform household mobility around room and grooming/toileting tasks in bathroom. She declined a shower as she usually takes a bath at home and wanted to wait until her clothes and daughter arrived. No further skilled OT indicated at this time.      [address] [facility] [address] [address] AM-PAC 6 Clicks Daily Activity Inpatient Short Form:    AM-PAC Daily Activity Inpatient   How much help for putting on and taking off regular lower body clothing?: None  How much help for Bathing?: A Little  How much help for Toileting?: None  How much help for putting on and taking off regular upper body clothing?: None  How much help for taking care of personal grooming?: None  How much help for eating meals?: None  AM-[facility] Inpatient Daily Activity Raw Score: 23  AM-PAC Inpatient ADL T-Scale Score : 51.12  ADL Inpatient CMS 0-100% Score: 15.86  ADL Inpatient CMS G-Code Modifier : CI           SUBJECTIVE:     Ms. Phares Holstein states, \"When did my daughter say she would get here? \"     Social/Functional Lives With: Daughter  Bathroom Shower/Tub: Tub/Shower unit    OBJECTIVE:     KAREN / Tracey Flores / Sandra Horowitz: None    RESTRICTIONS/PRECAUTIONS:       PAIN: VITALS / O2:   Pre Treatment:          Post Treatment: no c/o pain during session       Vitals          Oxygen            GROSS EVALUATION: INTACT IMPAIRED   (See Comments)   UE AROM [x] []   UE PROM [x] []   Strength [x]       Posture / Balance [x]     Sensation []     Coordination []       Tone []       Edema []    Activity Tolerance [x]       Hand Dominance R [] L []      COGNITION/  PERCEPTION: INTACT IMPAIRED   (See Comments)   Orientation []  Patient with advanced dementia, conversational   Vision [x]     Hearing [x]  [place]   Cognition  []  Short term memory   Perception []       MOBILITY: I Mod I S SBA CGA Min Mod Max Total  NT x2 Comments:   Bed Mobility    Rolling [] [] [] [] [] [] [] [] [] [] []    Supine to Sit [x] [] [] [] [] [] [] [] [] [] []    Scooting [x] [] [] [] [] [] [] [] [] [] []    Sit to Supine [x] [] [] [] [] [] [] [] [] [] []    Transfers    Sit to Stand [] [] [x] [] [] [] [] [] [] [] []    Bed to Chair [x] [] [] [] [] [] [] [] [] [] []    Stand to Sit [x] [] [] [] [] [] [] [] [] [] []    Tub/Shower [] [] [] [] [] [] [] [] [] [] []     Toilet [x] [] [] [] [] [] [] [] [] [] []      [] [] [] [] [] [] [] [] [] [] []    I=Independent, Mod I=Modified Independent, S=Supervision/Setup, SBA=Standby Assistance, CGA=Contact Guard Assistance, Min=Minimal Assistance, Mod=Moderate Assistance, Max=Maximal Assistance, Total=Total Assistance, NT=Not Tested    ACTIVITIES OF DAILY LIVING: I Mod I S SBA CGA Min Mod Max Total NT Comments   BASIC ADLs:              Upper Body Bathing  [] [] [] [] [] [] [] [] [] []    Lower Body Bathing [] [] [] [] [] [] [] [] [] []    Toileting [x] [] [x] [] [] [] [] [] [] []    Upper Body Dressing [x] [] [] [] [] [] [] [] [] []    Lower Body Dressing [x] [] [x] [] [] [] [] [] [] []    Feeding [] [] [] [] [] [] [] [] [] []    Grooming [] [] [x] [] [] [] [] [] [] []    Personal Device Care [] [] [] [] [] [] [] [] [] []    Functional Mobility [] [] [x] [] [] [] [] [] [] []    I=Independent, Mod I=Modified Independent, S=Supervision/Setup, SBA=Standby Assistance, CGA=Contact Guard Assistance, Min=Minimal Assistance, Mod=Moderate Assistance, Max=Maximal Assistance, Total=Total Assistance, NT=Not Tested    PLAN:   FREQUENCY/DURATION   OT Plan of Care:  (eval & dc) for duration of hospital stay or until stated goals are met, whichever comes first.    PROBLEM LIST:   (Skilled intervention is medically necessary to address:)  Decreased ADL/Functional Activities  Decreased Activity Tolerance   INTERVENTIONS PLANNED:  (Benefits and precautions of occupational therapy have been discussed with the patient.)  Self Care Training  Therapeutic Activity  Education         TREATMENT:     EVALUATION: LOW COMPLEXITY: (Untimed Charge)    TREATMENT:   Self Care (15 minutes): Patient participated in toileting, lower body dressing, and grooming ADLs in unsupported sitting and standing with minimal verbal cueing to increase independence and increase safety awareness. Patient also participated in functional mobility and functional transfer training to increase independence and increase safety awareness.      TREATMENT GRID:  N/A    AFTER TREATMENT PRECAUTIONS: Bed, Bed/Chair Locked, Call light within reach, Needs within reach, RN notified, and Sitting up in bed    INTERDISCIPLINARY COLLABORATION:  RN/ PCT and PT/ PTA    EDUCATION:  Education Given To: Patient  Education Provided: Role of Therapy;Plan of Care;ADL Adaptive Strategies  Education Method: Verbal;Demonstration  Barriers to Learning: Cognition  Education Outcome: Verbalized understanding;Demonstrated understanding    TOTAL TREATMENT DURATION AND TIME:  Time In: 0900  Time Out: Tadeo 58  Minutes: [address] [place], [place]

## 2022-12-11 NOTE — PROGRESS NOTES
Hospitalist Progress Note   Admit Date:  12/10/2022  1:21 PM   Name:  Bambi Mclain   Age:  78 y.o. Sex:  female  :  1940   MRN:  [id number]   Room:  [facility]/    Presenting Complaint: Fatigue     Reason(s) for Admission: Weakness [R53.1]  Altered mental status, unspecified altered mental status type [R41.82]     Hospital Course:   70-year-old female history of Alzheimer's, hypertension, seizure disorder, CKD stage III presented with functional decline over the past few days. Patient does go to adult . Daughter noticed patient having blank stares at time. Patient does do this when she has partial seizure disorder that presents like this. Patient does follow with neurology outpatient. CT head negative. Influenza and COVID are negative      Subjective & 24hr Events (22): Patient was seen and examined at the bedside. No overnight events. Patient denied cardiac chest pain, shortness of breath, abdominal pain, fever/chills. Patient lying in bed comfortably without any major complaint. Assessment & Plan:   Weakness  - UA negative  - PT/OT  - PT recommending no further treatment    Seizure disorder  - Continue home Keppra 250 mg a.m., 500 mg nightly  - EEG pending    Essential hypertension  - Currently controlled  - Continue ARB    Late onset Alzheimer's disease with behavioral disturbance  - Continue home Namenda and Aricept  - Risperdal resumed    CKD stage III  - Baseline of 1.18  - Continue to monitor daily BMP        Anticipated discharge needs:    Dispo pending clinical course. EEG pending. Possible discharge after EEG done    Diet:  ADULT DIET; Regular  DVT PPx: Lovenox  Code status: DNR    Hospital Problems:  Principal Problem:    Weakness  Active Problems:    Essential hypertension    Late onset Alzheimer's disease with behavioral disturbance ([facility] [address]    Chronic renal disease, stage III ([facility] [address]  Resolved Problems:    * No resolved hospital problems.  *      Objective: Patient Vitals for the past 24 hrs:   Temp Pulse Resp BP SpO2   12/11/22 1222 97.7 °F (36.5 °C) (!) 102 16 (!) 147/93 97 %   12/11/22 0800 98.5 °F (36.9 °C) (!) 104 14 (!) 152/87 --   12/11/22 0345 98.3 °F (36.8 °C) 78 16 136/72 100 %   12/10/22 2345 97.6 °F (36.4 °C) 79 18 (!) 178/80 --   12/10/22 2047 -- -- 16 -- --   12/10/22 1945 98.6 °F (37 °C) 76 22 (!) 174/93 98 %   12/10/22 1811 -- 72 -- (!) 177/84 97 %   12/10/22 1721 -- 70 -- (!) 189/84 96 %       Oxygen Therapy  SpO2: 97 %  O2 Device: None (Room air)    Estimated body mass index is 26.89 kg/m² as calculated from the following:    Height as of this encounter: 5' 2\" (1.575 m). Weight as of this encounter: 147 lb (66.7 kg). No intake or output data in the 24 hours ending 12/11/22 1449      Physical Exam:   Blood pressure (!) 147/93, pulse (!) 102, temperature 97.7 °F (36.5 °C), resp. rate 16, height 5' 2\" (1.575 m), weight 147 lb (66.7 kg), SpO2 97 %. General:    Well nourished. Head:  Normocephalic, atraumatic  Eyes:  Sclerae appear normal.  Pupils equally round. ENT:  Nares appear normal, no drainage. Moist oral mucosa  Neck:  No restricted ROM. Trachea midline   CV:   RRR. No m/r/g. No jugular venous distension. Lungs:   CTAB. No wheezing, rhonchi, or rales. Symmetric expansion. Abdomen: Bowel sounds present. Soft, nontender, nondistended. Extremities: No cyanosis or clubbing. No edema  Skin:     No rashes and normal coloration. Warm and dry. Neuro:  CN II-XII grossly intact. Sensation intact. A&Ox2  Psych:  Normal mood and affect.       I have personally reviewed labs and tests showing:  Recent Labs:  Recent Results (from the past 48 hour(s))   EKG 12 Lead    Collection Time: 12/10/22  1:43 PM   Result Value Ref Range    Ventricular Rate 69 BPM    Atrial Rate 69 BPM    P-R Interval 134 ms    QRS Duration 72 ms    Q-T Interval 402 ms    QTc Calculation (Bazett) 430 ms    P Axis 73 degrees    R Axis 45 degrees    T Axis 48 degrees    Diagnosis     CBC    Collection Time: 12/10/22  1:44 PM   Result Value Ref Range    WBC 3.6 (L) 4.3 - 11.1 K/uL    RBC 4.16 4.05 - 5.2 M/uL    Hemoglobin 12.8 11.7 - 15.4 g/dL    Hematocrit 39.4 35.8 - 46.3 %    MCV 94.7 82.0 - 102.0 FL    MCH 30.8 26.1 - 32.9 PG    MCHC 32.5 31.4 - 35.0 g/dL    RDW 12.8 11.9 - 14.6 %    Platelets 628 991 - 253 K/uL    MPV 9.2 (L) 9.4 - 12.3 FL    nRBC 0.00 0.0 - 0.2 K/uL   CMP    Collection Time: 12/10/22  1:44 PM   Result Value Ref Range    Sodium 138 133 - 143 mmol/L    Potassium 3.6 3.5 - 5.1 mmol/L    Chloride 103 101 - 110 mmol/L    CO2 28 21 - 32 mmol/L    Anion Gap 7 2 - 11 mmol/L    Glucose 96 65 - 100 mg/dL    BUN 15 8 - 23 MG/DL    Creatinine 1.18 (H) 0.6 - 1.0 MG/DL    Est, Glom Filt Rate 46 (L) >60 ml/min/1.73m2    Calcium 9.3 8.3 - 10.4 MG/DL    Total Bilirubin 0.8 0.2 - 1.1 MG/DL    ALT 15 12 - 65 U/L    AST 16 15 - 37 U/L    Alk Phosphatase 114 50 - 130 U/L    Total Protein 7.3 6.3 - 8.2 g/dL    Albumin 3.3 3.2 - 4.6 g/dL    Globulin 4.0 2.8 - 4.5 g/dL    Albumin/Globulin Ratio 0.8 0.4 - 1.6     Lactic Acid    Collection Time: 12/10/22  1:44 PM   Result Value Ref Range    Lactic Acid, Plasma 1.2 0.4 - 2.0 MMOL/L   Procalcitonin    Collection Time: 12/10/22  1:44 PM   Result Value Ref Range    Procalcitonin <0.05 0.00 - 0.49 ng/mL   COVID-19, Rapid    Collection Time: 12/10/22  2:32 PM    Specimen: Nasopharyngeal   Result Value Ref Range    Source Nasopharyngeal      SARS-CoV-2, Rapid Not detected NOTD     Rapid influenza A/B antigens    Collection Time: 12/10/22  2:32 PM    Specimen: Nasal Washing   Result Value Ref Range    Influenza A Ag Negative NEG      Influenza B Ag Negative NEG      Source Nasopharyngeal     Urinalysis w rflx microscopic    Collection Time: 12/10/22  4:30 PM   Result Value Ref Range    Color, UA YELLOW/STRAW      Appearance CLOUDY      Specific Exline, UA 1.014 1.001 - 1.023      pH, Urine 8.5 5.0 - 9.0      Protein, UA Negative NEG mg/dL    Glucose, UA Negative mg/dL    Ketones, Urine 15 (A) NEG mg/dL    Bilirubin Urine Negative NEG      Blood, Urine Negative NEG      Urobilinogen, Urine 1.0 0.2 - 1.0 EU/dL    Nitrite, Urine Negative NEG      Leukocyte Esterase, Urine Negative NEG     Basic Metabolic Panel w/ Reflex to MG    Collection Time: 12/11/22  3:44 AM   Result Value Ref Range    Sodium 136 133 - 143 mmol/L    Potassium 3.4 (L) 3.5 - 5.1 mmol/L    Chloride 104 101 - 110 mmol/L    CO2 27 21 - 32 mmol/L    Anion Gap 5 2 - 11 mmol/L    Glucose 108 (H) 65 - 100 mg/dL    BUN 13 8 - 23 MG/DL    Creatinine 1.11 (H) 0.6 - 1.0 MG/DL    Est, Glom Filt Rate 49 (L) >60 ml/min/1.73m2    Calcium 8.9 8.3 - 10.4 MG/DL   CBC with Auto Differential    Collection Time: 12/11/22  3:44 AM   Result Value Ref Range    WBC 4.1 (L) 4.3 - 11.1 K/uL    RBC 3.76 (L) 4.05 - 5.2 M/uL    Hemoglobin 11.6 (L) 11.7 - 15.4 g/dL    Hematocrit 35.5 (L) 35.8 - 46.3 %    MCV 94.4 82.0 - 102.0 FL    MCH 30.9 26.1 - 32.9 PG    MCHC 32.7 31.4 - 35.0 g/dL    RDW 12.8 11.9 - 14.6 %    Platelets 159 974 - 874 K/uL    MPV 9.0 (L) 9.4 - 12.3 FL    nRBC 0.00 0.0 - 0.2 K/uL    Differential Type AUTOMATED      Seg Neutrophils 31 (L) 43 - 78 %    Lymphocytes 53 (H) 13 - 44 %    Monocytes 12 4.0 - 12.0 %    Eosinophils % 3 0.5 - 7.8 %    Basophils 1 0.0 - 2.0 %    Immature Granulocytes 0 0.0 - 5.0 %    Segs Absolute 1.3 (L) 1.7 - 8.2 K/UL    Absolute Lymph # 2.2 0.5 - 4.6 K/UL    Absolute Mono # 0.5 0.1 - 1.3 K/UL    Absolute Eos # 0.1 0.0 - 0.8 K/UL    Basophils Absolute 0.0 0.0 - 0.2 K/UL    Absolute Immature Granulocyte 0.0 0.0 - 0.5 K/UL   Magnesium    Collection Time: 12/11/22  3:44 AM   Result Value Ref Range    Magnesium 2.2 1.8 - 2.4 mg/dL       I have personally reviewed imaging studies showing: Other Studies:  CT HEAD WO CONTRAST   Final Result   No CT evidence of acute intracranial abnormality.          XR CHEST PORTABLE   Final Result   Mild chronic scarring at the lung bases.                         Current Meds:  Current Facility-Administered Medications   Medication Dose Route Frequency    sodium chloride flush 0.9 % injection 5-40 mL  5-40 mL IntraVENous 2 times per day    sodium chloride flush 0.9 % injection 5-40 mL  5-40 mL IntraVENous PRN    0.9 % sodium chloride infusion   IntraVENous PRN    enoxaparin (LOVENOX) injection 40 mg  40 mg SubCUTAneous Q24H    ondansetron (ZOFRAN-ODT) disintegrating tablet 4 mg  4 mg Oral Q8H PRN    Or    ondansetron (ZOFRAN) injection 4 mg  4 mg IntraVENous Q6H PRN    polyethylene glycol (GLYCOLAX) packet 17 g  17 g Oral Daily PRN    acetaminophen (TYLENOL) tablet 650 mg  650 mg Oral Q6H PRN    Or    acetaminophen (TYLENOL) suppository 650 mg  650 mg Rectal Q6H PRN    aspirin chewable tablet 81 mg  81 mg Oral Daily    donepezil (ARICEPT) tablet 10 mg  10 mg Oral Nightly    escitalopram (LEXAPRO) tablet 5 mg  5 mg Oral Daily    losartan (COZAAR) tablet 25 mg  25 mg Oral Daily    risperiDONE (RISPERDAL) tablet 0.25 mg  0.25 mg Oral BID    levETIRAcetam (KEPPRA) tablet 250 mg  250 mg Oral Daily    levETIRAcetam (KEPPRA) tablet 500 mg  500 mg Oral Nightly       Signed:  Jeannie Rider MD    Part of this note may have been written by using a voice dictation software. The note has been proof read but may still contain some grammatical/other typographical errors.

## 2022-12-11 NOTE — PROGRESS NOTES
ACUTE PHYSICAL THERAPY GOALS:   (Developed with and agreed upon by patient and/or caregiver.)   Patient will ambulate 100' with supervision within 1 treatment day. met  Patient will transfer between the bed and a chair with supervision within 1 treatment day. met    PHYSICAL THERAPY Initial Assessment and Discharge  (Link to Caseload Tracking: PT Visit Days : 1  Acknowledge Orders  Time In/Out  PT Charge Capture  Rehab Caseload Tracker    Santa Nagy is a 78 y.o. female   PRIMARY DIAGNOSIS: Weakness  Weakness [R53.1]  Altered mental status, unspecified altered mental status type [R41.82]       Reason for Referral: Unspecified Lack of Coordination (R27.9)  Observation: Payor: MEDICARE / Plan: MEDICARE PART A AND B / Product Type: *No Product type* /     ASSESSMENT:     REHAB RECOMMENDATIONS:   Recommendation to date pending progress:  Setting:  No further skilled therapy after discharge from hospital    Equipment:    None     ASSESSMENT:  Ms. Srinivas Whalen admitted with above diagnoses. Patient has an additional diagnosis of dementia. Per chart review, patient lives with family and goes to an adult  during the week. Patient participated well with today's assessment. She reports she goes to the \"gym\" with her friends on Monday, Wednesday, and Friday. When I told her that tomorrow was her day to go to the gym, she asked if today was Sunday. Patient did say she takes herself to the gym which is hopefully not accurate. Patient was independent with getting out of bed, she ambulated in the room with supervision only. She didn't want to leave the room in her pajamas but she ambulated around the room multiple times without a device and without any evidence of instability. She preferred to sit in the chair at the end of the session. Patient does not demonstrate any acute or d/c therapy needs and will be discharged from therapy services at this time.        [address] Main Mobility Inpatient Short Form  AM-PAC Mobility Inpatient   How much difficulty turning over in bed?: None  How much difficulty sitting down on / standing up from a chair with arms?: None  How much difficulty moving from lying on back to sitting on side of bed?: None  How much help from another person moving to and from a bed to a chair?: None  How much help from another person needed to walk in hospital room?: None  How much help from another person for climbing 3-5 steps with a railing?: None  AM-PAC Inpatient Mobility Raw Score : 24  AM-PAC Inpatient T-Scale Score : 61.14  Mobility Inpatient CMS 0-100% Score: 0  Mobility Inpatient CMS G-Code Modifier : CH    SUBJECTIVE:   Ms. Walter Barone agreeable. Reports she doesn't want to go in the hallway without her clothes. Social/Functional  patient lives with daughter, grand-daughter, and great grand-daughter. She goes to an adult  during the week.     OBJECTIVE:     PAIN: Abby Muster / O2: PRECAUTION / Agnieszka Luca / DRAINS:   Pre Treatment:   Pain Assessment: 0-10  Pain Level: 0      Post Treatment: 0 Vitals        Oxygen      None    RESTRICTIONS/PRECAUTIONS:                    GROSS EVALUATION: Intact Impaired (Comments):   AROM [x]     PROM []    Strength [x]     Balance [x]     Posture [] N/A   Sensation [x]     Coordination [x]      Tone [x]     Edema []    Activity Tolerance [x]      []      COGNITION/  PERCEPTION: Intact Impaired (Comments):   Orientation [] Oriented to person   Vision []     Hearing []     Cognition  []       MOBILITY: I Mod I S SBA CGA Min Mod Max Total  NT x2 Comments:   Bed Mobility    Rolling [] [] [] [] [] [] [] [] [] [] []    Supine to Sit [x] [] [] [] [] [] [] [] [] [] []    Scooting [x] [] [] [] [] [] [] [] [] [] []    Sit to Supine [] [] [] [] [] [] [] [] [] [] []    Transfers    Sit to Stand [] [] [x] [] [] [] [] [] [] [] []    Bed to Chair [] [] [x] [] [] [] [] [] [] [] []    Stand to Sit [] [] [x] [] [] [] [] [] [] [] []     [] [] [] [] [] [] [] [] [] [] []    I=Independent, Mod I=Modified Independent, S=Supervision, SBA=Standby Assistance, CGA=Contact Guard Assistance,   Min=Minimal Assistance, Mod=Moderate Assistance, Max=Maximal Assistance, Total=Total Assistance, NT=Not Tested    GAIT: I Mod I S SBA CGA Min Mod Max Total  NT x2 Comments:   Level of Assistance [] [] [x] [] [] [] [] [] [] [] []    Distance 100 feet    DME None    Gait Quality Decreased trevon     Weightbearing Status      Stairs      I=Independent, Mod I=Modified Independent, S=Supervision, SBA=Standby Assistance, CGA=Contact Guard Assistance,   Min=Minimal Assistance, Mod=Moderate Assistance, Max=Maximal Assistance, Total=Total Assistance, NT=Not Tested    PLAN:   FREQUENCY AND DURATION:   evaluation/discharge    THERAPY PROGNOSIS: Good    PROBLEM LIST:   (Skilled intervention is medically necessary to address:)  none INTERVENTIONS PLANNED:   (Benefits and precautions of physical therapy have been discussed with the patient.)  evaluation       TREATMENT:   EVALUATION: LOW COMPLEXITY: (Untimed Charge)    TREATMENT:   Therapeutic Activity (14 Minutes): Therapeutic activity included Supine to Sit, Scooting, Transfer Training, Ambulation on level ground, Sitting balance , and Standing balance to improve functional Mobility.     TREATMENT GRID:  N/A    AFTER TREATMENT PRECAUTIONS: Bed/Chair Locked, Call light within reach, Chair, and Needs within reach    INTERDISCIPLINARY COLLABORATION:  RN/ PCT    EDUCATION: Education Given To: Patient  Education Provided: Role of Therapy;Plan of Care  Education Method: Verbal  Education Outcome: Verbalized understanding    TIME IN/OUT:  Time In: 0566  Time Out: 0935  Minutes: [address] Carrie Cheng PT

## 2022-12-11 NOTE — PLAN OF CARE
Shift assessment complete. Patient is alert and partially oriented. Able to give name and date of birth. Stated location as \"nursing home\". Daughter states that patient is experiencing more confusion than usual.   Respirations are even and unlabored. Patient is on room air, but does report ongoing non productive cough. Bowel sounds are present. Patient does have edema in lower extremities. Patient ambulated to restroom with daughter's assistance. Daughter plans to leave after visiting hours are over. Confirmed number [phone] for emergencies. Bed low and locked, call light within reach.    Bed alarm on .   __________________________________________       12/10/22 1945   Dual Clinician Skin Assessment   Dual Skin Assessment (4 Eyes) WDL   Second Clinical  (First and Last Name) JOZEF Bishop   Skin Integumentary    Skin Integumentary (WDL) WDL         Problem: Pain  Goal: Verbalizes/displays adequate comfort level or baseline comfort level  Outcome: Progressing

## 2022-12-11 NOTE — CARE COORDINATION
12/11: 78 yr-old F pt. with functional decline past 2 days. She lives with her family and goes to adult . If rehab recommended by PT/OT dtr would like [facility]. [facility] not accepting waiver. Dtr's other choices are [facility] and [facility]. Referrals in [facility]. .     12/11/22 1009   Service Assessment   Patient Orientation Alert and Oriented   Cognition Alert   History Provided By Child/Family   Accompanied By/Relationship daughter   Support Systems Family Members   Patient's [place] is: Named in [facility]   PCP Verified by CM Yes   Prior Functional Level Dressing   Current Functional Level Dressing   Can patient return to prior living arrangement Unknown at present   Ability to make needs known: Fair   Family able to assist with home care needs: Yes   Would you like for me to discuss the discharge plan with any other family members/significant others, and if so, who? No   Financial Resources [employer] Other (Comment)   Discharge Planning   Type of Residence [facility]   Patient expects to be discharged to: [facility] Discharge   [place] Discharge [facility] (SNF)   Condition of Participation: Discharge Planning   The Plan for Transition of Care is related to the following treatment goals: Increase strength   The Patient and/or Patient Representative was provided with a Choice of Provider?  Patient

## 2022-12-12 PROCEDURE — 96372 THER/PROPH/DIAG INJ SC/IM: CPT

## 2022-12-12 PROCEDURE — 6370000000 HC RX 637 (ALT 250 FOR IP): Performed by: NURSE PRACTITIONER

## 2022-12-12 PROCEDURE — 6360000002 HC RX W HCPCS: Performed by: NURSE PRACTITIONER

## 2022-12-12 PROCEDURE — G0378 HOSPITAL OBSERVATION PER HR: HCPCS

## 2022-12-12 RX ADMIN — RISPERIDONE 0.25 MG: 0.5 TABLET, FILM COATED ORAL at 09:03

## 2022-12-12 RX ADMIN — LEVETIRACETAM 250 MG: 500 TABLET, FILM COATED ORAL at 09:01

## 2022-12-12 RX ADMIN — LEVETIRACETAM 500 MG: 500 TABLET, FILM COATED ORAL at 21:15

## 2022-12-12 RX ADMIN — ASPIRIN 81 MG: 81 TABLET, CHEWABLE ORAL at 09:03

## 2022-12-12 RX ADMIN — RISPERIDONE 0.25 MG: 0.5 TABLET, FILM COATED ORAL at 21:15

## 2022-12-12 RX ADMIN — DONEPEZIL HYDROCHLORIDE 10 MG: 5 TABLET, FILM COATED ORAL at 21:14

## 2022-12-12 RX ADMIN — ENOXAPARIN SODIUM 40 MG: 100 INJECTION SUBCUTANEOUS at 18:16

## 2022-12-12 RX ADMIN — LOSARTAN POTASSIUM 25 MG: 25 TABLET, FILM COATED ORAL at 09:02

## 2022-12-12 RX ADMIN — ESCITALOPRAM OXALATE 5 MG: 10 TABLET ORAL at 09:02

## 2022-12-12 NOTE — FLOWSHEET NOTE
12/11/22 2300   Elopement Risk Screen   Is the Patient on an Involuntary Hold No   Does the Patient Have a History of Elopement or Attempt of Elopement No   Is the Patient Talking About Leaving  Yes   Is the Patient Displaying Behaviors or Body Language That Indicates Elopement Yes;Pacing;Exit seeking   Additional Concern Based on Nursing Judgement Yes   Nursing Interventions for Elopement Risk Assist with personal care needs such as toileting, eating, dressing, as needed to reduce the risk of wandering;Collaborate with family members/caregivers to mitigate the elopement risk;Communicate/escalate to nursing supervisor the risk of elopement;Communicate to physician the risk for elopement; Make sure patient has all necessary personal care items;Place patient in room far away from exits and stairways; Reduce environmental triggers; Shoes and clothing collected and placed in gown attire

## 2022-12-12 NOTE — PLAN OF CARE
Patient remained restless. Continuous wondering. Believes that her family is outside waiting on her and is looking for her shoes. Called daughter Tan Nagy who briefly spoke to patient. Patient has done several laps around floor with staff. Received order for Melatonin. No improvement patient became increasingly restless and now agitated. Received order for Zyprexa. Administered, still no improvement. Patient began to hit staff and yell. Received order for Geodon. Attempted to call daughter to update her and as patient requested, went to voicemail. Bed low and locked. This RN at bedside. ______________________________    Shift assessment complete. Patient is alert and only oriented to person. Much more confused than last night. Insist she is waiting for her ride. Patient is pacing. Walked a lap around the floor as requested, still restless. No needs expressed at this time.    Bed low and locked, call light within reach.   ___________________________________________    Problem: Pain  Goal: Verbalizes/displays adequate comfort level or baseline comfort level  Outcome: Progressing

## 2022-12-12 NOTE — PROGRESS NOTES
Pt still roaming around and there is concern if pt will sit through the time length of EEG. Will try again later.    -JUAN GARCIA,  EEG TECH

## 2022-12-12 NOTE — PROGRESS NOTES
Medical Student Progress Note   Admit Date:  12/10/2022  1:21 PM   Name:  Debora Petersen   Age:  78 y.o. Sex:  female  :  1940   MRN:  [id number]     Presenting Complaint: Fatigue    Reason(s) for Admission: Weakness [R53.1]  Altered mental status, unspecified altered mental status type [facility] Course & Interval History:   75-year-old female with a past medical history of Alzheimer's disease, hypertension, anxiety, seizure disorder, and CKD 3 presented to the ED with her daughter for concerns of her functional decline over the past few days. Patients attends adult  typically. Daughter was concerned for patient's blank stare at times which she said could be due to the seizure disorder. She is currently taking Keppra for this. UA showed no concerns other than positive for ketones. CT of head showed no acute abnormality. CXR showed mild chronic scarring at lung bases. Patient was given IV fluids and home doses of Alzheimer's and seizure medications. PT/OT evaluated patient with no further need for treatment. Patient became restless and agitated on  at 11 pm and was given Zyprexa and Geodon. Unable to get EEG due to restlessness. Rehab has been discussed with the daughter at bedside. Subjective (22): Patient seemed confused and disoriented. Daughter was not at bedside. She says she did not sleep well last night. Denies chest pain, nausea, or vomiting. Endorses SOB. Left leg weakness was seen on examination at bedside. Assessment & Plan:   Weakness     -UA was negative     -PT/OT both recommending no further treatment   Seizure disorder    -Continue home Keppra dosing    -EEG pending    -Aspirin daily   Alzheimer's disease     -Continue home Namenda and Aricept and Risperdol   Essential hypertension     -Continue losartan   Chronic renal disease, stage III     -Avoid nephrotoxic substances     -Continue monitoring    Diet:  ADULT DIET;  Regular  DVT PPx: Lovenox SQ   Code status: DNR    Active Hospital Problems    Diagnosis Date Noted    Weakness 12/10/2022     Priority: Medium    Chronic renal disease, stage III ([facility] [address] 05/13/2022    Late onset Alzheimer's disease with behavioral disturbance ([facility] [address] 06/03/2019    Essential hypertension 11/06/2015     Objective:   Patient Vitals for the past 24 hrs:   Temp Pulse Resp BP SpO2   12/12/22 1530 98.2 °F (36.8 °C) 64 17 110/62 98 %   12/12/22 1121 98.4 °F (36.9 °C) 73 17 136/66 99 %   12/12/22 0722 98.4 °F (36.9 °C) 75 17 (!) 149/81 99 %   12/12/22 0316 97.8 °F (36.6 °C) 73 20 128/69 98 %   12/11/22 2300 97.4 °F (36.3 °C) 96 22 (!) 189/84 97 %   12/11/22 1945 -- 100 20 (!) 169/95 99 %          Estimated body mass index is 26.89 kg/m² as calculated from the following:    Height as of this encounter: 5' 2\" (1.575 m). Weight as of this encounter: 147 lb (66.7 kg). No intake or output data in the 24 hours ending 12/12/22 1558      Physical Exam:     General:    Well nourished. No overt distress  Head:  Normocephalic, atraumatic  Eyes:  Sclerae appear normal.  Pupils equally round. ENT:  Nares appear normal, no drainage. Moist oral mucosa  Neck:  No restricted ROM. Trachea midline   CV:   RRR. No m/r/g. No jugular venous distension. Lungs:   CTAB. No wheezing, rhonchi, or rales. Respirations even, unlabored  Abdomen: Bowel sounds present. Soft, nontender, nondistended. Extremities: No cyanosis or clubbing. No edema  Skin:     No rashes and normal coloration. Warm and dry. Neuro:  Cranial nerves II-XII grossly intact. Sensation intact  Psych:  Normal mood and affect. Slightly disoriented    I have reviewed ordered lab tests and independently visualized imaging below:    Last 24hr Labs:  No results found for this or any previous visit (from the past 24 hour(s)).     Current Meds:  Current Facility-Administered Medications   Medication Dose Route Frequency    tuberculin injection 5 Units  5 Units IntraDERmal Once    sodium chloride flush 0.9 % injection 5-40 mL  5-40 mL IntraVENous 2 times per day    sodium chloride flush 0.9 % injection 5-40 mL  5-40 mL IntraVENous PRN    0.9 % sodium chloride infusion   IntraVENous PRN    enoxaparin (LOVENOX) injection 40 mg  40 mg SubCUTAneous Q24H    ondansetron (ZOFRAN-ODT) disintegrating tablet 4 mg  4 mg Oral Q8H PRN    Or    ondansetron (ZOFRAN) injection 4 mg  4 mg IntraVENous Q6H PRN    polyethylene glycol (GLYCOLAX) packet 17 g  17 g Oral Daily PRN    acetaminophen (TYLENOL) tablet 650 mg  650 mg Oral Q6H PRN    Or    acetaminophen (TYLENOL) suppository 650 mg  650 mg Rectal Q6H PRN    aspirin chewable tablet 81 mg  81 mg Oral Daily    donepezil (ARICEPT) tablet 10 mg  10 mg Oral Nightly    escitalopram (LEXAPRO) tablet 5 mg  5 mg Oral Daily    losartan (COZAAR) tablet 25 mg  25 mg Oral Daily    risperiDONE (RISPERDAL) tablet 0.25 mg  0.25 mg Oral BID    levETIRAcetam (KEPPRA) tablet 250 mg  250 mg Oral Daily    levETIRAcetam (KEPPRA) tablet 500 mg  500 mg Oral Nightly       Signed:  Shayy Barriga    Part of this note may have been written by using a voice dictation software. The note has been proof read but may still contain some grammatical/other typographical errors.

## 2022-12-12 NOTE — CARE COORDINATION
Bed offered and accepted at [address] [place] [address]. Forty eight hour PPD reading due on Tuesday 12/13. CM will coordinate transport when medically stable and when pt can be accepted at [facility].

## 2022-12-12 NOTE — PROGRESS NOTES
Hospitalist Progress Note   Admit Date:  12/10/2022  1:21 PM   Name:  Darra Severe   Age:  78 y.o. Sex:  female  :  1940   MRN:  [id number]   Room:  [facility]    Presenting Complaint: Fatigue     Reason(s) for Admission: Weakness [R53.1]  Altered mental status, unspecified altered mental status type [R41.82]     Hospital Course:   47-year-old female history of Alzheimer's, hypertension, seizure disorder, CKD stage III presented with functional decline over a few days. Patient attended adult . Daughter noticed patient having blank stares at time. Patient does this when she has partial seizure disorder. Patient follows with neurology outpatient. CT head negative. Influenza and COVID were negative. Subjective & 24hr Events (22): No acute complaints. Difficulty with EEG due to difficulty remaining still. Discussed with daughter possibility of rehab. Assessment & Plan:   Weakness  -PT, OT, PPD    Essential hypertension  -losartan    Seizure  -levetiracetam  -EEG  -asa    Late onset Alzheimer's disease with behavioral disturbance  -memantine, donepezil, risperidone    Chronic renal disease, stage III  -avoid nephrotoxic substances      Anticipated discharge needs:      Short term rehab    Diet:  ADULT DIET; Regular  DVT PPx: enoxaparin  Code status: DNR    Hospital Problems:  Principal Problem:    Weakness  Active Problems:    Essential hypertension    Late onset Alzheimer's disease with behavioral disturbance ([facility] [address]    Chronic renal disease, stage III ([facility] [address]  Resolved Problems:    * No resolved hospital problems.  *      Objective:   Patient Vitals for the past 24 hrs:   Temp Pulse Resp BP SpO2   22 1121 98.4 °F (36.9 °C) 73 17 136/66 99 %   22 0722 98.4 °F (36.9 °C) 75 17 (!) 149/81 99 %   22 0316 97.8 °F (36.6 °C) 73 20 128/69 98 %   22 2300 97.4 °F (36.3 °C) 96 22 (!) 189/84 97 %   22 1945 -- 100 20 (!) 169/95 99 %   22 1505 98.6 °F (37 °C) 67 20 134/74 98 %       Oxygen Therapy  SpO2: 99 %  O2 Device: None (Room air)    Estimated body mass index is 26.89 kg/m² as calculated from the following:    Height as of this encounter: 5' 2\" (1.575 m). Weight as of this encounter: 147 lb (66.7 kg). No intake or output data in the 24 hours ending 12/12/22 1457      Blood pressure 136/66, pulse 73, temperature 98.4 °F (36.9 °C), resp. rate 17, height 5' 2\" (1.575 m), weight 147 lb (66.7 kg), SpO2 99 %. Physical Exam  Vitals and nursing note reviewed. Constitutional:       General: She is not in acute distress. Appearance: She is ill-appearing. She is not diaphoretic. Eyes:      Extraocular Movements: Extraocular movements intact. Cardiovascular:      Rate and Rhythm: Normal rate. Pulmonary:      Effort: Pulmonary effort is normal. No respiratory distress. Abdominal:      General: There is no distension. Musculoskeletal:         General: No deformity. Skin:     Coloration: Skin is not jaundiced or pale. Neurological:      General: No focal deficit present. Mental Status: She is alert. Mental status is at baseline. She is disoriented.    Psychiatric:         Mood and Affect: Mood normal.         Behavior: Behavior normal.         I have personally reviewed labs and tests showing:  Recent Labs:  Recent Results (from the past 48 hour(s))   Urinalysis w rflx microscopic    Collection Time: 12/10/22  4:30 PM   Result Value Ref Range    Color, UA YELLOW/STRAW      Appearance CLOUDY      Specific [place], UA 1.014 1.001 - 1.023      pH, Urine 8.5 5.0 - 9.0      Protein, UA Negative NEG mg/dL    Glucose, UA Negative mg/dL    Ketones, Urine 15 (A) NEG mg/dL    Bilirubin Urine Negative NEG      Blood, Urine Negative NEG      Urobilinogen, Urine 1.0 0.2 - 1.0 EU/dL    Nitrite, Urine Negative NEG      Leukocyte Esterase, Urine Negative NEG     Basic Metabolic Panel w/ Reflex to MG    Collection Time: 12/11/22  3:44 AM   Result Value Ref Range Sodium 136 133 - 143 mmol/L    Potassium 3.4 (L) 3.5 - 5.1 mmol/L    Chloride 104 101 - 110 mmol/L    CO2 27 21 - 32 mmol/L    Anion Gap 5 2 - 11 mmol/L    Glucose 108 (H) 65 - 100 mg/dL    BUN 13 8 - 23 MG/DL    Creatinine 1.11 (H) 0.6 - 1.0 MG/DL    Est, Glom Filt Rate 49 (L) >60 ml/min/1.73m2    Calcium 8.9 8.3 - 10.4 MG/DL   CBC with Auto Differential    Collection Time: 12/11/22  3:44 AM   Result Value Ref Range    WBC 4.1 (L) 4.3 - 11.1 K/uL    RBC 3.76 (L) 4.05 - 5.2 M/uL    Hemoglobin 11.6 (L) 11.7 - 15.4 g/dL    Hematocrit 35.5 (L) 35.8 - 46.3 %    MCV 94.4 82.0 - 102.0 FL    MCH 30.9 26.1 - 32.9 PG    MCHC 32.7 31.4 - 35.0 g/dL    RDW 12.8 11.9 - 14.6 %    Platelets 650 976 - 434 K/uL    MPV 9.0 (L) 9.4 - 12.3 FL    nRBC 0.00 0.0 - 0.2 K/uL    Differential Type AUTOMATED      Seg Neutrophils 31 (L) 43 - 78 %    Lymphocytes 53 (H) 13 - 44 %    Monocytes 12 4.0 - 12.0 %    Eosinophils % 3 0.5 - 7.8 %    Basophils 1 0.0 - 2.0 %    Immature Granulocytes 0 0.0 - 5.0 %    Segs Absolute 1.3 (L) 1.7 - 8.2 K/UL    Absolute Lymph # 2.2 0.5 - 4.6 K/UL    Absolute Mono # 0.5 0.1 - 1.3 K/UL    Absolute Eos # 0.1 0.0 - 0.8 K/UL    Basophils Absolute 0.0 0.0 - 0.2 K/UL    Absolute Immature Granulocyte 0.0 0.0 - 0.5 K/UL   Magnesium    Collection Time: 12/11/22  3:44 AM   Result Value Ref Range    Magnesium 2.2 1.8 - 2.4 mg/dL       I have personally reviewed imaging studies showing: Other Studies:  CT HEAD WO CONTRAST   Final Result   No CT evidence of acute intracranial abnormality. XR CHEST PORTABLE   Final Result   Mild chronic scarring at the lung bases.                          Current Meds:  Current Facility-Administered Medications   Medication Dose Route Frequency    tuberculin injection 5 Units  5 Units IntraDERmal Once    sodium chloride flush 0.9 % injection 5-40 mL  5-40 mL IntraVENous 2 times per day    sodium chloride flush 0.9 % injection 5-40 mL  5-40 mL IntraVENous PRN    0.9 % sodium chloride infusion   IntraVENous PRN    enoxaparin (LOVENOX) injection 40 mg  40 mg SubCUTAneous Q24H    ondansetron (ZOFRAN-ODT) disintegrating tablet 4 mg  4 mg Oral Q8H PRN    Or    ondansetron (ZOFRAN) injection 4 mg  4 mg IntraVENous Q6H PRN    polyethylene glycol (GLYCOLAX) packet 17 g  17 g Oral Daily PRN    acetaminophen (TYLENOL) tablet 650 mg  650 mg Oral Q6H PRN    Or    acetaminophen (TYLENOL) suppository 650 mg  650 mg Rectal Q6H PRN    aspirin chewable tablet 81 mg  81 mg Oral Daily    donepezil (ARICEPT) tablet 10 mg  10 mg Oral Nightly    escitalopram (LEXAPRO) tablet 5 mg  5 mg Oral Daily    losartan (COZAAR) tablet 25 mg  25 mg Oral Daily    risperiDONE (RISPERDAL) tablet 0.25 mg  0.25 mg Oral BID    levETIRAcetam (KEPPRA) tablet 250 mg  250 mg Oral Daily    levETIRAcetam (KEPPRA) tablet 500 mg  500 mg Oral Nightly       Signed:  Rudolph Salas MD

## 2022-12-13 PROBLEM — Z66 DO NOT RESUSCITATE STATUS: Status: ACTIVE | Noted: 2022-12-13

## 2022-12-13 LAB
MM INDURATION, POC: 0 MM (ref 0–5)
PPD, POC: NEGATIVE

## 2022-12-13 PROCEDURE — 6360000002 HC RX W HCPCS: Performed by: NURSE PRACTITIONER

## 2022-12-13 PROCEDURE — G0378 HOSPITAL OBSERVATION PER HR: HCPCS

## 2022-12-13 PROCEDURE — 6370000000 HC RX 637 (ALT 250 FOR IP): Performed by: NURSE PRACTITIONER

## 2022-12-13 PROCEDURE — 96372 THER/PROPH/DIAG INJ SC/IM: CPT

## 2022-12-13 RX ADMIN — ASPIRIN 81 MG: 81 TABLET, CHEWABLE ORAL at 08:32

## 2022-12-13 RX ADMIN — LEVETIRACETAM 500 MG: 500 TABLET, FILM COATED ORAL at 20:11

## 2022-12-13 RX ADMIN — LEVETIRACETAM 250 MG: 500 TABLET, FILM COATED ORAL at 08:32

## 2022-12-13 RX ADMIN — LOSARTAN POTASSIUM 25 MG: 25 TABLET, FILM COATED ORAL at 08:32

## 2022-12-13 RX ADMIN — DONEPEZIL HYDROCHLORIDE 10 MG: 5 TABLET, FILM COATED ORAL at 20:08

## 2022-12-13 RX ADMIN — ESCITALOPRAM OXALATE 5 MG: 10 TABLET ORAL at 08:32

## 2022-12-13 RX ADMIN — RISPERIDONE 0.25 MG: 0.5 TABLET, FILM COATED ORAL at 20:11

## 2022-12-13 RX ADMIN — RISPERIDONE 0.25 MG: 0.5 TABLET, FILM COATED ORAL at 08:32

## 2022-12-13 RX ADMIN — ENOXAPARIN SODIUM 40 MG: 100 INJECTION SUBCUTANEOUS at 18:28

## 2022-12-13 NOTE — PROGRESS NOTES
Hospitalist Progress Note   Admit Date:  12/10/2022  1:21 PM   Name:  Richard Gann   Age:  78 y.o. Sex:  female  :  1940   MRN:  [id number]   Room:  [facility]    Presenting Complaint: Fatigue     Reason(s) for Admission: Weakness [R53.1]  Altered mental status, unspecified altered mental status type [R41.82]     Hospital Course:   78-year-old female history of Alzheimer's, hypertension, seizure disorder, CKD stage III presented with functional decline over a few days. Patient attended adult . Daughter noticed patient having blank stares at time. Patient does this when she has partial seizure disorder. Patient follows with neurology outpatient. CT head negative. Influenza and COVID were negative. Subjective & 24hr Events (22): No acute complaints. Difficulty with EEG due to difficulty remaining still. Awaiting approval for rehab. Assessment & Plan:   Weakness  -PT, OT, PPD    Essential hypertension  -losartan    Seizure  -levetiracetam  -EEG  -asa    Late onset Alzheimer's disease with behavioral disturbance  -memantine, donepezil, risperidone    Chronic renal disease, stage III  -avoid nephrotoxic substances      Anticipated discharge needs:      Short term rehab    Diet:  ADULT DIET; Regular  DVT PPx: enoxaparin  Code status: DNR    Hospital Problems:  Principal Problem:    Weakness  Active Problems:    Do not resuscitate status    Essential hypertension    Late onset Alzheimer's disease with behavioral disturbance ([facility] [address]    Chronic renal disease, stage III ([facility] [address]  Resolved Problems:    * No resolved hospital problems.  *      Objective:   Patient Vitals for the past 24 hrs:   Temp Pulse Resp BP SpO2   22 1116 97.5 °F (36.4 °C) 69 16 123/71 --   22 0706 98.2 °F (36.8 °C) 74 16 (!) 143/77 98 %   22 0339 97.9 °F (36.6 °C) 76 -- (!) 124/91 97 %   22 2320 97.7 °F (36.5 °C) 65 18 139/77 98 %   22 1921 97.9 °F (36.6 °C) 73 18 (!) 136/59 100 % 12/12/22 1530 98.2 °F (36.8 °C) 64 17 110/62 98 %         Oxygen Therapy  SpO2: 98 %  O2 Device: None (Room air)    Estimated body mass index is 26.89 kg/m² as calculated from the following:    Height as of this encounter: 5' 2\" (1.575 m). Weight as of this encounter: 147 lb (66.7 kg). No intake or output data in the 24 hours ending 12/13/22 1347      Blood pressure 123/71, pulse 69, temperature 97.5 °F (36.4 °C), temperature source Oral, resp. rate 16, height 5' 2\" (1.575 m), weight 147 lb (66.7 kg), SpO2 98 %. Physical Exam  Vitals and nursing note reviewed. Constitutional:       General: She is not in acute distress. Appearance: She is ill-appearing. She is not diaphoretic. Eyes:      Extraocular Movements: Extraocular movements intact. Cardiovascular:      Rate and Rhythm: Normal rate. Pulmonary:      Effort: Pulmonary effort is normal. No respiratory distress. Abdominal:      General: There is no distension. Musculoskeletal:         General: No deformity. Skin:     Coloration: Skin is not jaundiced or pale. Neurological:      General: No focal deficit present. Mental Status: She is alert. Mental status is at baseline. She is disoriented. Psychiatric:         Mood and Affect: Mood normal.         Behavior: Behavior normal.         I have personally reviewed labs and tests showing:  Recent Labs:  No results found for this or any previous visit (from the past 48 hour(s)). I have personally reviewed imaging studies showing: Other Studies:  CT HEAD WO CONTRAST   Final Result   No CT evidence of acute intracranial abnormality. XR CHEST PORTABLE   Final Result   Mild chronic scarring at the lung bases.                          Current Meds:  Current Facility-Administered Medications   Medication Dose Route Frequency    sodium chloride flush 0.9 % injection 5-40 mL  5-40 mL IntraVENous 2 times per day    sodium chloride flush 0.9 % injection 5-40 mL  5-40 mL IntraVENous PRN    0.9 % sodium chloride infusion   IntraVENous PRN    enoxaparin (LOVENOX) injection 40 mg  40 mg SubCUTAneous Q24H    ondansetron (ZOFRAN-ODT) disintegrating tablet 4 mg  4 mg Oral Q8H PRN    Or    ondansetron (ZOFRAN) injection 4 mg  4 mg IntraVENous Q6H PRN    polyethylene glycol (GLYCOLAX) packet 17 g  17 g Oral Daily PRN    acetaminophen (TYLENOL) tablet 650 mg  650 mg Oral Q6H PRN    Or    acetaminophen (TYLENOL) suppository 650 mg  650 mg Rectal Q6H PRN    aspirin chewable tablet 81 mg  81 mg Oral Daily    donepezil (ARICEPT) tablet 10 mg  10 mg Oral Nightly    escitalopram (LEXAPRO) tablet 5 mg  5 mg Oral Daily    losartan (COZAAR) tablet 25 mg  25 mg Oral Daily    risperiDONE (RISPERDAL) tablet 0.25 mg  0.25 mg Oral BID    levETIRAcetam (KEPPRA) tablet 250 mg  250 mg Oral Daily    levETIRAcetam (KEPPRA) tablet 500 mg  500 mg Oral Nightly       Signed:  Uri Mccarthy MD

## 2022-12-14 VITALS
RESPIRATION RATE: 14 BRPM | BODY MASS INDEX: 27.05 KG/M2 | SYSTOLIC BLOOD PRESSURE: 135 MMHG | DIASTOLIC BLOOD PRESSURE: 73 MMHG | WEIGHT: 147 LBS | HEIGHT: 62 IN | TEMPERATURE: 98.1 F | OXYGEN SATURATION: 98 % | HEART RATE: 68 BPM

## 2022-12-14 LAB
ALBUMIN SERPL-MCNC: 3.5 G/DL (ref 3.2–4.6)
ALBUMIN/GLOB SERPL: 0.8 {RATIO} (ref 0.4–1.6)
ALP SERPL-CCNC: 122 U/L (ref 50–130)
ALT SERPL-CCNC: 22 U/L (ref 12–65)
ANION GAP SERPL CALC-SCNC: 2 MMOL/L (ref 2–11)
AST SERPL-CCNC: 20 U/L (ref 15–37)
BASOPHILS # BLD: 0 K/UL (ref 0–0.2)
BASOPHILS NFR BLD: 1 % (ref 0–2)
BILIRUB SERPL-MCNC: 0.4 MG/DL (ref 0.2–1.1)
BUN SERPL-MCNC: 14 MG/DL (ref 8–23)
CALCIUM SERPL-MCNC: 9.4 MG/DL (ref 8.3–10.4)
CHLORIDE SERPL-SCNC: 102 MMOL/L (ref 101–110)
CO2 SERPL-SCNC: 33 MMOL/L (ref 21–32)
CREAT SERPL-MCNC: 1.17 MG/DL (ref 0.6–1)
DIFFERENTIAL METHOD BLD: ABNORMAL
EOSINOPHIL # BLD: 0.2 K/UL (ref 0–0.8)
EOSINOPHIL NFR BLD: 5 % (ref 0.5–7.8)
ERYTHROCYTE [DISTWIDTH] IN BLOOD BY AUTOMATED COUNT: 13.1 % (ref 11.9–14.6)
GLOBULIN SER CALC-MCNC: 4.5 G/DL (ref 2.8–4.5)
GLUCOSE SERPL-MCNC: 92 MG/DL (ref 65–100)
HCT VFR BLD AUTO: 41.8 % (ref 35.8–46.3)
HGB BLD-MCNC: 13.1 G/DL (ref 11.7–15.4)
IMM GRANULOCYTES # BLD AUTO: 0 K/UL (ref 0–0.5)
IMM GRANULOCYTES NFR BLD AUTO: 0 % (ref 0–5)
LYMPHOCYTES # BLD: 2.1 K/UL (ref 0.5–4.6)
LYMPHOCYTES NFR BLD: 48 % (ref 13–44)
MCH RBC QN AUTO: 30.6 PG (ref 26.1–32.9)
MCHC RBC AUTO-ENTMCNC: 31.3 G/DL (ref 31.4–35)
MCV RBC AUTO: 97.7 FL (ref 82–102)
MONOCYTES # BLD: 0.4 K/UL (ref 0.1–1.3)
MONOCYTES NFR BLD: 10 % (ref 4–12)
NEUTS SEG # BLD: 1.5 K/UL (ref 1.7–8.2)
NEUTS SEG NFR BLD: 35 % (ref 43–78)
NRBC # BLD: 0 K/UL (ref 0–0.2)
PLATELET # BLD AUTO: 267 K/UL (ref 150–450)
PMV BLD AUTO: 8.9 FL (ref 9.4–12.3)
POTASSIUM SERPL-SCNC: 4 MMOL/L (ref 3.5–5.1)
PROT SERPL-MCNC: 8 G/DL (ref 6.3–8.2)
RBC # BLD AUTO: 4.28 M/UL (ref 4.05–5.2)
SARS-COV-2 RDRP RESP QL NAA+PROBE: NOT DETECTED
SODIUM SERPL-SCNC: 137 MMOL/L (ref 133–143)
SOURCE: NORMAL
WBC # BLD AUTO: 4.3 K/UL (ref 4.3–11.1)

## 2022-12-14 PROCEDURE — 36415 COLL VENOUS BLD VENIPUNCTURE: CPT

## 2022-12-14 PROCEDURE — 1100000000 HC RM PRIVATE

## 2022-12-14 PROCEDURE — 95816 EEG AWAKE AND DROWSY: CPT | Performed by: PSYCHIATRY & NEUROLOGY

## 2022-12-14 PROCEDURE — 2580000003 HC RX 258: Performed by: NURSE PRACTITIONER

## 2022-12-14 PROCEDURE — 6370000000 HC RX 637 (ALT 250 FOR IP): Performed by: NURSE PRACTITIONER

## 2022-12-14 PROCEDURE — 85025 COMPLETE CBC W/AUTO DIFF WBC: CPT

## 2022-12-14 PROCEDURE — G0378 HOSPITAL OBSERVATION PER HR: HCPCS

## 2022-12-14 PROCEDURE — 80053 COMPREHEN METABOLIC PANEL: CPT

## 2022-12-14 PROCEDURE — 95816 EEG AWAKE AND DROWSY: CPT

## 2022-12-14 PROCEDURE — 87635 SARS-COV-2 COVID-19 AMP PRB: CPT

## 2022-12-14 RX ORDER — LEVETIRACETAM 500 MG/1
500 TABLET ORAL
Qty: 30 TABLET | Refills: 0 | Status: SHIPPED | OUTPATIENT
Start: 2022-12-14

## 2022-12-14 RX ADMIN — LOSARTAN POTASSIUM 25 MG: 25 TABLET, FILM COATED ORAL at 08:47

## 2022-12-14 RX ADMIN — LEVETIRACETAM 250 MG: 500 TABLET, FILM COATED ORAL at 08:47

## 2022-12-14 RX ADMIN — RISPERIDONE 0.25 MG: 0.5 TABLET, FILM COATED ORAL at 08:47

## 2022-12-14 RX ADMIN — SODIUM CHLORIDE, PRESERVATIVE FREE 10 ML: 5 INJECTION INTRAVENOUS at 08:51

## 2022-12-14 RX ADMIN — ESCITALOPRAM OXALATE 5 MG: 10 TABLET ORAL at 08:46

## 2022-12-14 RX ADMIN — ASPIRIN 81 MG: 81 TABLET, CHEWABLE ORAL at 08:46

## 2022-12-14 NOTE — DISCHARGE SUMMARY
Hospitalist Discharge Summary   Admit Date:  12/10/2022  1:21 PM   DC Note date: 2022  Name:  Moe Montenegro   Age:  78 y.o. Sex:  female  :  1940   MRN:  [id number]   Room:  [facility]  PCP:  Damari Hamilton MD    Presenting Complaint: Fatigue     Initial Admission Diagnosis: Weakness [R53.1]  Altered mental status, unspecified altered mental status type [R41.82]     Problem List for this Hospitalization (present on admission):    Principal Problem:    Weakness  Active Problems:    Do not resuscitate status    Essential hypertension    Late onset Alzheimer's disease with behavioral disturbance ([facility]    Chronic renal disease, stage III ([facility]  Resolved Problems:    * No resolved hospital problems. [facility] Course:  40-year-old female history of Alzheimer's, hypertension, seizure disorder, CKD stage III presented with functional decline over a few days. Patient attended adult . Daughter noticed patient having blank stares at time. Patient does this when she has partial seizure disorder. Patient follows with neurology outpatient. CT head negative. Influenza and COVID were negative. She was admitted. EEG was performed. She was evaluated by PT, OT. Her medication doses were adjusted. She was determined to be appropriate for discharge . Disposition: short term rehab  Diet: ADULT DIET; Regular  Code Status: DNR    Follow Ups:   Contact information for follow-up providers     Damari Hamilton MD. Schedule an appointment as soon as possible for a   visit in 1 week(s). Specialty: Family Medicine  Why: Transition of Care Management  Contact information:  [address]  [phone]                   Contact information for after-discharge care     Discharge [address] [facility]) .     Service: Skilled Nursing  Contact information:  [address]  [place] [address] [phone] Time spent in patient discharge and coordination 33 minutes. Follow up labs/diagnostics (ultimately defer to outpatient provider):  Medication changes noted below  Progress of rehabilitation  Consider referral to nephrology for chronic disease    Plan was discussed with patient, , nurse. All questions answered. Patient was stable at time of discharge. Instructions given to call a physician or return if any concerns. Current Discharge Medication List        START taking these medications    Details   !! levETIRAcetam (KEPPRA) 500 MG tablet Take 1 tablet by mouth nightly  Qty: 30 tablet, Refills: 0       !! - Potential duplicate medications found. Please discuss with provider. CONTINUE these medications which have NOT CHANGED    Details   !! levETIRAcetam (KEPPRA) 250 MG tablet 1 QAM, 2 QHS. Qty: 270 tablet, Refills: 1      risperiDONE (RISPERDAL) 0.25 MG tablet Take 1 tablet by mouth 2 times daily  Qty: 60 tablet, Refills: 3    Associated Diagnoses: Late onset Alzheimer's disease with behavioral disturbance (HCC)      donepezil (ARICEPT) 10 MG tablet Take 10 mg by mouth      losartan (COZAAR) 25 MG tablet Take 25 mg by mouth daily       ! ! - Potential duplicate medications found. Please discuss with provider. STOP taking these medications       aspirin (ASPIRIN CHILDRENS) 81 MG chewable tablet Comments:   Reason for Stopping:         escitalopram (LEXAPRO) 10 MG tablet Comments:   Reason for Stopping:         memantine (NAMENDA) 10 MG tablet Comments:   Reason for Stopping:               Procedures done this admission:  * No surgery found *    Consults this admission:  None    Echocardiogram results:  No results found for this or any previous visit. Diagnostic Imaging/Tests:   CT HEAD WO CONTRAST    Result Date: 12/10/2022  No CT evidence of acute intracranial abnormality. XR CHEST PORTABLE    Result Date: 12/10/2022  Mild chronic scarring at the lung bases. Labs: Results:       BMP, Mg, Phos Recent Labs     12/14/22  0738      K 4.0      CO2 33*   ANIONGAP 2   BUN 14   CREATININE 1.17*   LABGLOM 46*   CALCIUM 9.4   GLUCOSE 92      CBC Recent Labs     12/14/22  0738   WBC 4.3   RBC 4.28   HGB 13.1   HCT 41.8   MCV 97.7   MCH 30.6   MCHC 31.3*   RDW 13.1      MPV 8.9*   NRBC 0.00   SEGS 35*   LYMPHOPCT 48*   EOSRELPCT 5   MONOPCT 10   BASOPCT 1   IMMGRAN 0   SEGSABS 1.5*   LYMPHSABS 2.1   EOSABS 0.2   MONOSABS 0.4   BASOSABS 0.0   ABSIMMGRAN 0.0      LFT Recent Labs     12/14/22  0738   BILITOT 0.4   ALKPHOS 122   AST 20   ALT 22   PROT 8.0   LABALBU 3.5   GLOB 4.5      Cardiac  Lab Results   Component Value Date/Time    TROPHS 15.5 05/03/2022 02:03 AM    TROPHS 15.0 05/03/2022 12:49 AM    TROPHS 11.5 12/26/2021 08:15 PM      Coags Lab Results   Component Value Date/Time    PROTIME 13.6 05/03/2022 12:49 AM    PROTIME 11.3 05/03/2022 12:40 AM    PROTIME 13.6 12/26/2021 08:20 PM    PROTIME 14.3 12/26/2021 08:15 PM    INR 1.0 05/03/2022 12:49 AM    INR 0.9 05/03/2022 12:40 AM    INR 1.1 12/26/2021 08:20 PM    INR 1.1 12/26/2021 08:15 PM      A1c Lab Results   Component Value Date/Time    LABA1C 5.6 05/03/2022 02:03 AM    LABA1C 5.5 12/27/2021 03:27 AM     05/03/2022 02:03 AM     12/27/2021 03:27 AM      Lipids Lab Results   Component Value Date/Time    CHOL 242 05/03/2022 02:03 AM    LDLCALC 128 05/03/2022 02:03 AM    LABVLDL 15 05/03/2022 02:03 AM    HDL 99 05/03/2022 02:03 AM    CHOLHDLRATIO 2.4 05/03/2022 02:03 AM    TRIG 75 05/03/2022 02:03 AM      Thyroid  No results found for: Veena Short     Most Recent UA Lab Results   Component Value Date/Time    COLORU YELLOW/STRAW 12/10/2022 04:30 PM    APPEARANCE CLOUDY 12/10/2022 04:30 PM    SPECGRAV 1.014 12/10/2022 04:30 PM    LABPH 8.5 12/10/2022 04:30 PM    PROTEINU Negative 12/10/2022 04:30 PM    GLUCOSEU Negative 12/10/2022 04:30 PM    KETUA 15 12/10/2022 04:30 PM    BILIRUBINUR Negative 12/10/2022 04:30 PM    BILIRUBINUR Negative 05/03/2022 12:46 AM    BLOODU Negative 12/10/2022 04:30 PM    UROBILINOGEN 1.0 12/10/2022 04:30 PM    NITRU Negative 12/10/2022 04:30 PM    LEUKOCYTESUR Negative 12/10/2022 04:30 PM    WBCUA 0-3 05/03/2022 12:46 AM    RBCUA 0 05/03/2022 12:46 AM    BACTERIA 0 05/03/2022 12:46 AM    MUCUS Present 10/02/2020 10:08 AM        No results for input(s): CULTURE in the last 720 hours.     All Labs from Last 24 Hrs:  Recent Results (from the past 24 hour(s))   Comprehensive Metabolic Panel w/ Reflex to MG    Collection Time: 12/14/22  7:38 AM   Result Value Ref Range    Sodium 137 133 - 143 mmol/L    Potassium 4.0 3.5 - 5.1 mmol/L    Chloride 102 101 - 110 mmol/L    CO2 33 (H) 21 - 32 mmol/L    Anion Gap 2 2 - 11 mmol/L    Glucose 92 65 - 100 mg/dL    BUN 14 8 - 23 MG/DL    Creatinine 1.17 (H) 0.6 - 1.0 MG/DL    Est, Glom Filt Rate 46 (L) >60 ml/min/1.73m2    Calcium 9.4 8.3 - 10.4 MG/DL    Total Bilirubin 0.4 0.2 - 1.1 MG/DL    ALT 22 12 - 65 U/L    AST 20 15 - 37 U/L    Alk Phosphatase 122 50 - 130 U/L    Total Protein 8.0 6.3 - 8.2 g/dL    Albumin 3.5 3.2 - 4.6 g/dL    Globulin 4.5 2.8 - 4.5 g/dL    Albumin/Globulin Ratio 0.8 0.4 - 1.6     CBC with Auto Differential    Collection Time: 12/14/22  7:38 AM   Result Value Ref Range    WBC 4.3 4.3 - 11.1 K/uL    RBC 4.28 4.05 - 5.2 M/uL    Hemoglobin 13.1 11.7 - 15.4 g/dL    Hematocrit 41.8 35.8 - 46.3 %    MCV 97.7 82.0 - 102.0 FL    MCH 30.6 26.1 - 32.9 PG    MCHC 31.3 (L) 31.4 - 35.0 g/dL    RDW 13.1 11.9 - 14.6 %    Platelets 907 107 - 440 K/uL    MPV 8.9 (L) 9.4 - 12.3 FL    nRBC 0.00 0.0 - 0.2 K/uL    Differential Type AUTOMATED      Seg Neutrophils 35 (L) 43 - 78 %    Lymphocytes 48 (H) 13 - 44 %    Monocytes 10 4.0 - 12.0 %    Eosinophils % 5 0.5 - 7.8 %    Basophils 1 0.0 - 2.0 %    Immature Granulocytes 0 0.0 - 5.0 %    Segs Absolute 1.5 (L) 1.7 - 8.2 K/UL    Absolute Lymph # 2.1 0.5 - 4.6 K/UL    Absolute Mono # 0.4 0.1 - 1.3 K/UL    Absolute Eos # 0.2 0.0 - 0.8 K/UL    Basophils Absolute 0.0 0.0 - 0.2 K/UL    Absolute Immature Granulocyte 0.0 0.0 - 0.5 K/UL   COVID-19, Rapid    Collection Time: 12/14/22  9:44 AM    Specimen: Nasopharyngeal   Result Value Ref Range    Source Nasopharyngeal      SARS-CoV-2, Rapid Not detected NOTD         Allergies   Allergen Reactions    Lisinopril Other (See Comments)     Immunization History   Administered Date(s) Administered    COVID-19, MODERNA BLUE border, Primary or Immunocompromised, (age 12y+), IM, 100 mcg/0.5mL 03/19/2021, 04/16/2021    Influenza Virus Vaccine 01/01/2015    Influenza, FLUAD, (age 72 y+), Adjuvanted, 0.5mL 10/02/2020, 09/28/2021    Influenza, High Dose (Fluzone 65 yrs and older) 10/09/2017, 10/12/2018, 01/15/2019, 11/06/2019    PPD Test 12/11/2022    Pneumococcal Conjugate 13-valent ([id number]) 11/06/2015, 11/06/2019    Pneumococcal Polysaccharide ([id number]) 01/01/2006       Recent Vital Data:  Patient Vitals for the past 24 hrs:   Temp Pulse Resp BP SpO2   12/14/22 0710 96.8 °F (36 °C) 76 16 (!) 157/85 100 %   12/14/22 0315 98.1 °F (36.7 °C) 67 -- 139/75 100 %   12/13/22 2311 98.1 °F (36.7 °C) 73 16 125/75 97 %   12/13/22 1507 97.7 °F (36.5 °C) 75 16 135/68 100 %   12/13/22 1116 97.5 °F (36.4 °C) 69 16 123/71 --       Oxygen Therapy  SpO2: 100 %  O2 Device: None (Room air)    Estimated body mass index is 26.89 kg/m² as calculated from the following:    Height as of this encounter: 5' 2\" (1.575 m). Weight as of this encounter: 147 lb (66.7 kg). Intake/Output Summary (Last 24 hours) at 12/14/2022 1024  Last data filed at 12/14/2022 0837  Gross per 24 hour   Intake 240 ml   Output --   Net 240 ml       Physical Exam  Vitals and nursing note reviewed. Constitutional:       General: She is not in acute distress. Appearance: She is not ill-appearing or diaphoretic. Eyes:      Extraocular Movements: Extraocular movements intact.    Cardiovascular: Rate and Rhythm: Normal rate. Pulmonary:      Effort: Pulmonary effort is normal. No respiratory distress. Abdominal:      General: There is no distension. Musculoskeletal:         General: No deformity. Skin:     Coloration: Skin is not jaundiced or pale. Neurological:      General: No focal deficit present. Mental Status: She is alert. Mental status is at baseline. She is disoriented.    Psychiatric:         Mood and Affect: Mood normal.         Behavior: Behavior normal.      Comments: pleasant         Signed:  Emery Betts MD

## 2022-12-14 NOTE — CARE COORDINATION
Patient care plan reviewed in interdisciplinary rounds with the following disciplines: MD, nursing, case management, therapy, and nutrition services. Pt is medically stable and ready for discharge today. Bed availability confirmed at [facility] (room [address]W, report # [phone]). CM spoke with patient's daughter, Glendora Harada, to provide an update. She will arrive at 1PM to transport her mother to [facility]. MD and nursing staff updated on plans. No additional discharge planning needs noted. 12/11/22 1009   Service Assessment   Patient Orientation Alert and Oriented   Cognition Alert   History Provided By Child/Family   Accompanied By/Relationship daughter   Support Systems Family Members   Patient's Fei Brown is: Named in [facility]   PCP Verified by CM Yes   Prior Functional Level Dressing   Current Functional Level Dressing   Can patient return to prior living arrangement Unknown at present   Ability to make needs known: Fair   Family able to assist with home care needs: Yes   Would you like for me to discuss the discharge plan with any other family members/significant others, and if so, who? No   Financial Resources [employer] Other (Comment)   Discharge Planning   [facility]   Patient expects to be discharged to: [facility]   [facility]   Condition of Participation: Discharge Planning   The Plan for Transition of Care is related to the following treatment goals: Increase strength   The Patient and/or Patient Representative was provided with a Choice of Provider?  Patient

## 2022-12-14 NOTE — PROCEDURES
EEG REPORT       Patient: Doretha Connolly Age: 78 y.o. MRN: [id number]    Date: 12/10/2022  Referring Provider: No ref. provider found    History: This routine 30 minute scalp EEG was recorded with video- monitoring for a 78 y.o. Leonela Mena female  who presented with encephalopathy. This EEG was performed to evaluate for focal and epileptiform abnormalities.      Doretha Connolly   Current Facility-Administered Medications   Medication Dose Route Frequency Provider Last Rate Last Admin    sodium chloride flush 0.9 % injection 5-40 mL  5-40 mL IntraVENous 2 times per day Zehra Duane, APRN - CNP   10 mL at 12/14/22 0851    sodium chloride flush 0.9 % injection 5-40 mL  5-40 mL IntraVENous PRN Zehra Duane, APRN - CNP        0.9 % sodium chloride infusion   IntraVENous PRN Zehra Duane, APRN - CNP        enoxaparin (LOVENOX) injection 40 mg  40 mg SubCUTAneous Q24H Zehra Duane, APRN - CNP   40 mg at 12/13/22 1828    ondansetron (ZOFRAN-ODT) disintegrating tablet 4 mg  4 mg Oral Q8H PRN Zehra Duane, APRN - CNP        Or    ondansetron [facility]) injection 4 mg  4 mg IntraVENous Q6H PRN Zehra Duane, APRN - CNP        polyethylene glycol (GLYCOLAX) packet 17 g  17 g Oral Daily PRN Zehra Duane, APRN - CNP        acetaminophen (TYLENOL) tablet 650 mg  650 mg Oral Q6H PRN Zehra Duane, APRN - CNP   650 mg at 12/10/22 2017    Or    acetaminophen (TYLENOL) suppository 650 mg  650 mg Rectal Q6H PRN Zehra Duane, APRN - CNP        aspirin chewable tablet 81 mg  81 mg Oral Daily Zehra Duane, APRN - CNP   81 mg at 12/14/22 0846    donepezil (ARICEPT) tablet 10 mg  10 mg Oral Nightly Zehra Duane, APRN - CNP   10 mg at 12/13/22 2008    escitalopram (LEXAPRO) tablet 5 mg  5 mg Oral Daily Zehra Duane, APRN - CNP   5 mg at 12/14/22 0846    losartan (COZAAR) tablet 25 mg  25 mg Oral Daily Zehra Duane, APRN - CNP   25 mg at 12/14/22 0847    risperiDONE (RISPERDAL) tablet 0.25 mg  0.25 mg Oral BID Dayna Duane, APRN - CNP   0.25 mg at 12/14/22 0847    levETIRAcetam (KEPPRA) tablet 250 mg  250 mg Oral Daily PRECIOUS Lin CNP   250 mg at 12/14/22 0847    levETIRAcetam (KEPPRA) tablet 500 mg  500 mg Oral Nightly PRECIOUS Lin CNP   500 mg at 12/13/22 2011        Technical Description: This is a 21 channel digital EEG recording with time-locked video. Electrodes were placed in accordance with the 10-20 International System of Electrode Placement. Single lead EKG monitoring as well as temporal electrodes were included. The patient was not sleep deprived. This recording was obtained during wakefulness. And drowsiness  EEG Description: The dominant background activity during maximal recorded wakefulness consisted of bioccipitally dominant 9-10 Hz, 25-35 uV symmetric, regular activity that was reactive to eye opening. During drowsiness, the background rhythm waxed and waned and there were periods of slowing. There is occasional irregular sharp activity favored to be artifact in the right temporal region during stage II sleep symmetric V waves, K complexes, and sleep spindles were seen. There was appropriate diffuse delta activity during slow wave sleep. Photic stimulation - stepwise photic stimulation at 2-30 Hz was performed and there was a biposterior, symmetric, driving response. Hyperventilation - was not preformed. No abnormalities were activated by photic stimulation     The EKG channel demonstrated a normal sinus rhythm. Interpretation  This EEG was normal in wakefulness and sleep. There is some artifact identified of a sharp nature in the right temporal region which has no effect upon the background and electrocortical rhythm seen in the occipital scalp areas and this is felt to be artifact    Clinical correlation  This EEG was normal. No focal or epileptiform abnormalities were seen.     Marilia Castaneda MD  Diplomate, American Board of Psychiatry and Neurology

## 2022-12-15 ENCOUNTER — CARE COORDINATION (OUTPATIENT)
Dept: CARE COORDINATION | Facility: CLINIC | Age: 82
End: 2022-12-15

## 2022-12-15 NOTE — CARE COORDINATION
Transition of care outreach postponed for 14 days due to patient's discharge to SNF [facility].

## 2022-12-28 RX ORDER — DONEPEZIL HYDROCHLORIDE 10 MG/1
10 TABLET, FILM COATED ORAL NIGHTLY
Qty: 90 TABLET | Refills: 3 | Status: SHIPPED | OUTPATIENT
Start: 2022-12-28

## 2022-12-29 ENCOUNTER — CARE COORDINATION (OUTPATIENT)
Dept: CARE COORDINATION | Facility: CLINIC | Age: 82
End: 2022-12-29

## 2022-12-29 NOTE — CARE COORDINATION
[facility] Update Call    2022    Patient: Mike Cedeno Patient : 1940   MRN: [id number]  Reason for Admission: [facility]  Discharge Date: 22 RARS: Readmission Risk Score: 8.9         Care Transitions Post Acute Facility Update    Care Transitions Interventions  Post Acute Facility Update  Patient remains at [facility] with no discharge date at time of call.  CTN will continue to follow up regarding progress/discharge date

## 2023-01-03 ENCOUNTER — CARE COORDINATION (OUTPATIENT)
Dept: CARE COORDINATION | Facility: CLINIC | Age: 83
End: 2023-01-03

## 2023-01-03 NOTE — CARE COORDINATION
[facility] Update Call    1/3/2023    Patient: Patti Cummins Patient : 1940   MRN: [id number]  Reason for Admission: [facility]  Discharge Date: 22 RARS: Readmission Risk Score: 8.9         Care Transitions Post Acute Facility Update    Care Transitions Interventions  Post Acute Facility Update  CTN contacted Angie. Message left with Radha Crawford requesting a return call regarding patient discharge plan/date.

## 2023-01-04 ENCOUNTER — CARE COORDINATION (OUTPATIENT)
Dept: CARE COORDINATION | Facility: CLINIC | Age: 83
End: 2023-01-04

## 2023-01-04 NOTE — CARE COORDINATION
[facility] Care Transitions Initial Follow Up Call    Call within 2 business days of discharge: Yes    Care Transition Nurse contacted the family by telephone to perform post hospital discharge assessment. Verified name and  with family as identifiers. Provided introduction to self, and explanation of the Care Transition Nurse role. Patient: Izabella Sal Patient : 1940   MRN: [id number]  Reason for Admission: [facility]  Discharge Date: 22 RARS: Readmission Risk Score: 8.9      Last Discharge [address]       Date Complaint Diagnosis Description Type Department Provider    12/10/22 Fatigue Altered mental status, unspecified altered mental status type ED to Hosp-Admission (Discharged) (ADMITTED) Charlotte Buchanan MD; Bonilla Perla. .. Was this an external facility discharge? Yes, 2022  Discharge Facility: [address] [place] [address] to be reviewed by the provider   Additional needs identified to be addressed with provider: No  none               Method of communication with provider: none. Care Transition Nurse reviewed discharge instructions with family who verbalized understanding. The family was given an opportunity to ask questions and does not have any further questions or concerns at this time. Were discharge instructions available to patient? Yes. Reviewed appropriate site of care based on symptoms and resources available to patient including: PCP. The family agrees to contact the PCP office for questions related to their healthcare. Advance Care Planning:   Does patient have an Advance Directive: decision maker updated. Medication reconciliation was performed with family, who verbalizes understanding of administration of home medications. Medications reviewed, 1111F entered: N/A    Was patient discharged with a pulse oximeter? no    Non-face-to-face services provided:  Daughter reports patient doing well since STR discharge with no issues.  CTN offered assistance with PCP appointment. Daughter prefers to contact PCP for appointment. Nadir MENDOZA at [facility] offered but declined due to patient attending Adult Day Services. Offered patient enrollment in the Remote Patient Monitoring (RPM) program for in-home monitoring: NA.    Care Transitions 24 Hour Call    Schedule Follow Up Appointment with PCP: Declined  Do you have any ongoing symptoms?: No  Do you have all of your prescriptions and are they filled?: Yes  Have you scheduled your follow up appointment?: No (Comment: Daughter prefers to schedule)  Were you discharged with any Home Care or Post Acute Services: No  Do you feel like you have everything you need to keep you well at home?: Yes  Care Transitions Interventions    Physical Therapy: Declined     Occupational Therapy: Declined              Follow Up  No future appointments. Care Transition Nurse provided contact information. No further follow-up call indicated based on daughter to contact CTN with any issues or assistance when needed.     Albina Cole RN